# Patient Record
Sex: FEMALE | Race: WHITE | Employment: UNEMPLOYED | URBAN - METROPOLITAN AREA
[De-identification: names, ages, dates, MRNs, and addresses within clinical notes are randomized per-mention and may not be internally consistent; named-entity substitution may affect disease eponyms.]

---

## 2017-01-01 NOTE — LETTER
VACCINE ADMINISTRATION RECORD  PARENT / GUARDIAN APPROVAL  Date: 2022  Vaccine administered to: Hilary Foster     : 3/10/1995    MRN: [id number]    A copy of the appropriate Centers for Disease Control and Prevention Vaccine Information statement has been provided. I have read or have had explained the information about the diseases and the vaccines listed below. There was an opportunity to ask questions and any questions were answered satisfactorily. I believe that I understand the benefits and risks of the vaccine cited and ask that the vaccine(s) listed below be given to me or to the person named above (for whom I am authorized to make this request). VACCINES ADMINISTERED:  Tdap    I have read and hereby agree to be bound by the terms of this agreement as stated above. My signature is valid until revoked by me in writing. This document is signed by Seble Sprague relationship: Self on 2022.                                                                                                                                          Parent / Gerhardt Gill Signature                                                Date DISPLAY PLAN FREE TEXT

## 2017-02-04 ENCOUNTER — TELEPHONE (OUTPATIENT)
Dept: INTERNAL MEDICINE CLINIC | Facility: CLINIC | Age: 22
End: 2017-02-04

## 2017-02-04 RX ORDER — LEVOTHYROXINE SODIUM 0.03 MG/1
TABLET ORAL
Qty: 90 TABLET | Refills: 0 | Status: SHIPPED | OUTPATIENT
Start: 2017-02-04 | End: 2017-02-04

## 2017-02-04 RX ORDER — LEVOTHYROXINE SODIUM 0.03 MG/1
TABLET ORAL
Qty: 30 TABLET | Refills: 0 | Status: SHIPPED | OUTPATIENT
Start: 2017-02-04 | End: 2017-03-07

## 2017-02-04 NOTE — TELEPHONE ENCOUNTER
Patient requesting RX to be sent to new pharmacy [facility] mail order pharmacy [facility]  Patient states she is running out of medication and believes she does not have enough left before it gets to her by mail   Please submit as soon as possible   Thank

## 2017-02-04 NOTE — TELEPHONE ENCOUNTER
30 day sent to the local pharmacy as requested. Levothyroxine original order was on 2/4/17; Patient called and informed with understanding.

## 2017-02-04 NOTE — TELEPHONE ENCOUNTER
Patient requesting a call back from nurse regarding her medication   Patient will not have enough medication left before she gets it thru the mail order pharmacy   Patient wants to know if it will be okay if she does not take it for a couple of days or if

## 2017-02-04 NOTE — TELEPHONE ENCOUNTER
Refilled per protocol    Hypothyroid Medications  Protocol Criteria:  Appointment scheduled in the past 12 months or the next 3 months  TSH resulted in the past 12 months that is normal  Recent Visits       Provider Department Primary Dx    8 months ago Pa

## 2017-03-08 RX ORDER — LEVOTHYROXINE SODIUM 0.03 MG/1
TABLET ORAL
Qty: 30 TABLET | Refills: 0 | Status: SHIPPED | OUTPATIENT
Start: 2017-03-08 | End: 2017-04-03

## 2017-04-04 RX ORDER — LEVOTHYROXINE SODIUM 0.03 MG/1
TABLET ORAL
Qty: 30 TABLET | Refills: 0 | Status: SHIPPED | OUTPATIENT
Start: 2017-04-04 | End: 2017-05-07

## 2017-05-08 RX ORDER — LEVOTHYROXINE SODIUM 0.03 MG/1
TABLET ORAL
Qty: 30 TABLET | Refills: 0 | Status: SHIPPED | OUTPATIENT
Start: 2017-05-08 | End: 2017-05-09

## 2017-05-09 ENCOUNTER — OFFICE VISIT (OUTPATIENT)
Dept: INTERNAL MEDICINE CLINIC | Facility: CLINIC | Age: 22
End: 2017-05-09

## 2017-05-09 VITALS
RESPIRATION RATE: 16 BRPM | DIASTOLIC BLOOD PRESSURE: 73 MMHG | SYSTOLIC BLOOD PRESSURE: 115 MMHG | TEMPERATURE: 98 F | WEIGHT: 133 LBS | HEART RATE: 69 BPM | BODY MASS INDEX: 21 KG/M2

## 2017-05-09 DIAGNOSIS — E03.9 HYPOTHYROIDISM, UNSPECIFIED TYPE: Primary | ICD-10-CM

## 2017-05-09 DIAGNOSIS — Z30.011 ORAL CONTRACEPTION INITIAL PRESCRIPTION: ICD-10-CM

## 2017-05-09 DIAGNOSIS — M67.471 DIGITAL MUCOUS CYST OF TOE OF RIGHT FOOT: ICD-10-CM

## 2017-05-09 PROCEDURE — 99212 OFFICE O/P EST SF 10 MIN: CPT | Performed by: INTERNAL MEDICINE

## 2017-05-09 PROCEDURE — 99214 OFFICE O/P EST MOD 30 MIN: CPT | Performed by: INTERNAL MEDICINE

## 2017-05-09 RX ORDER — NORETHINDRONE ACETATE AND ETHINYL ESTRADIOL 1; .02 MG/1; MG/1
1 TABLET ORAL DAILY
Qty: 1 PACKAGE | Refills: 3 | Status: SHIPPED | OUTPATIENT
Start: 2017-05-09 | End: 2017-08-09

## 2017-05-09 RX ORDER — LEVOTHYROXINE SODIUM 0.03 MG/1
25 TABLET ORAL DAILY
Qty: 90 TABLET | Refills: 0 | Status: SHIPPED | OUTPATIENT
Start: 2017-05-09 | End: 2017-09-04

## 2017-05-09 NOTE — PROGRESS NOTES
Patient ID: Yessenia Joseph is a 25year old female.   Patient presents with:  CPX: presenting for cpx and to begin birth control       HPI  Here for physical and refills   Just finished school on sat 3 days ago  Will get  in June so wants ocps developed, well nourished,in no apparent distress  SKIN: no rashes,no suspicious lesions  HEENT: atraumatic, normocephalic,ears and throat are clear,   NECK: supple,no adenopathy,  LUNGS: clear to auscultation, no wheeze  CARDIO: RRR without murmur  GI: go

## 2017-05-11 ENCOUNTER — LAB ENCOUNTER (OUTPATIENT)
Dept: LAB | Age: 22
End: 2017-05-11
Attending: INTERNAL MEDICINE
Payer: COMMERCIAL

## 2017-05-11 DIAGNOSIS — E03.9 HYPOTHYROIDISM, UNSPECIFIED TYPE: ICD-10-CM

## 2017-05-11 PROCEDURE — 84443 ASSAY THYROID STIM HORMONE: CPT

## 2017-05-11 PROCEDURE — 80053 COMPREHEN METABOLIC PANEL: CPT

## 2017-05-11 PROCEDURE — 83036 HEMOGLOBIN GLYCOSYLATED A1C: CPT

## 2017-05-11 PROCEDURE — 85025 COMPLETE CBC W/AUTO DIFF WBC: CPT

## 2017-05-11 PROCEDURE — 80061 LIPID PANEL: CPT

## 2017-05-11 PROCEDURE — 36415 COLL VENOUS BLD VENIPUNCTURE: CPT

## 2017-05-15 ENCOUNTER — TELEPHONE (OUTPATIENT)
Dept: INTERNAL MEDICINE CLINIC | Facility: CLINIC | Age: 22
End: 2017-05-15

## 2017-05-16 NOTE — TELEPHONE ENCOUNTER
MMP see message below. JUNITO Quintana advised to book appt for pt with soonest provider available to discuss results and possible further work up. Routed to MMP as FYI.

## 2017-05-16 NOTE — TELEPHONE ENCOUNTER
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

## 2017-05-17 ENCOUNTER — OFFICE VISIT (OUTPATIENT)
Dept: INTERNAL MEDICINE CLINIC | Facility: CLINIC | Age: 22
End: 2017-05-17

## 2017-05-17 ENCOUNTER — HOSPITAL ENCOUNTER (OUTPATIENT)
Dept: GENERAL RADIOLOGY | Facility: HOSPITAL | Age: 22
Discharge: HOME | End: 2017-05-17
Attending: INTERNAL MEDICINE
Payer: COMMERCIAL

## 2017-05-17 VITALS
SYSTOLIC BLOOD PRESSURE: 112 MMHG | TEMPERATURE: 98 F | RESPIRATION RATE: 16 BRPM | BODY MASS INDEX: 21 KG/M2 | HEART RATE: 71 BPM | WEIGHT: 131 LBS | DIASTOLIC BLOOD PRESSURE: 74 MMHG

## 2017-05-17 DIAGNOSIS — R76.11 POSITIVE PPD: Primary | ICD-10-CM

## 2017-05-17 DIAGNOSIS — R76.11 POSITIVE PPD: ICD-10-CM

## 2017-05-17 PROCEDURE — 71010 XR CHEST AP/PA (1 VIEW) (CPT=71010): CPT | Performed by: INTERNAL MEDICINE

## 2017-05-17 PROCEDURE — 99213 OFFICE O/P EST LOW 20 MIN: CPT | Performed by: INTERNAL MEDICINE

## 2017-05-17 PROCEDURE — 99212 OFFICE O/P EST SF 10 MIN: CPT | Performed by: INTERNAL MEDICINE

## 2017-05-17 NOTE — PROGRESS NOTES
Patient ID: Seema Mtz is a 25year old female.   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

## 2017-05-18 ENCOUNTER — TELEPHONE (OUTPATIENT)
Dept: FAMILY MEDICINE CLINIC | Facility: CLINIC | Age: 22
End: 2017-05-18

## 2017-05-18 NOTE — TELEPHONE ENCOUNTER
Patient left forms at [facility] office for doctor to complete. Please call patient when complete.  [phone]

## 2017-05-22 DIAGNOSIS — R76.11 POSITIVE PPD: Primary | ICD-10-CM

## 2017-05-23 NOTE — TELEPHONE ENCOUNTER
Noted pt states she had 2 tuberculin skin test done at the [facility] first 1 was normal but she had lost the paper and she had to go back for a second one and that is when the nurse that the Opal that said that it was 15 mm positive had spoken to

## 2017-05-23 NOTE — TELEPHONE ENCOUNTER
Will scan all forms -- will give copy to pt as well including the cxr -- Hima) is helping with all of this

## 2017-05-24 ENCOUNTER — APPOINTMENT (OUTPATIENT)
Dept: LAB | Age: 22
End: 2017-05-24
Attending: INTERNAL MEDICINE
Payer: COMMERCIAL

## 2017-05-24 DIAGNOSIS — R76.11 POSITIVE PPD: ICD-10-CM

## 2017-05-24 PROCEDURE — 86480 TB TEST CELL IMMUN MEASURE: CPT

## 2017-05-24 PROCEDURE — 36415 COLL VENOUS BLD VENIPUNCTURE: CPT

## 2017-05-30 ENCOUNTER — TELEPHONE (OUTPATIENT)
Dept: INTERNAL MEDICINE CLINIC | Facility: CLINIC | Age: 22
End: 2017-05-30

## 2017-05-30 DIAGNOSIS — Z23 NEED FOR VACCINATION: Primary | ICD-10-CM

## 2017-05-30 NOTE — TELEPHONE ENCOUNTER
Pt stts she missed a call from the office on Friday No VM left  Pt stts she is waiting for forms, she is waiting for orders and for a tdap injection   Please advise

## 2017-05-30 NOTE — TELEPHONE ENCOUNTER
Forms completed along with X-rays and Quantiferon results placed at the .  Dr. Mehran Headley patient is requesting TDAP vaccine, I have pended the injection please review request.

## 2017-06-02 ENCOUNTER — NURSE ONLY (OUTPATIENT)
Dept: INTERNAL MEDICINE CLINIC | Facility: CLINIC | Age: 22
End: 2017-06-02

## 2017-06-02 DIAGNOSIS — Z23 NEED FOR TDAP VACCINATION: Primary | ICD-10-CM

## 2017-06-02 PROCEDURE — 90471 IMMUNIZATION ADMIN: CPT | Performed by: INTERNAL MEDICINE

## 2017-06-02 PROCEDURE — 90715 TDAP VACCINE 7 YRS/> IM: CPT | Performed by: INTERNAL MEDICINE

## 2017-08-09 DIAGNOSIS — Z30.011 ORAL CONTRACEPTION INITIAL PRESCRIPTION: ICD-10-CM

## 2017-08-14 RX ORDER — ESTAZOLAM 2 MG/1
1 TABLET ORAL DAILY
Qty: 21 TABLET | Refills: 0 | Status: SHIPPED | OUTPATIENT
Start: 2017-08-14 | End: 2017-09-16

## 2017-09-04 ENCOUNTER — TELEPHONE (OUTPATIENT)
Dept: INTERNAL MEDICINE CLINIC | Facility: CLINIC | Age: 22
End: 2017-09-04

## 2017-09-04 DIAGNOSIS — E03.9 HYPOTHYROIDISM, UNSPECIFIED TYPE: ICD-10-CM

## 2017-09-06 RX ORDER — LEVOTHYROXINE SODIUM 0.03 MG/1
TABLET ORAL
Qty: 90 TABLET | Refills: 1 | Status: SHIPPED | OUTPATIENT
Start: 2017-09-06

## 2017-09-06 NOTE — TELEPHONE ENCOUNTER
Chart reviewed. Refills sent per Triage Dept protocol.      Hypothyroid Medications  Protocol Criteria:  Appointment scheduled in the past 12 months or the next 3 months  TSH resulted in the past 12 months that is normal  Recent Outpatient Visits

## 2017-09-08 NOTE — TELEPHONE ENCOUNTER
I advised pt to check with a yvette in the town that she will be in ([place]where in [place]). Ask either for an emergency supply of a few days or a one month supply.   I advised that it is possible that her rx will not be covered by insurance due to just Amari Das

## 2017-09-08 NOTE — TELEPHONE ENCOUNTER
Pt is calling state that she is in [place] and she had to evaluate pt state that her prescription was sent to pharm in [facility] and her mom   Pt want to know if a new prescription can be sent to  [facility] in [address] [place] [address] Rd [phone]   Pt is requestin

## 2017-09-16 DIAGNOSIS — Z30.011 ORAL CONTRACEPTION INITIAL PRESCRIPTION: ICD-10-CM

## 2017-09-19 RX ORDER — ESTAZOLAM 2 MG/1
1 TABLET ORAL DAILY
Qty: 21 TABLET | Refills: 0 | Status: SHIPPED | OUTPATIENT
Start: 2017-09-19 | End: 2017-10-10

## 2017-10-10 DIAGNOSIS — Z30.011 ORAL CONTRACEPTION INITIAL PRESCRIPTION: ICD-10-CM

## 2017-10-11 RX ORDER — ESTAZOLAM 2 MG/1
1 TABLET ORAL DAILY
Qty: 21 TABLET | Refills: 0 | Status: SHIPPED | OUTPATIENT
Start: 2017-10-11 | End: 2017-11-04

## 2017-10-11 NOTE — TELEPHONE ENCOUNTER
Refill Protocol Appointment Criteria  · Appointment scheduled in the past 6 months or in the next 3 months  Recent Outpatient Visits            4 months ago Need for Tdap vaccination    [address], [address]    Nurse Only    4 months a

## 2017-11-04 ENCOUNTER — TELEPHONE (OUTPATIENT)
Dept: INTERNAL MEDICINE CLINIC | Facility: CLINIC | Age: 22
End: 2017-11-04

## 2017-11-04 DIAGNOSIS — Z30.011 ORAL CONTRACEPTION INITIAL PRESCRIPTION: ICD-10-CM

## 2017-11-04 RX ORDER — ESTAZOLAM 2 MG/1
1 TABLET ORAL DAILY
Qty: 21 TABLET | Refills: 3 | Status: SHIPPED | OUTPATIENT
Start: 2017-11-04 | End: 2021-06-09

## 2017-11-04 NOTE — TELEPHONE ENCOUNTER
Please clarify, you want to see pt in 6 months, or have her see you in November as she last saw you in May, and do you want her to schedule a pap at that time? Thank you.

## 2017-11-06 NOTE — TELEPHONE ENCOUNTER
MIHAELA: Advised patient of 's note below and advised she needs to schedule an appt and PAP in November. Patient verbalized understanding. Patient stating she moved to [place] and won't be able to see a doctor in [place], but she will call to schedule PAP with a doctor in [place] and is planning to find a PCP in [place] as well.

## 2021-04-26 ENCOUNTER — OFFICE VISIT (OUTPATIENT)
Dept: INTERNAL MEDICINE CLINIC | Facility: CLINIC | Age: 26
End: 2021-04-26
Payer: COMMERCIAL

## 2021-04-26 VITALS
WEIGHT: 140.19 LBS | BODY MASS INDEX: 22 KG/M2 | DIASTOLIC BLOOD PRESSURE: 60 MMHG | OXYGEN SATURATION: 96 % | HEART RATE: 82 BPM | HEIGHT: 67 IN | SYSTOLIC BLOOD PRESSURE: 100 MMHG

## 2021-04-26 DIAGNOSIS — F41.1 GAD (GENERALIZED ANXIETY DISORDER): ICD-10-CM

## 2021-04-26 DIAGNOSIS — R07.89 ATYPICAL CHEST PAIN: ICD-10-CM

## 2021-04-26 DIAGNOSIS — E03.9 HYPOTHYROIDISM (ACQUIRED): ICD-10-CM

## 2021-04-26 DIAGNOSIS — Z00.00 ANNUAL PHYSICAL EXAM: Primary | ICD-10-CM

## 2021-04-26 PROCEDURE — 3008F BODY MASS INDEX DOCD: CPT | Performed by: INTERNAL MEDICINE

## 2021-04-26 PROCEDURE — 3074F SYST BP LT 130 MM HG: CPT | Performed by: INTERNAL MEDICINE

## 2021-04-26 PROCEDURE — 99385 PREV VISIT NEW AGE 18-39: CPT | Performed by: INTERNAL MEDICINE

## 2021-04-26 PROCEDURE — 3078F DIAST BP <80 MM HG: CPT | Performed by: INTERNAL MEDICINE

## 2021-04-26 RX ORDER — MONTELUKAST SODIUM 10 MG/1
10 TABLET ORAL DAILY
Qty: 90 TABLET | Refills: 1 | Status: SHIPPED | OUTPATIENT
Start: 2021-04-26 | End: 2021-11-02

## 2021-04-26 NOTE — PROGRESS NOTES
Joseph Gomez is a 32year old female.     Chief complaint: annual physical exam      HPI:     Joseph Gomez is a 32year old pleasant female who presents for annual physical exam     Moved to [facility] in October   Had a baby 10 months ago in June TONSILLECTOMY               Family History   Problem Relation Age of Onset   • Cancer Mother         ovarian    • Cancer Other      Patient Active Problem List:     Hypothyroidism (acquired)     Hypothyroidism     Digital mucous cyst of toe of right foot Take 1 tablet (10 mg total) by mouth daily. Dispense: 90 tablet; Refill: 1  - OBG - INTERNAL  - XR RIBS WITH CHEST (3 VIEWS), LEFT  (CPT=71101); Future  - EKG 12-LEAD; Future  -  NAVIGATOR  - COMP METABOLIC PANEL (14); Future    3.  VANESSA (generalized anxi

## 2021-05-01 ENCOUNTER — LAB ENCOUNTER (OUTPATIENT)
Dept: LAB | Facility: HOSPITAL | Age: 26
End: 2021-05-01
Attending: INTERNAL MEDICINE
Payer: COMMERCIAL

## 2021-05-01 ENCOUNTER — HOSPITAL ENCOUNTER (OUTPATIENT)
Dept: GENERAL RADIOLOGY | Facility: HOSPITAL | Age: 26
Discharge: HOME | End: 2021-05-01
Attending: INTERNAL MEDICINE
Payer: COMMERCIAL

## 2021-05-01 ENCOUNTER — LAB ENCOUNTER (OUTPATIENT)
Dept: LAB | Facility: REFERENCE LAB | Age: 26
End: 2021-05-01
Attending: INTERNAL MEDICINE
Payer: COMMERCIAL

## 2021-05-01 DIAGNOSIS — R07.89 ATYPICAL CHEST PAIN: ICD-10-CM

## 2021-05-01 DIAGNOSIS — F41.1 GAD (GENERALIZED ANXIETY DISORDER): ICD-10-CM

## 2021-05-01 DIAGNOSIS — E03.9 HYPOTHYROIDISM (ACQUIRED): ICD-10-CM

## 2021-05-01 DIAGNOSIS — Z00.00 ANNUAL PHYSICAL EXAM: ICD-10-CM

## 2021-05-01 PROCEDURE — 80053 COMPREHEN METABOLIC PANEL: CPT

## 2021-05-01 PROCEDURE — 93010 ELECTROCARDIOGRAM REPORT: CPT | Performed by: INTERNAL MEDICINE

## 2021-05-01 PROCEDURE — 71101 X-RAY EXAM UNILAT RIBS/CHEST: CPT | Performed by: INTERNAL MEDICINE

## 2021-05-01 PROCEDURE — 84439 ASSAY OF FREE THYROXINE: CPT

## 2021-05-01 PROCEDURE — 84443 ASSAY THYROID STIM HORMONE: CPT

## 2021-05-01 PROCEDURE — 36415 COLL VENOUS BLD VENIPUNCTURE: CPT

## 2021-05-01 PROCEDURE — 82306 VITAMIN D 25 HYDROXY: CPT

## 2021-05-01 PROCEDURE — 80061 LIPID PANEL: CPT

## 2021-05-01 PROCEDURE — 93005 ELECTROCARDIOGRAM TRACING: CPT

## 2021-05-01 PROCEDURE — 85025 COMPLETE CBC W/AUTO DIFF WBC: CPT

## 2021-05-04 DIAGNOSIS — R07.9 CHEST PAIN, UNSPECIFIED TYPE: ICD-10-CM

## 2021-05-04 DIAGNOSIS — R94.31 ABNORMAL EKG: Primary | ICD-10-CM

## 2021-05-05 RX ORDER — LEVOTHYROXINE SODIUM 0.05 MG/1
50 TABLET ORAL
Qty: 90 TABLET | Refills: 1 | Status: SHIPPED | OUTPATIENT
Start: 2021-05-05 | End: 2021-11-02

## 2021-05-05 RX ORDER — ERGOCALCIFEROL 1.25 MG/1
50000 CAPSULE ORAL WEEKLY
Qty: 12 CAPSULE | Refills: 1 | Status: SHIPPED | OUTPATIENT
Start: 2021-05-05 | End: 2021-07-22

## 2021-05-20 ENCOUNTER — HOSPITAL ENCOUNTER (OUTPATIENT)
Dept: CV DIAGNOSTICS | Age: 26
Discharge: HOME | End: 2021-05-20
Attending: INTERNAL MEDICINE
Payer: COMMERCIAL

## 2021-05-20 DIAGNOSIS — R94.31 ABNORMAL EKG: ICD-10-CM

## 2021-05-20 PROCEDURE — 93306 TTE W/DOPPLER COMPLETE: CPT | Performed by: INTERNAL MEDICINE

## 2021-06-01 ENCOUNTER — LAB ENCOUNTER (OUTPATIENT)
Dept: LAB | Age: 26
End: 2021-06-01
Attending: INTERNAL MEDICINE
Payer: COMMERCIAL

## 2021-06-01 DIAGNOSIS — R07.9 CHEST PAIN, UNSPECIFIED TYPE: ICD-10-CM

## 2021-06-01 DIAGNOSIS — R94.31 ABNORMAL EKG: ICD-10-CM

## 2021-06-03 ENCOUNTER — HOSPITAL ENCOUNTER (OUTPATIENT)
Dept: CV DIAGNOSTICS | Facility: HOSPITAL | Age: 26
Discharge: HOME | End: 2021-06-03
Attending: INTERNAL MEDICINE
Payer: COMMERCIAL

## 2021-06-03 DIAGNOSIS — R94.31 ABNORMAL EKG: ICD-10-CM

## 2021-06-03 PROCEDURE — 93017 CV STRESS TEST TRACING ONLY: CPT | Performed by: INTERNAL MEDICINE

## 2021-06-03 PROCEDURE — 93018 CV STRESS TEST I&R ONLY: CPT | Performed by: INTERNAL MEDICINE

## 2021-06-04 ENCOUNTER — OFFICE VISIT (OUTPATIENT)
Dept: OBGYN CLINIC | Facility: CLINIC | Age: 26
End: 2021-06-04
Payer: COMMERCIAL

## 2021-06-04 VITALS
HEIGHT: 67 IN | SYSTOLIC BLOOD PRESSURE: 92 MMHG | WEIGHT: 140.63 LBS | BODY MASS INDEX: 22.07 KG/M2 | DIASTOLIC BLOOD PRESSURE: 56 MMHG | HEART RATE: 71 BPM

## 2021-06-04 DIAGNOSIS — Z12.4 SCREENING FOR MALIGNANT NEOPLASM OF CERVIX: ICD-10-CM

## 2021-06-04 DIAGNOSIS — Z01.419 ENCOUNTER FOR GYNECOLOGICAL EXAMINATION WITHOUT ABNORMAL FINDING: Primary | ICD-10-CM

## 2021-06-04 PROCEDURE — 3074F SYST BP LT 130 MM HG: CPT | Performed by: OBSTETRICS & GYNECOLOGY

## 2021-06-04 PROCEDURE — 3008F BODY MASS INDEX DOCD: CPT | Performed by: OBSTETRICS & GYNECOLOGY

## 2021-06-04 PROCEDURE — 99395 PREV VISIT EST AGE 18-39: CPT | Performed by: OBSTETRICS & GYNECOLOGY

## 2021-06-04 PROCEDURE — 3078F DIAST BP <80 MM HG: CPT | Performed by: OBSTETRICS & GYNECOLOGY

## 2021-06-10 NOTE — PROGRESS NOTES
Yanni Hernandez is a 32year old female [id number] Patient's last menstrual period was 05/23/2021. Patient presents with:  Gyn Exam: NEW PATIENT, ANNUAL EXAM. When ocps in past, ? migraines vs miniTIA. Okay if conceives  .     OBSTETRICS HISTORY:  OB Histor PALPITATIONS,                            SHORTNESS OF BREATH  Lactose                 DIARRHEA      Review of Systems:  Constitutional:    denies fever / chills  Eyes:     denies blurred or double vision  Cardiovascular:  denies chest pain or palpitations today for gyn exam.    Diagnoses and all orders for this visit:    Encounter for gynecological examination without abnormal finding    Screening for malignant neoplasm of cervix  -     THINPREP PAP WITH HPV REFLEX REQUEST B; Future  -     THINPREP PAP WITH

## 2021-08-04 PROBLEM — R53.83 OTHER FATIGUE: Status: ACTIVE | Noted: 2021-08-04

## 2021-08-04 PROBLEM — E55.9 VITAMIN D DEFICIENCY: Status: ACTIVE | Noted: 2021-08-04

## 2021-08-04 PROCEDURE — 83540 ASSAY OF IRON: CPT | Performed by: INTERNAL MEDICINE

## 2021-08-04 PROCEDURE — 85025 COMPLETE CBC W/AUTO DIFF WBC: CPT | Performed by: INTERNAL MEDICINE

## 2021-08-04 PROCEDURE — 82607 VITAMIN B-12: CPT | Performed by: INTERNAL MEDICINE

## 2021-08-04 PROCEDURE — 84466 ASSAY OF TRANSFERRIN: CPT | Performed by: INTERNAL MEDICINE

## 2021-08-04 PROCEDURE — 82728 ASSAY OF FERRITIN: CPT | Performed by: INTERNAL MEDICINE

## 2021-08-04 PROCEDURE — 84443 ASSAY THYROID STIM HORMONE: CPT | Performed by: INTERNAL MEDICINE

## 2021-08-04 NOTE — PATIENT INSTRUCTIONS
Sertraline HCl Oral Tablet 50 mg   Uses  This medicine is used for the following purposes:  · anxiety  · depression  · eating disorders  · obsessive compulsive disorder  · post-traumatic stress disorder  Instructions  This medicine may be taken with or w pharmacist if this medicine can interact with any of your other medicines. Be sure to tell them about all the medicines you take.   Do not start or stop any other medicines without first speaking to your doctor or pharmacist.  Call your doctor right away if speak with your doctor, nurse, or pharmacist if you have any questions about this medicine. [email or web address]  IMPORTANT NOTE: This document tells you briefly how to take your medicine, but it does not tell you all there is to

## 2021-08-06 DIAGNOSIS — D72.9 ABNORMAL WBC COUNT: Primary | ICD-10-CM

## 2021-10-27 DIAGNOSIS — Z00.00 ANNUAL PHYSICAL EXAM: ICD-10-CM

## 2021-10-27 DIAGNOSIS — R07.89 ATYPICAL CHEST PAIN: ICD-10-CM

## 2021-10-27 DIAGNOSIS — F41.1 GAD (GENERALIZED ANXIETY DISORDER): ICD-10-CM

## 2021-10-27 DIAGNOSIS — E03.9 HYPOTHYROIDISM (ACQUIRED): ICD-10-CM

## 2021-11-02 DIAGNOSIS — Z00.00 ANNUAL PHYSICAL EXAM: ICD-10-CM

## 2021-11-02 DIAGNOSIS — R07.89 ATYPICAL CHEST PAIN: ICD-10-CM

## 2021-11-02 DIAGNOSIS — E55.9 VITAMIN D DEFICIENCY: ICD-10-CM

## 2021-11-02 DIAGNOSIS — E03.9 HYPOTHYROIDISM, UNSPECIFIED TYPE: ICD-10-CM

## 2021-11-02 DIAGNOSIS — N92.6 IRREGULAR PERIODS: ICD-10-CM

## 2021-11-02 DIAGNOSIS — F41.1 GAD (GENERALIZED ANXIETY DISORDER): ICD-10-CM

## 2021-11-02 DIAGNOSIS — R53.83 OTHER FATIGUE: ICD-10-CM

## 2021-11-02 DIAGNOSIS — E03.9 HYPOTHYROIDISM (ACQUIRED): ICD-10-CM

## 2021-11-02 RX ORDER — MONTELUKAST SODIUM 10 MG/1
10 TABLET ORAL DAILY
Qty: 90 TABLET | Refills: 1 | Status: SHIPPED | OUTPATIENT
Start: 2021-11-02 | End: 2022-02-02

## 2021-11-02 RX ORDER — MONTELUKAST SODIUM 10 MG/1
10 TABLET ORAL DAILY
Qty: 90 TABLET | Refills: 1 | Status: SHIPPED | OUTPATIENT
Start: 2021-11-02 | End: 2022-10-28

## 2021-11-02 RX ORDER — MONTELUKAST SODIUM 10 MG/1
TABLET ORAL
Qty: 90 TABLET | Refills: 1 | Status: SHIPPED | OUTPATIENT
Start: 2021-11-02 | End: 2022-02-02

## 2021-11-02 RX ORDER — LEVOTHYROXINE SODIUM 0.03 MG/1
TABLET ORAL
Qty: 90 TABLET | Refills: 1 | Status: CANCELLED | OUTPATIENT
Start: 2021-11-02

## 2021-11-02 RX ORDER — LEVOTHYROXINE SODIUM 0.05 MG/1
TABLET ORAL
Qty: 90 TABLET | Refills: 1 | Status: SHIPPED | OUTPATIENT
Start: 2021-11-02

## 2021-12-06 ENCOUNTER — TELEMEDICINE (OUTPATIENT)
Dept: INTERNAL MEDICINE CLINIC | Facility: CLINIC | Age: 26
End: 2021-12-06

## 2021-12-06 DIAGNOSIS — J06.9 URTI (ACUTE UPPER RESPIRATORY INFECTION): ICD-10-CM

## 2021-12-06 DIAGNOSIS — R05.9 COUGH: Primary | ICD-10-CM

## 2021-12-06 DIAGNOSIS — R09.81 SINUS CONGESTION: ICD-10-CM

## 2021-12-06 PROCEDURE — 99213 OFFICE O/P EST LOW 20 MIN: CPT | Performed by: INTERNAL MEDICINE

## 2021-12-06 RX ORDER — AMOXICILLIN AND CLAVULANATE POTASSIUM 875; 125 MG/1; MG/1
1 TABLET, FILM COATED ORAL 2 TIMES DAILY
Qty: 20 TABLET | Refills: 0 | Status: SHIPPED | OUTPATIENT
Start: 2021-12-06 | End: 2021-12-16

## 2021-12-06 RX ORDER — METHYLPREDNISOLONE 4 MG/1
TABLET ORAL
Qty: 1 EACH | Refills: 0 | Status: SHIPPED | OUTPATIENT
Start: 2021-12-06

## 2021-12-06 NOTE — PROGRESS NOTES
This visit was conducted using telemedicine with live interactive video and audio   Patient verbally consents to conduct video visit   Patient understands and accepts financial responsibility for any deductible, co-insurance and/or co-pays associated with LEVOTHYROXINE SODIUM 25 MCG Oral Tab TAKE 1 TABLET BY MOUTH ONCE DAILY.  OFFICE VISIT REQUIRED FOR ADDL REFILLS 90 tablet 1     Patient Active Problem List:     Hypothyroidism (acquired)     Hypothyroidism     Digital mucous cyst of toe of right foot     GA · Augmentin x 10 days   · If no improvement in 48 hours to start medrol dose maury   · If worsening or new symptoms to RTC   ·     Meds & Refills for this Visit:  Requested Prescriptions     Signed Prescriptions Disp Refills   • amoxicillin clavulanate 679

## 2022-01-11 ENCOUNTER — TELEPHONE (OUTPATIENT)
Dept: OBGYN CLINIC | Facility: CLINIC | Age: 27
End: 2022-01-11

## 2022-01-11 NOTE — TELEPHONE ENCOUNTER
Pt confirms lmp 12/15 and +hpt. Pt agrees to PN appt rotation with all providers. OBN PC scheduled on 2/2. Advised pt to start taking PNV with iron, folic acid and dha.

## 2022-01-30 DIAGNOSIS — N92.6 IRREGULAR PERIODS: ICD-10-CM

## 2022-01-30 DIAGNOSIS — E03.9 HYPOTHYROIDISM (ACQUIRED): ICD-10-CM

## 2022-01-30 DIAGNOSIS — F41.1 GAD (GENERALIZED ANXIETY DISORDER): ICD-10-CM

## 2022-01-30 DIAGNOSIS — R53.83 OTHER FATIGUE: ICD-10-CM

## 2022-01-30 DIAGNOSIS — E55.9 VITAMIN D DEFICIENCY: ICD-10-CM

## 2022-01-31 NOTE — TELEPHONE ENCOUNTER
Requested Prescriptions     Pending Prescriptions Disp Refills   • SERTRALINE 50 MG Oral Tab [Pharmacy Med Name: SERTRALINE 50MG TABLETS] 90 tablet 0     Sig: TAKE 1 TABLET(50 MG) BY MOUTH DAILY     Last office visit: 12-6-21 virtual   Medication last refi

## 2022-02-02 ENCOUNTER — NURSE ONLY (OUTPATIENT)
Dept: OBGYN CLINIC | Facility: CLINIC | Age: 27
End: 2022-02-02
Payer: COMMERCIAL

## 2022-02-02 VITALS — BODY MASS INDEX: 23 KG/M2 | WEIGHT: 145 LBS

## 2022-02-02 DIAGNOSIS — Z34.81 ENCOUNTER FOR SUPERVISION OF OTHER NORMAL PREGNANCY IN FIRST TRIMESTER: Primary | ICD-10-CM

## 2022-02-02 RX ORDER — CHOLECALCIFEROL (VITAMIN D3) 25 MCG
1 TABLET,CHEWABLE ORAL DAILY
COMMUNITY

## 2022-02-02 NOTE — PROGRESS NOTES
Pt had OBN appt today with no complaints. Normal PN labs ordered. Pt advised all labs must be completed and resulted prior to MD appt. Pt accepted NPN appt with GIOVANNA on 2/24. Partner's name is Morro Roper # [phone]; race:    Occupation: stay at home mom     MEDICAL HISTORY    Anemia No    Anesthetic complications No    Anxiety/Depression  Yes-anxiety. On zoloft. Autoimmune Disorder No    Asthma  No    Cancer No    Diabetes  No    Gyne/breast Surgery No    Heart Disease No    Hepatitis/Liver Disease  No    History of blood transfusion No    History of abnormal pap No    Hypertension  No    Infertility  No    Kidney Disease/Frequent UTIs  No    Medication Allergies No    Latex Allergies No    Food Allergies  Yes lactose and shellfish    Neurological Disorder/Epilepsy No    Operations/Hospitalizations Yes-tonsillectomy at age 3.     TB exposure  No    Thyroid Dysfunction Hypothyroidism. Diagnosed in high school. Trauma/Violence  No    Uterine Anomaly  No    Uterine Fibroids  No    Variocosities/DVTs No    Smoker No    Drug usage in prior year No    Alcohol Socially prior to pregnancy     Would you accept a blood transfusion? If no, are you a Restorationist?  Pt stated she would accept blood products in event of emergency                 INFECTION HISTORY    Chlamydia No    Pt or partner have hx of Genital Herpes No    Gonorrhea No    Hepatitis B No    HIV No    HPV No    MRSA No    Syphilis No    Tattoos No    Live with someone or Exposed to TB No    Rash or viral illness since LMP  No    Varicella Yes    Recent Travel (or planned travel) to [facility] for self and or partner No    Pets Yes-dog         GENETICS SCREENING    Genetic Screening    Genetic Screening/Teratology Counseling- Includes patient, baby's father, or anyone in either family with:  Patient's age 28 years or older as of estimated date of delivery: No   Thalassemia ([place], [place], [address], or  background): MCV less than 80: No   Neural tube defect (Meningomyelocele, Spina bifida, or Anencephaly): No   Congenital heart defect: No   Down syndrome: No   Morgan-Sachs (Ashkenazi [place]ism, [place], [place]): No   Canavan disease (Ashkenazi Hinduism): No   Familial dysautonomia (Ashkenazi Hinduism): No   Sickle cell disease or trait (): No   Hemophilia or other blood disorders: No   Muscular dystrophy: No    Cystic fibrosis: Yes (Comment: maternal cousin with cystic fibrosis )   Peggy's chorea: No   Intellectual disability and/or autism: Yes (Comment: pts sister with autism )   If yes, was the person tested for Fragile X?: No (Comment: unsure )   Other inherited genetic or chromosomal disorder: Yes (Comment: husbands paternal uncle with mental disability )   Maternal metabolic disorder (eg. Type 1 diabetes, PKU): No   Patient or baby's father had child with birth defects not listed above: No   Recurrent pregnancy loss, or a stillbirth: Yes (Comment: pts mother in law with miscarriages )   Medications (including supplements, vitamins, herbs, or OTC drugs)/illicit/recreational drugs/alcohol since last menstrual period: No               MISC    Infant vaccinations  Yes     Pt. Has answered NO 5P questions and has NO  risk factors. Pt. Given What pregnant women need to know handout.

## 2022-02-12 ENCOUNTER — LAB ENCOUNTER (OUTPATIENT)
Dept: LAB | Age: 27
End: 2022-02-12
Attending: OBSTETRICS & GYNECOLOGY
Payer: COMMERCIAL

## 2022-02-12 DIAGNOSIS — Z34.81 ENCOUNTER FOR SUPERVISION OF OTHER NORMAL PREGNANCY IN FIRST TRIMESTER: ICD-10-CM

## 2022-02-12 DIAGNOSIS — D72.9 ABNORMAL WBC COUNT: ICD-10-CM

## 2022-02-12 LAB
ANTIBODY SCREEN: NEGATIVE
BASOPHILS # BLD AUTO: 0.03 X10(3) UL (ref 0–0.2)
BASOPHILS NFR BLD AUTO: 0.4 %
DEPRECATED RDW RBC AUTO: 42.5 FL (ref 35.1–46.3)
EOSINOPHIL # BLD AUTO: 0.08 X10(3) UL (ref 0–0.7)
EOSINOPHIL NFR BLD AUTO: 0.9 %
ERYTHROCYTE [DISTWIDTH] IN BLOOD BY AUTOMATED COUNT: 13.7 % (ref 11–15)
HBV SURFACE AG SER-ACNC: <0.1 [IU]/L
HBV SURFACE AG SERPL QL IA: NONREACTIVE
HCG SERPL QL: POSITIVE
HCT VFR BLD AUTO: 41.8 %
HGB BLD-MCNC: 13.6 G/DL
IMM GRANULOCYTES # BLD AUTO: 0.02 X10(3) UL (ref 0–1)
IMM GRANULOCYTES NFR BLD: 0.2 %
LYMPHOCYTES # BLD AUTO: 1.74 X10(3) UL (ref 1–4)
LYMPHOCYTES NFR BLD AUTO: 20.3 %
MCH RBC QN AUTO: 27.9 PG (ref 26–34)
MCHC RBC AUTO-ENTMCNC: 32.5 G/DL (ref 31–37)
MCV RBC AUTO: 85.8 FL
MONOCYTES # BLD AUTO: 0.43 X10(3) UL (ref 0.1–1)
MONOCYTES NFR BLD AUTO: 5 %
NEUTROPHILS # BLD AUTO: 6.27 X10 (3) UL (ref 1.5–7.7)
NEUTROPHILS # BLD AUTO: 6.27 X10(3) UL (ref 1.5–7.7)
NEUTROPHILS NFR BLD AUTO: 73.2 %
PLATELET # BLD AUTO: 86 10(3)UL (ref 150–450)
RBC # BLD AUTO: 4.87 X10(6)UL
RH BLOOD TYPE: POSITIVE
RUBV IGG SER-ACNC: 37 IU/ML (ref 10–?)
WBC # BLD AUTO: 8.6 X10(3) UL (ref 4–11)

## 2022-02-12 PROCEDURE — 87086 URINE CULTURE/COLONY COUNT: CPT

## 2022-02-12 PROCEDURE — 84703 CHORIONIC GONADOTROPIN ASSAY: CPT

## 2022-02-12 PROCEDURE — 86850 RBC ANTIBODY SCREEN: CPT

## 2022-02-12 PROCEDURE — 36415 COLL VENOUS BLD VENIPUNCTURE: CPT

## 2022-02-12 PROCEDURE — 87340 HEPATITIS B SURFACE AG IA: CPT

## 2022-02-12 PROCEDURE — 86780 TREPONEMA PALLIDUM: CPT

## 2022-02-12 PROCEDURE — 86900 BLOOD TYPING SEROLOGIC ABO: CPT

## 2022-02-12 PROCEDURE — 86762 RUBELLA ANTIBODY: CPT

## 2022-02-12 PROCEDURE — 85025 COMPLETE CBC W/AUTO DIFF WBC: CPT

## 2022-02-12 PROCEDURE — 87389 HIV-1 AG W/HIV-1&-2 AB AG IA: CPT

## 2022-02-12 PROCEDURE — 86901 BLOOD TYPING SEROLOGIC RH(D): CPT

## 2022-02-14 LAB — T PALLIDUM AB SER QL: NEGATIVE

## 2022-02-24 ENCOUNTER — LAB ENCOUNTER (OUTPATIENT)
Dept: LAB | Facility: HOSPITAL | Age: 27
End: 2022-02-24
Attending: INTERNAL MEDICINE
Payer: COMMERCIAL

## 2022-02-24 ENCOUNTER — INITIAL PRENATAL (OUTPATIENT)
Dept: OBGYN CLINIC | Facility: CLINIC | Age: 27
End: 2022-02-24
Payer: COMMERCIAL

## 2022-02-24 VITALS
BODY MASS INDEX: 23 KG/M2 | HEART RATE: 76 BPM | SYSTOLIC BLOOD PRESSURE: 119 MMHG | DIASTOLIC BLOOD PRESSURE: 77 MMHG | WEIGHT: 146.19 LBS

## 2022-02-24 DIAGNOSIS — R79.89 ABNORMAL CBC: ICD-10-CM

## 2022-02-24 DIAGNOSIS — Z34.81 ENCOUNTER FOR SUPERVISION OF OTHER NORMAL PREGNANCY IN FIRST TRIMESTER: ICD-10-CM

## 2022-02-24 DIAGNOSIS — Z11.3 SCREEN FOR STD (SEXUALLY TRANSMITTED DISEASE): ICD-10-CM

## 2022-02-24 DIAGNOSIS — Z34.81 ENCOUNTER FOR SUPERVISION OF OTHER NORMAL PREGNANCY IN FIRST TRIMESTER: Primary | ICD-10-CM

## 2022-02-24 PROBLEM — O99.119 THROMBOCYTOPENIA AFFECTING PREGNANCY (HCC): Status: ACTIVE | Noted: 2022-02-24

## 2022-02-24 PROBLEM — D69.6 THROMBOCYTOPENIA AFFECTING PREGNANCY (HCC): Status: ACTIVE | Noted: 2022-02-24

## 2022-02-24 PROBLEM — Z00.00 ANNUAL PHYSICAL EXAM: Status: RESOLVED | Noted: 2021-04-26 | Resolved: 2022-02-24

## 2022-02-24 LAB
APPEARANCE: CLEAR
BASOPHILS # BLD AUTO: 0.03 X10(3) UL (ref 0–0.2)
BASOPHILS NFR BLD AUTO: 0.4 %
BILIRUBIN: NEGATIVE
DEPRECATED RDW RBC AUTO: 42.8 FL (ref 35.1–46.3)
EOSINOPHIL # BLD AUTO: 0.06 X10(3) UL (ref 0–0.7)
EOSINOPHIL NFR BLD AUTO: 0.7 %
ERYTHROCYTE [DISTWIDTH] IN BLOOD BY AUTOMATED COUNT: 13.9 % (ref 11–15)
GLUCOSE (URINE DIPSTICK): NEGATIVE MG/DL
HCT VFR BLD AUTO: 37.2 %
HGB BLD-MCNC: 12.1 G/DL
IMM GRANULOCYTES # BLD AUTO: 0.02 X10(3) UL (ref 0–1)
IMM GRANULOCYTES NFR BLD: 0.2 %
LEUKOCYTES: NEGATIVE
LYMPHOCYTES # BLD AUTO: 1.66 X10(3) UL (ref 1–4)
LYMPHOCYTES NFR BLD AUTO: 20.5 %
MCH RBC QN AUTO: 27.4 PG (ref 26–34)
MCHC RBC AUTO-ENTMCNC: 32.5 G/DL (ref 31–37)
MCV RBC AUTO: 84.2 FL
MONOCYTES # BLD AUTO: 0.45 X10(3) UL (ref 0.1–1)
MONOCYTES NFR BLD AUTO: 5.6 %
MULTISTIX LOT#: NORMAL NUMERIC
NEUTROPHILS # BLD AUTO: 5.86 X10 (3) UL (ref 1.5–7.7)
NEUTROPHILS # BLD AUTO: 5.86 X10(3) UL (ref 1.5–7.7)
NEUTROPHILS NFR BLD AUTO: 72.6 %
NITRITE, URINE: NEGATIVE
PH, URINE: 6 (ref 4.5–8)
PLATELET # BLD AUTO: 277 10(3)UL (ref 150–450)
RBC # BLD AUTO: 4.42 X10(6)UL
SPECIFIC GRAVITY: 1.03 (ref 1–1.03)
TSI SER-ACNC: 1.71 MIU/ML (ref 0.36–3.74)
URINE-COLOR: YELLOW
UROBILINOGEN,SEMI-QN: 0.2 MG/DL (ref 0–1.9)
WBC # BLD AUTO: 8.1 X10(3) UL (ref 4–11)

## 2022-02-24 PROCEDURE — 3074F SYST BP LT 130 MM HG: CPT | Performed by: OBSTETRICS & GYNECOLOGY

## 2022-02-24 PROCEDURE — 3078F DIAST BP <80 MM HG: CPT | Performed by: OBSTETRICS & GYNECOLOGY

## 2022-02-24 PROCEDURE — 76815 OB US LIMITED FETUS(S): CPT | Performed by: OBSTETRICS & GYNECOLOGY

## 2022-02-24 PROCEDURE — 81002 URINALYSIS NONAUTO W/O SCOPE: CPT | Performed by: OBSTETRICS & GYNECOLOGY

## 2022-02-24 PROCEDURE — 85025 COMPLETE CBC W/AUTO DIFF WBC: CPT

## 2022-02-24 PROCEDURE — 36415 COLL VENOUS BLD VENIPUNCTURE: CPT

## 2022-02-24 PROCEDURE — 84443 ASSAY THYROID STIM HORMONE: CPT

## 2022-02-24 NOTE — PROGRESS NOTES
Here with . Declined genetics. Last pap 6/2021. Gc/chlamydia/trichomonas. Order for TSH. PCP ordered repeat CBC. Bedside ultrasound--+FHT, c/w dates.   RTC 4 wk

## 2022-02-25 LAB
C TRACH DNA SPEC QL NAA+PROBE: NEGATIVE
N GONORRHOEA DNA SPEC QL NAA+PROBE: NEGATIVE
T VAGINALIS RRNA SPEC QL NAA+PROBE: NEGATIVE

## 2022-02-27 ENCOUNTER — TELEPHONE (OUTPATIENT)
Dept: OBGYN CLINIC | Facility: CLINIC | Age: 27
End: 2022-02-27

## 2022-02-27 RX ORDER — METOCLOPRAMIDE 10 MG/1
10 TABLET ORAL EVERY 6 HOURS PRN
Qty: 20 TABLET | Refills: 0 | Status: SHIPPED | OUTPATIENT
Start: 2022-02-27

## 2022-02-27 NOTE — TELEPHONE ENCOUNTER
On call--    10 weeks c/o n/v/d. Daughter has same stomach bug and pt now with symptoms. No cramping or VB. Encouraged hydration. Reglan sent to pharmacy. Immodium for diarrhea rec'd. BRAT diet discussed. Reviewed ER indications. Pt states they did at home covid test and daughter was negative. Given pregnancy, she should have covid testing given GI symptoms.      Please check in on patient tomorrow

## 2022-02-28 ENCOUNTER — NURSE ONLY (OUTPATIENT)
Dept: LAB | Age: 27
End: 2022-02-28
Attending: OBSTETRICS & GYNECOLOGY
Payer: COMMERCIAL

## 2022-02-28 DIAGNOSIS — Z20.822 ENCOUNTER FOR SCREENING LABORATORY TESTING FOR COVID-19 VIRUS: ICD-10-CM

## 2022-02-28 NOTE — TELEPHONE ENCOUNTER
Pt reports she took Reglan and Imodium yesterday and has not had any vomiting and diarrhea today. Provided pt with CS # to schedule covid test. Await result.

## 2022-03-01 LAB — SARS-COV-2 RNA RESP QL NAA+PROBE: NOT DETECTED

## 2022-03-14 ENCOUNTER — OFFICE VISIT (OUTPATIENT)
Dept: OBGYN CLINIC | Facility: CLINIC | Age: 27
End: 2022-03-14
Payer: COMMERCIAL

## 2022-03-14 DIAGNOSIS — Z71.89 PRENATAL CONSULT: Primary | ICD-10-CM

## 2022-03-14 NOTE — PROGRESS NOTES
Mahamed Gupta is here for meet and greet. Pt. denies any medical conditions, other than hypothyroidism. First pregnancy complicated by polyhydramnios that resolved at 38wks. Had vacuum delivery after pushing for 4 hours. Has seen 2nd floor OBs and also had one appt with [facility]. [facility] now in network for insurance. Desires CN care. Midwifery care & philosophy discussed. Practice guidelines discussed. Pt is appropriate for LUDMILA.

## 2022-04-05 ENCOUNTER — ROUTINE PRENATAL (OUTPATIENT)
Dept: OBGYN CLINIC | Facility: CLINIC | Age: 27
End: 2022-04-05
Payer: COMMERCIAL

## 2022-04-05 ENCOUNTER — TELEPHONE (OUTPATIENT)
Dept: OBGYN CLINIC | Facility: CLINIC | Age: 27
End: 2022-04-05

## 2022-04-05 VITALS
DIASTOLIC BLOOD PRESSURE: 70 MMHG | BODY MASS INDEX: 23 KG/M2 | SYSTOLIC BLOOD PRESSURE: 108 MMHG | HEART RATE: 83 BPM | WEIGHT: 146 LBS

## 2022-04-05 DIAGNOSIS — Z34.82 ENCOUNTER FOR SUPERVISION OF OTHER NORMAL PREGNANCY IN SECOND TRIMESTER: Primary | ICD-10-CM

## 2022-04-05 DIAGNOSIS — O99.119 THROMBOCYTOPENIA AFFECTING PREGNANCY (HCC): ICD-10-CM

## 2022-04-05 DIAGNOSIS — D69.6 THROMBOCYTOPENIA AFFECTING PREGNANCY (HCC): ICD-10-CM

## 2022-04-05 LAB
APPEARANCE: CLEAR
GLUCOSE (URINE DIPSTICK): NEGATIVE MG/DL
LEUKOCYTES: NEGATIVE
MULTISTIX LOT#: ABNORMAL NUMERIC
NITRITE, URINE: NEGATIVE
OCCULT BLOOD: NEGATIVE
PH, URINE: 5 (ref 4.5–8)
PROTEIN (URINE DIPSTICK): NEGATIVE MG/DL
SPECIFIC GRAVITY: 1.02 (ref 1–1.03)
URINE-COLOR: YELLOW
UROBILINOGEN,SEMI-QN: 0.2 MG/DL (ref 0–1.9)

## 2022-04-05 PROCEDURE — 81002 URINALYSIS NONAUTO W/O SCOPE: CPT | Performed by: ADVANCED PRACTICE MIDWIFE

## 2022-04-05 PROCEDURE — 3078F DIAST BP <80 MM HG: CPT | Performed by: ADVANCED PRACTICE MIDWIFE

## 2022-04-05 PROCEDURE — 3074F SYST BP LT 130 MM HG: CPT | Performed by: ADVANCED PRACTICE MIDWIFE

## 2022-04-05 NOTE — PROGRESS NOTES
Unsure if feels FM  Denies any vaginal bleeding or leakage of fluid  Some pelvic pressure - has started yoga  D+FHT and FM on bedside ultrasound  Order for Level 1 ultrasound given  Warning signs reviewed  All questions answered  Pts plt on 2/12 were 86K and repeated on 2/24 and were 212.   Repeat PLT in 1 month (before next visit)

## 2022-04-05 NOTE — TELEPHONE ENCOUNTER
I just had a visit with this patient and forgot to ask her to repeat her CBC before her next visit. Pts plt on 2/12 were 86K and repeated on 2/24 and were 212.   Repeat PLT in 1 month (before next visit)

## 2022-04-11 ENCOUNTER — TELEPHONE (OUTPATIENT)
Dept: OBGYN CLINIC | Facility: CLINIC | Age: 27
End: 2022-04-11

## 2022-04-11 NOTE — TELEPHONE ENCOUNTER
Patient requesting to get ultrasound done else where since she is unable to get in with [facility]. Patient advised since she has a level one ordered, she can get the ultrasound done with radiology. Patient agrees with plan and verbally understands. Order placed and number given to patient to call and schedule.

## 2022-04-11 NOTE — TELEPHONE ENCOUNTER
Patient is having difficulty scheduling her 20 week ultrasound at maternal fetal medicine. They do not have any level 1 appointments available and are suggesting a location in [place]. She is hoping to find an option closer to her home. Please advise.

## 2022-04-25 ENCOUNTER — LAB ENCOUNTER (OUTPATIENT)
Dept: LAB | Age: 27
End: 2022-04-25
Attending: ADVANCED PRACTICE MIDWIFE
Payer: COMMERCIAL

## 2022-04-25 DIAGNOSIS — D69.6 THROMBOCYTOPENIA AFFECTING PREGNANCY (HCC): ICD-10-CM

## 2022-04-25 DIAGNOSIS — O99.119 THROMBOCYTOPENIA AFFECTING PREGNANCY (HCC): ICD-10-CM

## 2022-04-25 LAB
DEPRECATED RDW RBC AUTO: 46.6 FL (ref 35.1–46.3)
ERYTHROCYTE [DISTWIDTH] IN BLOOD BY AUTOMATED COUNT: 14.3 % (ref 11–15)
HCT VFR BLD AUTO: 34.8 %
HGB BLD-MCNC: 11.6 G/DL
MCH RBC QN AUTO: 29 PG (ref 26–34)
MCHC RBC AUTO-ENTMCNC: 33.3 G/DL (ref 31–37)
MCV RBC AUTO: 87 FL
PLATELET # BLD AUTO: 204 10(3)UL (ref 150–450)
RBC # BLD AUTO: 4 X10(6)UL
WBC # BLD AUTO: 5.2 X10(3) UL (ref 4–11)

## 2022-04-25 PROCEDURE — 85027 COMPLETE CBC AUTOMATED: CPT

## 2022-04-25 PROCEDURE — 36415 COLL VENOUS BLD VENIPUNCTURE: CPT

## 2022-04-29 ENCOUNTER — TELEPHONE (OUTPATIENT)
Dept: OBGYN CLINIC | Facility: CLINIC | Age: 27
End: 2022-04-29

## 2022-04-29 NOTE — TELEPHONE ENCOUNTER
Pt was diagnosed w/ probable shingles. Has been prescribed an antiviral. Pt had chicken pox as a child. Discussed keeping area covered until blisters have crusted over. Pt to call if develops rash.  Pt verbalized an understanding & agrees w/ plan

## 2022-05-02 RX ORDER — LEVOTHYROXINE SODIUM 0.05 MG/1
TABLET ORAL
Qty: 90 TABLET | Refills: 1 | Status: SHIPPED | OUTPATIENT
Start: 2022-05-02

## 2022-05-04 ENCOUNTER — HOSPITAL ENCOUNTER (OUTPATIENT)
Dept: ULTRASOUND IMAGING | Facility: HOSPITAL | Age: 27
Discharge: HOME | End: 2022-05-04
Attending: ADVANCED PRACTICE MIDWIFE
Payer: COMMERCIAL

## 2022-05-04 DIAGNOSIS — Z34.82 ENCOUNTER FOR SUPERVISION OF OTHER NORMAL PREGNANCY IN SECOND TRIMESTER: ICD-10-CM

## 2022-05-04 PROCEDURE — 76805 OB US >/= 14 WKS SNGL FETUS: CPT | Performed by: ADVANCED PRACTICE MIDWIFE

## 2022-05-05 ENCOUNTER — TELEPHONE (OUTPATIENT)
Dept: OBGYN CLINIC | Facility: CLINIC | Age: 27
End: 2022-05-05

## 2022-05-05 ENCOUNTER — ROUTINE PRENATAL (OUTPATIENT)
Dept: OBGYN CLINIC | Facility: CLINIC | Age: 27
End: 2022-05-05
Payer: COMMERCIAL

## 2022-05-05 VITALS
BODY MASS INDEX: 24 KG/M2 | SYSTOLIC BLOOD PRESSURE: 114 MMHG | DIASTOLIC BLOOD PRESSURE: 78 MMHG | WEIGHT: 150.19 LBS | HEART RATE: 93 BPM

## 2022-05-05 DIAGNOSIS — Z34.82 ENCOUNTER FOR SUPERVISION OF OTHER NORMAL PREGNANCY IN SECOND TRIMESTER: Primary | ICD-10-CM

## 2022-05-05 LAB
APPEARANCE: CLEAR
BILIRUBIN: NEGATIVE
GLUCOSE (URINE DIPSTICK): NEGATIVE MG/DL
KETONES (URINE DIPSTICK): 80 MG/DL
LEUKOCYTES: NEGATIVE
MULTISTIX LOT#: ABNORMAL NUMERIC
NITRITE, URINE: NEGATIVE
OCCULT BLOOD: NEGATIVE
PH, URINE: 6.5 (ref 4.5–8)
SPECIFIC GRAVITY: 1.02 (ref 1–1.03)
URINE-COLOR: YELLOW
UROBILINOGEN,SEMI-QN: 0.2 MG/DL (ref 0–1.9)

## 2022-05-05 PROCEDURE — 3074F SYST BP LT 130 MM HG: CPT | Performed by: ADVANCED PRACTICE MIDWIFE

## 2022-05-05 PROCEDURE — 81002 URINALYSIS NONAUTO W/O SCOPE: CPT | Performed by: ADVANCED PRACTICE MIDWIFE

## 2022-05-05 PROCEDURE — 3078F DIAST BP <80 MM HG: CPT | Performed by: ADVANCED PRACTICE MIDWIFE

## 2022-05-05 NOTE — TELEPHONE ENCOUNTER
----- Message from Cal Swartz CNM sent at 5/4/2022  7:46 PM CDT -----  Please call normal 20 week u/s

## 2022-05-05 NOTE — PROGRESS NOTES
Feeling well. Endorses regular fetal movement. Denies LOF, vaginal bleeding, contractions. Reports left sided pelvic pain that is more noticeable with movement since having lunch today. Discussed possibility of GI versus round ligament. Encouraged ice and monitoring. If worsens or develops other symptoms instructed to call. Patient also states she has had increased anxiety over the last 2-3 weeks. Her  has been traveling so she believes it is likely due to that. She was seeing a counselor until about Jan or February but then stopped since she was doing well. Encouraged her to re-establish care. Will also consider increasing sertraline if symptoms continue. Reviewed warning signs and when to call. BRICE 2wks to check on anxiety.

## 2022-05-19 ENCOUNTER — ROUTINE PRENATAL (OUTPATIENT)
Dept: OBGYN CLINIC | Facility: CLINIC | Age: 27
End: 2022-05-19
Payer: COMMERCIAL

## 2022-05-19 VITALS
BODY MASS INDEX: 24 KG/M2 | HEART RATE: 75 BPM | DIASTOLIC BLOOD PRESSURE: 66 MMHG | SYSTOLIC BLOOD PRESSURE: 105 MMHG | WEIGHT: 154 LBS

## 2022-05-19 DIAGNOSIS — Z34.82 ENCOUNTER FOR SUPERVISION OF OTHER NORMAL PREGNANCY IN SECOND TRIMESTER: Primary | ICD-10-CM

## 2022-05-19 LAB
APPEARANCE: CLEAR
GLUCOSE (URINE DIPSTICK): NEGATIVE MG/DL
KETONES (URINE DIPSTICK): NEGATIVE MG/DL
MULTISTIX LOT#: ABNORMAL NUMERIC
NITRITE, URINE: NEGATIVE
OCCULT BLOOD: NEGATIVE
PH, URINE: 6.5 (ref 4.5–8)
SPECIFIC GRAVITY: 1.02 (ref 1–1.03)
URINE-COLOR: YELLOW
UROBILINOGEN,SEMI-QN: 0.2 MG/DL (ref 0–1.9)

## 2022-05-19 PROCEDURE — 3074F SYST BP LT 130 MM HG: CPT | Performed by: ADVANCED PRACTICE MIDWIFE

## 2022-05-19 PROCEDURE — 81002 URINALYSIS NONAUTO W/O SCOPE: CPT | Performed by: ADVANCED PRACTICE MIDWIFE

## 2022-05-19 PROCEDURE — 3078F DIAST BP <80 MM HG: CPT | Performed by: ADVANCED PRACTICE MIDWIFE

## 2022-06-06 ENCOUNTER — HOSPITAL ENCOUNTER (EMERGENCY)
Facility: HOSPITAL | Age: 27
Discharge: HOME | End: 2022-06-06
Payer: COMMERCIAL

## 2022-06-06 ENCOUNTER — TELEMEDICINE (OUTPATIENT)
Dept: OBGYN CLINIC | Facility: CLINIC | Age: 27
End: 2022-06-06

## 2022-06-06 ENCOUNTER — APPOINTMENT (OUTPATIENT)
Dept: GENERAL RADIOLOGY | Facility: HOSPITAL | Age: 27
End: 2022-06-06
Attending: NURSE PRACTITIONER
Payer: COMMERCIAL

## 2022-06-06 VITALS
DIASTOLIC BLOOD PRESSURE: 62 MMHG | SYSTOLIC BLOOD PRESSURE: 103 MMHG | RESPIRATION RATE: 18 BRPM | OXYGEN SATURATION: 97 % | TEMPERATURE: 99 F | HEART RATE: 90 BPM

## 2022-06-06 DIAGNOSIS — O98.512 COVID-19 AFFECTING PREGNANCY IN SECOND TRIMESTER: ICD-10-CM

## 2022-06-06 DIAGNOSIS — U07.1 COVID-19 AFFECTING PREGNANCY IN SECOND TRIMESTER: ICD-10-CM

## 2022-06-06 DIAGNOSIS — Z34.82 MULTIGRAVIDA IN SECOND TRIMESTER: Primary | ICD-10-CM

## 2022-06-06 DIAGNOSIS — U07.1 COVID-19: Primary | ICD-10-CM

## 2022-06-06 PROCEDURE — 93005 ELECTROCARDIOGRAM TRACING: CPT

## 2022-06-06 PROCEDURE — 71045 X-RAY EXAM CHEST 1 VIEW: CPT | Performed by: NURSE PRACTITIONER

## 2022-06-06 PROCEDURE — 93010 ELECTROCARDIOGRAM REPORT: CPT | Performed by: INTERNAL MEDICINE

## 2022-06-06 PROCEDURE — 99283 EMERGENCY DEPT VISIT LOW MDM: CPT

## 2022-06-06 NOTE — PROGRESS NOTES
Virtual visit due to covid. [facility] reports having mild covid symptoms on Saturday, with scratchy throat. Daughter had tested positive on Friday. She took a home test Sunday morning which was positive. Yesterday she had mild fever (100.5), fatigue, cough. Fever seems to be resolved today. Continues to have fatigue and cough. Reports if she is singing her daughter a song she has some SOB. No SOB with ambulation. Denies chest pain. Instructed her to go to ED if develops more pronounced SOB, chest pain or fever not relieved with ibuprofen. Encouraged increased hydration and rest. Reviewed supplementation with vit C, vit d, zinc and melatonin. Has been feeling regular fetal movement. Denies LOF, vaginal bleeding, contractions. Reviewed pregnancy warning signs and when to call. Has BRICE scheduled for next week.

## 2022-06-06 NOTE — ED INITIAL ASSESSMENT (HPI)
Patient tested positive for covid yesterday and feels short of breath. Patient is 24 weeks pregnant and per midwife to come to ER if short of breath. Patient reports fever yesterday.  No N/V.

## 2022-06-10 ENCOUNTER — TELEPHONE (OUTPATIENT)
Dept: OBGYN CLINIC | Facility: CLINIC | Age: 27
End: 2022-06-10

## 2022-06-16 ENCOUNTER — ROUTINE PRENATAL (OUTPATIENT)
Dept: OBGYN CLINIC | Facility: CLINIC | Age: 27
End: 2022-06-16
Payer: COMMERCIAL

## 2022-06-16 VITALS
SYSTOLIC BLOOD PRESSURE: 99 MMHG | HEART RATE: 81 BPM | WEIGHT: 158 LBS | DIASTOLIC BLOOD PRESSURE: 55 MMHG | BODY MASS INDEX: 25 KG/M2

## 2022-06-16 DIAGNOSIS — Z34.82 MULTIGRAVIDA IN SECOND TRIMESTER: Primary | ICD-10-CM

## 2022-06-16 LAB
APPEARANCE: CLEAR
BILIRUBIN: NEGATIVE
GLUCOSE (URINE DIPSTICK): NEGATIVE MG/DL
KETONES (URINE DIPSTICK): NEGATIVE MG/DL
LEUKOCYTES: NEGATIVE
MULTISTIX LOT#: NORMAL NUMERIC
NITRITE, URINE: NEGATIVE
OCCULT BLOOD: NEGATIVE
PH, URINE: 7.5 (ref 4.5–8)
PROTEIN (URINE DIPSTICK): NEGATIVE MG/DL
SPECIFIC GRAVITY: 1.01 (ref 1–1.03)
URINE-COLOR: YELLOW
UROBILINOGEN,SEMI-QN: 1 MG/DL (ref 0–1.9)

## 2022-06-16 PROCEDURE — 81002 URINALYSIS NONAUTO W/O SCOPE: CPT | Performed by: ADVANCED PRACTICE MIDWIFE

## 2022-06-16 PROCEDURE — 3078F DIAST BP <80 MM HG: CPT | Performed by: ADVANCED PRACTICE MIDWIFE

## 2022-06-16 PROCEDURE — 3074F SYST BP LT 130 MM HG: CPT | Performed by: ADVANCED PRACTICE MIDWIFE

## 2022-06-16 NOTE — PROGRESS NOTES
Feeling well. Endorses regular fetal movement. Denies LOF, vaginal bleeding, contractions. Covid symptoms have resolved. Patient reports non-itchy rash of red dots on chest and arms. Reports had same thing in last pregnancy. Encouraged to monitor. Discussed 3rd trimester labs. Instructed to complete at 28wks. Reviewed warning signs and when to call.  BRICE 2-3wks

## 2022-07-03 DIAGNOSIS — N92.6 IRREGULAR PERIODS: ICD-10-CM

## 2022-07-03 DIAGNOSIS — R53.83 OTHER FATIGUE: ICD-10-CM

## 2022-07-03 DIAGNOSIS — E03.9 HYPOTHYROIDISM (ACQUIRED): ICD-10-CM

## 2022-07-03 DIAGNOSIS — F41.1 GAD (GENERALIZED ANXIETY DISORDER): ICD-10-CM

## 2022-07-03 DIAGNOSIS — E55.9 VITAMIN D DEFICIENCY: ICD-10-CM

## 2022-07-06 ENCOUNTER — ROUTINE PRENATAL (OUTPATIENT)
Dept: OBGYN CLINIC | Facility: CLINIC | Age: 27
End: 2022-07-06
Payer: COMMERCIAL

## 2022-07-06 ENCOUNTER — LAB ENCOUNTER (OUTPATIENT)
Dept: LAB | Facility: HOSPITAL | Age: 27
End: 2022-07-06
Attending: ADVANCED PRACTICE MIDWIFE
Payer: COMMERCIAL

## 2022-07-06 VITALS
BODY MASS INDEX: 25 KG/M2 | HEART RATE: 80 BPM | WEIGHT: 162 LBS | DIASTOLIC BLOOD PRESSURE: 65 MMHG | SYSTOLIC BLOOD PRESSURE: 102 MMHG

## 2022-07-06 DIAGNOSIS — U07.1 COVID-19 AFFECTING PREGNANCY IN SECOND TRIMESTER: ICD-10-CM

## 2022-07-06 DIAGNOSIS — Z34.82 MULTIGRAVIDA IN SECOND TRIMESTER: ICD-10-CM

## 2022-07-06 DIAGNOSIS — Z34.82 ENCOUNTER FOR SUPERVISION OF OTHER NORMAL PREGNANCY IN SECOND TRIMESTER: Primary | ICD-10-CM

## 2022-07-06 DIAGNOSIS — O98.512 COVID-19 AFFECTING PREGNANCY IN SECOND TRIMESTER: ICD-10-CM

## 2022-07-06 PROBLEM — O09.299 HISTORY OF DELIVERY BY VACUUM EXTRACTION, CURRENTLY PREGNANT: Status: ACTIVE | Noted: 2022-07-06

## 2022-07-06 PROBLEM — O09.299 HISTORY OF DELIVERY BY VACUUM EXTRACTION, CURRENTLY PREGNANT (HCC): Status: ACTIVE | Noted: 2022-07-06

## 2022-07-06 LAB
ANTIBODY SCREEN: NEGATIVE
APPEARANCE: CLEAR
BILIRUBIN: NEGATIVE
GLUCOSE (URINE DIPSTICK): NEGATIVE MG/DL
GLUCOSE 1H P GLC SERPL-MCNC: 118 MG/DL
KETONES (URINE DIPSTICK): 40 MG/DL
LEUKOCYTES: NEGATIVE
MULTISTIX LOT#: ABNORMAL NUMERIC
NITRITE, URINE: NEGATIVE
OCCULT BLOOD: NEGATIVE
PH, URINE: 6.5 (ref 4.5–8)
PROTEIN (URINE DIPSTICK): NEGATIVE MG/DL
SPECIFIC GRAVITY: 1.02 (ref 1–1.03)
URINE-COLOR: YELLOW
UROBILINOGEN,SEMI-QN: 0.2 MG/DL (ref 0–1.9)

## 2022-07-06 PROCEDURE — 87389 HIV-1 AG W/HIV-1&-2 AB AG IA: CPT

## 2022-07-06 PROCEDURE — 82950 GLUCOSE TEST: CPT

## 2022-07-06 PROCEDURE — 81002 URINALYSIS NONAUTO W/O SCOPE: CPT | Performed by: ADVANCED PRACTICE MIDWIFE

## 2022-07-06 PROCEDURE — 86850 RBC ANTIBODY SCREEN: CPT

## 2022-07-06 PROCEDURE — 36415 COLL VENOUS BLD VENIPUNCTURE: CPT

## 2022-07-06 PROCEDURE — 86780 TREPONEMA PALLIDUM: CPT

## 2022-07-06 PROCEDURE — 3074F SYST BP LT 130 MM HG: CPT | Performed by: ADVANCED PRACTICE MIDWIFE

## 2022-07-06 PROCEDURE — 3078F DIAST BP <80 MM HG: CPT | Performed by: ADVANCED PRACTICE MIDWIFE

## 2022-07-06 NOTE — PROGRESS NOTES
Active fetus Denies any complaints. Denies any vaginal bleeding, leaking of fluid or vaginal discharge. No signs signs of PTL. Reviewed S&S of PTL  Warning signs reviewed  All questions answered.   Growth ultrasound ordered

## 2022-07-08 LAB — T PALLIDUM AB SER QL: NEGATIVE

## 2022-07-11 ENCOUNTER — TELEPHONE (OUTPATIENT)
Dept: OBGYN CLINIC | Facility: CLINIC | Age: 27
End: 2022-07-11

## 2022-07-11 NOTE — TELEPHONE ENCOUNTER
Spoke with pt who reports she was constipated on Saturday and today is experiencing diarrhea and gas pain. Pt denies vaginal bleeding or LOF. Pt reports normal FM. OTC med list reviewed with pt. Advised pt to keep herself well hydrated and follow a bland diet. Advised pt to call back if she has no relief in symptoms, if she is having severe pain, vaginal bleeding, LOF, or decreased FM. Pt agreed and voiced understanding.

## 2022-07-20 ENCOUNTER — ROUTINE PRENATAL (OUTPATIENT)
Dept: OBGYN CLINIC | Facility: CLINIC | Age: 27
End: 2022-07-20
Payer: COMMERCIAL

## 2022-07-20 ENCOUNTER — LAB ENCOUNTER (OUTPATIENT)
Dept: LAB | Facility: HOSPITAL | Age: 27
End: 2022-07-20
Attending: ADVANCED PRACTICE MIDWIFE
Payer: COMMERCIAL

## 2022-07-20 VITALS
WEIGHT: 164 LBS | DIASTOLIC BLOOD PRESSURE: 67 MMHG | SYSTOLIC BLOOD PRESSURE: 105 MMHG | HEART RATE: 73 BPM | BODY MASS INDEX: 26 KG/M2

## 2022-07-20 DIAGNOSIS — E03.9 HYPOTHYROIDISM, UNSPECIFIED TYPE: ICD-10-CM

## 2022-07-20 DIAGNOSIS — Z34.83 PRENATAL CARE, SUBSEQUENT PREGNANCY IN THIRD TRIMESTER: Primary | ICD-10-CM

## 2022-07-20 LAB
T4 FREE SERPL-MCNC: 0.9 NG/DL (ref 0.8–1.7)
TSI SER-ACNC: 1.7 MIU/ML (ref 0.36–3.74)

## 2022-07-20 PROCEDURE — 90471 IMMUNIZATION ADMIN: CPT | Performed by: ADVANCED PRACTICE MIDWIFE

## 2022-07-20 PROCEDURE — 36415 COLL VENOUS BLD VENIPUNCTURE: CPT

## 2022-07-20 PROCEDURE — 84439 ASSAY OF FREE THYROXINE: CPT

## 2022-07-20 PROCEDURE — 3074F SYST BP LT 130 MM HG: CPT | Performed by: ADVANCED PRACTICE MIDWIFE

## 2022-07-20 PROCEDURE — 90715 TDAP VACCINE 7 YRS/> IM: CPT | Performed by: ADVANCED PRACTICE MIDWIFE

## 2022-07-20 PROCEDURE — 3078F DIAST BP <80 MM HG: CPT | Performed by: ADVANCED PRACTICE MIDWIFE

## 2022-07-20 PROCEDURE — 84443 ASSAY THYROID STIM HORMONE: CPT

## 2022-07-20 NOTE — PROGRESS NOTES
Blayne Lamas, is at 31w0d, here for her Raisa Lopez 9095 visit. Currently, she is feeling well. Denies 3rd trimester danger signs. Has 3T growth scheduled for 1-2 weeks from now. Has been taking Synthroid, same dose, throughout the pregnancy. Managed by PCP but has not seen her during pregnancy. Accepts Tdap today. Vital signs and weight reviewed  See flowsheets     Today's Assessment/Plan: Tdap today  Hypothyroid: TSH nad fT4 drawn today. Pt to scheduled with PCP for followup  Next visit: 2 weeks    Reviewed:   Prenatal visit schedule  Recommendations and rationale for Tdap in pregnancy   labor precautions  Kick counts  Danger signs    Pt verbalized understanding. All questions answered.  No barriers to learning identified

## 2022-07-21 ENCOUNTER — TELEPHONE (OUTPATIENT)
Dept: OBGYN CLINIC | Facility: CLINIC | Age: 27
End: 2022-07-21

## 2022-08-03 ENCOUNTER — HOSPITAL ENCOUNTER (OUTPATIENT)
Dept: PERINATAL CARE | Facility: HOSPITAL | Age: 27
Discharge: HOME | End: 2022-08-03
Attending: ADVANCED PRACTICE MIDWIFE
Payer: COMMERCIAL

## 2022-08-03 ENCOUNTER — HOSPITAL ENCOUNTER (OUTPATIENT)
Dept: PERINATAL CARE | Facility: HOSPITAL | Age: 27
Discharge: HOME | End: 2022-08-03
Attending: OBSTETRICS & GYNECOLOGY
Payer: COMMERCIAL

## 2022-08-03 ENCOUNTER — APPOINTMENT (OUTPATIENT)
Dept: ULTRASOUND IMAGING | Facility: HOSPITAL | Age: 27
End: 2022-08-03
Attending: ADVANCED PRACTICE MIDWIFE
Payer: COMMERCIAL

## 2022-08-03 VITALS
DIASTOLIC BLOOD PRESSURE: 67 MMHG | BODY MASS INDEX: 26 KG/M2 | SYSTOLIC BLOOD PRESSURE: 106 MMHG | HEART RATE: 91 BPM | WEIGHT: 166 LBS

## 2022-08-03 DIAGNOSIS — O98.512 COVID-19 AFFECTING PREGNANCY IN SECOND TRIMESTER: ICD-10-CM

## 2022-08-03 DIAGNOSIS — U07.1 COVID-19 AFFECTING PREGNANCY IN SECOND TRIMESTER: ICD-10-CM

## 2022-08-03 DIAGNOSIS — E03.9 HYPOTHYROIDISM, UNSPECIFIED TYPE: ICD-10-CM

## 2022-08-03 DIAGNOSIS — E03.9 HYPOTHYROIDISM, UNSPECIFIED TYPE: Primary | ICD-10-CM

## 2022-08-03 PROCEDURE — 76819 FETAL BIOPHYS PROFIL W/O NST: CPT

## 2022-08-03 PROCEDURE — 76816 OB US FOLLOW-UP PER FETUS: CPT | Performed by: OBSTETRICS & GYNECOLOGY

## 2022-08-04 ENCOUNTER — ROUTINE PRENATAL (OUTPATIENT)
Dept: OBGYN CLINIC | Facility: CLINIC | Age: 27
End: 2022-08-04
Payer: COMMERCIAL

## 2022-08-04 VITALS
SYSTOLIC BLOOD PRESSURE: 109 MMHG | BODY MASS INDEX: 26 KG/M2 | DIASTOLIC BLOOD PRESSURE: 70 MMHG | WEIGHT: 165 LBS | HEART RATE: 83 BPM

## 2022-08-04 DIAGNOSIS — Z34.83 ENCOUNTER FOR SUPERVISION OF OTHER NORMAL PREGNANCY IN THIRD TRIMESTER: Primary | ICD-10-CM

## 2022-08-04 PROCEDURE — 3078F DIAST BP <80 MM HG: CPT | Performed by: ADVANCED PRACTICE MIDWIFE

## 2022-08-04 PROCEDURE — 3074F SYST BP LT 130 MM HG: CPT | Performed by: ADVANCED PRACTICE MIDWIFE

## 2022-08-04 NOTE — PROGRESS NOTES
Mo Brennan is a 32year old , at 33w1d, here for her return OB visit. Currently, she is feeling well. Denies regular contractions, bleeding and leakage of fluid. Endorses active fetus. Has some questions about labor and birth process. Vital signs and weight reviewed  See flowsheets     Today's Assessment/Plan:   1. Addressed patients questions about labor and birth. Next visit: 2 weeks    Reviewed:   3rd trimester precautions and expectations   labor precautions  Kick counts  Danger signs  Prenatal visit schedule    Pt verbalized understanding. All questions answered.  No barriers to learning identified

## 2022-08-18 ENCOUNTER — ROUTINE PRENATAL (OUTPATIENT)
Dept: OBGYN CLINIC | Facility: CLINIC | Age: 27
End: 2022-08-18
Payer: COMMERCIAL

## 2022-08-18 VITALS
WEIGHT: 167 LBS | BODY MASS INDEX: 26 KG/M2 | HEART RATE: 79 BPM | DIASTOLIC BLOOD PRESSURE: 75 MMHG | SYSTOLIC BLOOD PRESSURE: 119 MMHG

## 2022-08-18 DIAGNOSIS — Z34.83 ENCOUNTER FOR SUPERVISION OF OTHER NORMAL PREGNANCY IN THIRD TRIMESTER: Primary | ICD-10-CM

## 2022-08-18 PROCEDURE — 3074F SYST BP LT 130 MM HG: CPT | Performed by: ADVANCED PRACTICE MIDWIFE

## 2022-08-18 PROCEDURE — 3078F DIAST BP <80 MM HG: CPT | Performed by: ADVANCED PRACTICE MIDWIFE

## 2022-08-19 NOTE — PROGRESS NOTES
Feeling well. Endorses regular fetal movement. Denies contractions, LOF, vaginal bleeding. Reviewed warning signs and when to call.  BRICE 1 wks

## 2022-08-25 ENCOUNTER — ROUTINE PRENATAL (OUTPATIENT)
Dept: OBGYN CLINIC | Facility: CLINIC | Age: 27
End: 2022-08-25
Payer: COMMERCIAL

## 2022-08-25 VITALS
SYSTOLIC BLOOD PRESSURE: 103 MMHG | DIASTOLIC BLOOD PRESSURE: 63 MMHG | BODY MASS INDEX: 27 KG/M2 | HEART RATE: 92 BPM | WEIGHT: 171 LBS

## 2022-08-25 DIAGNOSIS — Z34.83 ENCOUNTER FOR SUPERVISION OF OTHER NORMAL PREGNANCY IN THIRD TRIMESTER: Primary | ICD-10-CM

## 2022-08-25 PROCEDURE — 3074F SYST BP LT 130 MM HG: CPT | Performed by: ADVANCED PRACTICE MIDWIFE

## 2022-08-25 PROCEDURE — 3078F DIAST BP <80 MM HG: CPT | Performed by: ADVANCED PRACTICE MIDWIFE

## 2022-08-25 NOTE — PROGRESS NOTES
Feeling well. Endorses regular fetal movement. Denies LOF, vaginal bleeding, contractions. Does report tingling in legs when standing for a long time. Goes away when she sits. Denies redness, pain, swelling unilaterally. Likely due to baby position and blood flow. Reviewed warning signs of blood clot. GBS done today. Reviewed labor and warning signs.  BRICE 1wk

## 2022-08-27 LAB — GROUP B STREP BY PCR FOR PCR OVT: NEGATIVE

## 2022-09-02 ENCOUNTER — ROUTINE PRENATAL (OUTPATIENT)
Dept: OBGYN CLINIC | Facility: CLINIC | Age: 27
End: 2022-09-02
Payer: COMMERCIAL

## 2022-09-02 VITALS
DIASTOLIC BLOOD PRESSURE: 69 MMHG | WEIGHT: 168.63 LBS | BODY MASS INDEX: 26 KG/M2 | SYSTOLIC BLOOD PRESSURE: 107 MMHG | HEART RATE: 101 BPM

## 2022-09-02 DIAGNOSIS — Z34.83 ENCOUNTER FOR SUPERVISION OF OTHER NORMAL PREGNANCY IN THIRD TRIMESTER: Primary | ICD-10-CM

## 2022-09-02 PROCEDURE — 3078F DIAST BP <80 MM HG: CPT | Performed by: ADVANCED PRACTICE MIDWIFE

## 2022-09-02 PROCEDURE — 3074F SYST BP LT 130 MM HG: CPT | Performed by: ADVANCED PRACTICE MIDWIFE

## 2022-09-02 NOTE — PROGRESS NOTES
Baby active, no SOL, some clary metz. Denies LOF or bleeding. May need Pelvic floor PT PP for urinary incontinence. Planning unmedicated, hydrotherapy but does not want waterbirth. Ok with routine meds. Its a girl. Aware placenta to path for Covid. Outside Peds. NFP/condoms for birth control. Does not like idea of IUD and had bad side effects with Pill. Importance of active FM, SOL and warning signs reviewed. Last baby vacuum- baby had big head (90% per pt) and pushed x 4 hrs. This baby had smaller head at last ultrasound.  Baby OP- recommend [facility]

## 2022-09-07 ENCOUNTER — ROUTINE PRENATAL (OUTPATIENT)
Dept: OBGYN CLINIC | Facility: CLINIC | Age: 27
End: 2022-09-07
Payer: COMMERCIAL

## 2022-09-07 VITALS
HEART RATE: 82 BPM | WEIGHT: 172 LBS | SYSTOLIC BLOOD PRESSURE: 106 MMHG | BODY MASS INDEX: 27 KG/M2 | DIASTOLIC BLOOD PRESSURE: 69 MMHG

## 2022-09-07 DIAGNOSIS — M54.42 ACUTE BILATERAL LOW BACK PAIN WITH LEFT-SIDED SCIATICA: ICD-10-CM

## 2022-09-07 DIAGNOSIS — Z34.83 PRENATAL CARE, SUBSEQUENT PREGNANCY IN THIRD TRIMESTER: Primary | ICD-10-CM

## 2022-09-07 PROCEDURE — 3074F SYST BP LT 130 MM HG: CPT | Performed by: ADVANCED PRACTICE MIDWIFE

## 2022-09-07 PROCEDURE — 3078F DIAST BP <80 MM HG: CPT | Performed by: ADVANCED PRACTICE MIDWIFE

## 2022-09-07 NOTE — PROGRESS NOTES
Daryl Woody, is at 38w0d, here for her Raisa Lopez 9023 visit. Currently, she is feeling well. Denies 3rd trimester danger signs. Notes some lower back pain with left-sided sciatica. Vital signs and weight reviewed  See flowsheets     Today's Assessment/Plan: Back exercises in AVS  Next visit: 1 week    Reviewed:   Prenatal visit schedule  Kick counts  Danger signs  Labor precautions    Pt verbalized understanding. All questions answered.  No barriers to learning identified

## 2022-09-14 ENCOUNTER — ROUTINE PRENATAL (OUTPATIENT)
Dept: OBGYN CLINIC | Facility: CLINIC | Age: 27
End: 2022-09-14
Payer: COMMERCIAL

## 2022-09-14 VITALS
SYSTOLIC BLOOD PRESSURE: 109 MMHG | DIASTOLIC BLOOD PRESSURE: 66 MMHG | BODY MASS INDEX: 27 KG/M2 | WEIGHT: 171 LBS | HEART RATE: 96 BPM

## 2022-09-14 DIAGNOSIS — Z34.83 PRENATAL CARE, SUBSEQUENT PREGNANCY IN THIRD TRIMESTER: Primary | ICD-10-CM

## 2022-09-14 PROCEDURE — 3078F DIAST BP <80 MM HG: CPT | Performed by: ADVANCED PRACTICE MIDWIFE

## 2022-09-14 PROCEDURE — 3074F SYST BP LT 130 MM HG: CPT | Performed by: ADVANCED PRACTICE MIDWIFE

## 2022-09-14 NOTE — PROGRESS NOTES
Rell Vega, is at 39w0d, here for her Raisa Lopez 9045 visit. Currently, she is feeling well. Denies 3rd trimester danger signs. Declines membrane sweep after review of risks/benefits. Vital signs and weight reviewed  See flowsheets     Today's Assessment/Plan: Care is up to date  Next visit: 1 week    Reviewed:   Prenatal visit schedule  Kick counts  Danger signs  Labor precautions    Pt verbalized understanding. All questions answered.  No barriers to learning identified

## 2022-09-21 ENCOUNTER — ROUTINE PRENATAL (OUTPATIENT)
Dept: OBGYN CLINIC | Facility: CLINIC | Age: 27
End: 2022-09-21

## 2022-09-21 VITALS
WEIGHT: 178 LBS | BODY MASS INDEX: 28 KG/M2 | SYSTOLIC BLOOD PRESSURE: 109 MMHG | HEART RATE: 92 BPM | DIASTOLIC BLOOD PRESSURE: 72 MMHG

## 2022-09-21 DIAGNOSIS — Z34.83 PRENATAL CARE, SUBSEQUENT PREGNANCY IN THIRD TRIMESTER: Primary | ICD-10-CM

## 2022-09-21 PROCEDURE — 3078F DIAST BP <80 MM HG: CPT | Performed by: ADVANCED PRACTICE MIDWIFE

## 2022-09-21 PROCEDURE — 3074F SYST BP LT 130 MM HG: CPT | Performed by: ADVANCED PRACTICE MIDWIFE

## 2022-09-21 NOTE — PROGRESS NOTES
Rell Vega, is at 40w0d, here for her Raisa Lopez 9070 visit. Currently, she is feeling well. Denies 3rd trimester danger signs. Having some irregular contractions. Desires membrane sweep today after review of risks/benefits. Vital signs and weight reviewed  See flowsheets     Today's Assessment/Plan: Membranes swept  Next visit: 1 week if not delivered    Reviewed:   Prenatal visit schedule  Kick counts  Danger signs  Labor precautions    Pt verbalized understanding. All questions answered.  No barriers to learning identified

## 2022-09-28 ENCOUNTER — ROUTINE PRENATAL (OUTPATIENT)
Dept: OBGYN CLINIC | Facility: CLINIC | Age: 27
End: 2022-09-28

## 2022-09-28 VITALS
HEART RATE: 105 BPM | BODY MASS INDEX: 27 KG/M2 | WEIGHT: 175 LBS | SYSTOLIC BLOOD PRESSURE: 121 MMHG | DIASTOLIC BLOOD PRESSURE: 76 MMHG

## 2022-09-28 DIAGNOSIS — Z34.83 PRENATAL CARE, SUBSEQUENT PREGNANCY IN THIRD TRIMESTER: Primary | ICD-10-CM

## 2022-09-28 PROCEDURE — 3078F DIAST BP <80 MM HG: CPT | Performed by: ADVANCED PRACTICE MIDWIFE

## 2022-09-28 PROCEDURE — 3074F SYST BP LT 130 MM HG: CPT | Performed by: ADVANCED PRACTICE MIDWIFE

## 2022-09-28 NOTE — PROGRESS NOTES
Rose Yates, is at 41w0d, here for her Raisa Lopez [facility] visit. Currently, she is feeling well. Denies 3rd trimester danger signs. Desires membrane sweep again this week. Amenable to 41w5d IOL. Vital signs and weight reviewed  See flowsheets     Today's Assessment/Plan: IOL scheduled for 10/3 at [address]  Next visit: 2 weeks postpartum    Reviewed:   Prenatal visit schedule  Kick counts  Danger signs  Labor precautions    Pt verbalized understanding. All questions answered.  No barriers to learning identified

## 2022-10-01 ENCOUNTER — HOSPITAL ENCOUNTER (INPATIENT)
Facility: HOSPITAL | Age: 27
LOS: 2 days | Discharge: HOME | End: 2022-10-03
Attending: ADVANCED PRACTICE MIDWIFE | Admitting: OBSTETRICS & GYNECOLOGY
Payer: COMMERCIAL

## 2022-10-01 PROBLEM — Z34.90 PREGNANCY: Status: ACTIVE | Noted: 2022-10-01

## 2022-10-01 PROBLEM — O48.0 POST TERM PREGNANCY (HCC): Status: ACTIVE | Noted: 2022-10-01

## 2022-10-01 PROBLEM — O48.0 POST TERM PREGNANCY: Status: ACTIVE | Noted: 2022-10-01

## 2022-10-01 PROBLEM — Z34.90 PREGNANCY (HCC): Status: ACTIVE | Noted: 2022-10-01

## 2022-10-01 LAB
ANTIBODY SCREEN: NEGATIVE
BASOPHILS # BLD AUTO: 0.02 X10(3) UL (ref 0–0.2)
BASOPHILS NFR BLD AUTO: 0.2 %
DEPRECATED RDW RBC AUTO: 42.3 FL (ref 35.1–46.3)
EOSINOPHIL # BLD AUTO: 0.1 X10(3) UL (ref 0–0.7)
EOSINOPHIL NFR BLD AUTO: 1 %
ERYTHROCYTE [DISTWIDTH] IN BLOOD BY AUTOMATED COUNT: 12.8 % (ref 11–15)
HCT VFR BLD AUTO: 35.8 %
HGB BLD-MCNC: 12 G/DL
IMM GRANULOCYTES # BLD AUTO: 0.1 X10(3) UL (ref 0–1)
IMM GRANULOCYTES NFR BLD: 1 %
LYMPHOCYTES # BLD AUTO: 1.74 X10(3) UL (ref 1–4)
LYMPHOCYTES NFR BLD AUTO: 16.6 %
MCH RBC QN AUTO: 30.5 PG (ref 26–34)
MCHC RBC AUTO-ENTMCNC: 33.5 G/DL (ref 31–37)
MCV RBC AUTO: 90.9 FL
MONOCYTES # BLD AUTO: 0.75 X10(3) UL (ref 0.1–1)
MONOCYTES NFR BLD AUTO: 7.2 %
NEUTROPHILS # BLD AUTO: 7.77 X10 (3) UL (ref 1.5–7.7)
NEUTROPHILS # BLD AUTO: 7.77 X10(3) UL (ref 1.5–7.7)
NEUTROPHILS NFR BLD AUTO: 74 %
PLATELET # BLD AUTO: 228 10(3)UL (ref 150–450)
RBC # BLD AUTO: 3.94 X10(6)UL
RH BLOOD TYPE: POSITIVE
SARS-COV-2 RNA RESP QL NAA+PROBE: NOT DETECTED
WBC # BLD AUTO: 10.5 X10(3) UL (ref 4–11)

## 2022-10-01 PROCEDURE — 10907ZC DRAINAGE OF AMNIOTIC FLUID, THERAPEUTIC FROM PRODUCTS OF CONCEPTION, VIA NATURAL OR ARTIFICIAL OPENING: ICD-10-PCS | Performed by: ADVANCED PRACTICE MIDWIFE

## 2022-10-01 RX ORDER — LIDOCAINE HYDROCHLORIDE 10 MG/ML
30 INJECTION, SOLUTION EPIDURAL; INFILTRATION; INTRACAUDAL; PERINEURAL ONCE
Status: DISCONTINUED | OUTPATIENT
Start: 2022-10-01 | End: 2022-10-02

## 2022-10-01 RX ORDER — TRISODIUM CITRATE DIHYDRATE AND CITRIC ACID MONOHYDRATE 500; 334 MG/5ML; MG/5ML
30 SOLUTION ORAL AS NEEDED
Status: DISCONTINUED | OUTPATIENT
Start: 2022-10-01 | End: 2022-10-02

## 2022-10-01 RX ORDER — IBUPROFEN 600 MG/1
600 TABLET ORAL EVERY 6 HOURS PRN
Status: DISCONTINUED | OUTPATIENT
Start: 2022-10-01 | End: 2022-10-02

## 2022-10-01 RX ORDER — ACETAMINOPHEN 500 MG
500 TABLET ORAL EVERY 6 HOURS PRN
Status: DISCONTINUED | OUTPATIENT
Start: 2022-10-01 | End: 2022-10-02

## 2022-10-01 RX ORDER — AMMONIA INHALANTS 0.04 G/.3ML
0.3 INHALANT RESPIRATORY (INHALATION) AS NEEDED
Status: DISCONTINUED | OUTPATIENT
Start: 2022-10-01 | End: 2022-10-02

## 2022-10-01 RX ORDER — TERBUTALINE SULFATE 1 MG/ML
0.25 INJECTION, SOLUTION SUBCUTANEOUS AS NEEDED
Status: DISCONTINUED | OUTPATIENT
Start: 2022-10-01 | End: 2022-10-02

## 2022-10-01 RX ORDER — ONDANSETRON 2 MG/ML
4 INJECTION INTRAMUSCULAR; INTRAVENOUS EVERY 6 HOURS PRN
Status: DISCONTINUED | OUTPATIENT
Start: 2022-10-01 | End: 2022-10-02

## 2022-10-01 RX ORDER — LEVOTHYROXINE SODIUM 0.05 MG/1
50 TABLET ORAL
Status: DISCONTINUED | OUTPATIENT
Start: 2022-10-02 | End: 2022-10-02

## 2022-10-02 LAB — HGB BLD-MCNC: 10.6 G/DL

## 2022-10-02 PROCEDURE — 59414 DELIVER PLACENTA: CPT | Performed by: OBSTETRICS & GYNECOLOGY

## 2022-10-02 PROCEDURE — 59400 OBSTETRICAL CARE: CPT | Performed by: ADVANCED PRACTICE MIDWIFE

## 2022-10-02 RX ORDER — MONTELUKAST SODIUM 10 MG/1
10 TABLET ORAL DAILY
Status: DISCONTINUED | OUTPATIENT
Start: 2022-10-02 | End: 2022-10-03

## 2022-10-02 RX ORDER — DIAPER,BRIEF,INFANT-TODD,DISP
1 EACH MISCELLANEOUS EVERY 6 HOURS PRN
Status: DISCONTINUED | OUTPATIENT
Start: 2022-10-02 | End: 2022-10-03

## 2022-10-02 RX ORDER — ONDANSETRON 2 MG/ML
4 INJECTION INTRAMUSCULAR; INTRAVENOUS EVERY 6 HOURS PRN
Status: DISCONTINUED | OUTPATIENT
Start: 2022-10-02 | End: 2022-10-03

## 2022-10-02 RX ORDER — IBUPROFEN 600 MG/1
600 TABLET ORAL EVERY 6 HOURS
Status: DISCONTINUED | OUTPATIENT
Start: 2022-10-02 | End: 2022-10-03

## 2022-10-02 RX ORDER — SIMETHICONE 80 MG
80 TABLET,CHEWABLE ORAL 3 TIMES DAILY PRN
Status: DISCONTINUED | OUTPATIENT
Start: 2022-10-02 | End: 2022-10-03

## 2022-10-02 RX ORDER — METHYLERGONOVINE MALEATE 0.2 MG/ML
INJECTION INTRAVENOUS
Status: COMPLETED
Start: 2022-10-02 | End: 2022-10-02

## 2022-10-02 RX ORDER — DOCUSATE SODIUM 100 MG/1
100 CAPSULE, LIQUID FILLED ORAL
Status: DISCONTINUED | OUTPATIENT
Start: 2022-10-02 | End: 2022-10-03

## 2022-10-02 RX ORDER — AMMONIA INHALANTS 0.04 G/.3ML
0.3 INHALANT RESPIRATORY (INHALATION) AS NEEDED
Status: DISCONTINUED | OUTPATIENT
Start: 2022-10-02 | End: 2022-10-03

## 2022-10-02 RX ORDER — MISOPROSTOL 200 UG/1
TABLET ORAL
Status: COMPLETED
Start: 2022-10-02 | End: 2022-10-02

## 2022-10-02 RX ORDER — ACETAMINOPHEN 500 MG
1000 TABLET ORAL EVERY 6 HOURS PRN
Status: DISCONTINUED | OUTPATIENT
Start: 2022-10-02 | End: 2022-10-03

## 2022-10-02 RX ORDER — BISACODYL 10 MG
10 SUPPOSITORY, RECTAL RECTAL ONCE AS NEEDED
Status: DISCONTINUED | OUTPATIENT
Start: 2022-10-02 | End: 2022-10-03

## 2022-10-02 RX ORDER — ACETAMINOPHEN 500 MG
500 TABLET ORAL EVERY 6 HOURS PRN
Status: DISCONTINUED | OUTPATIENT
Start: 2022-10-02 | End: 2022-10-03

## 2022-10-02 NOTE — PROGRESS NOTES
Pt is a 32year old female transfered to [facility]/355-A. Report received. Oriented to room, staff, and plan of care.

## 2022-10-02 NOTE — L&D DELIVERY NOTE
Balaji De Luna, Girl [[id number]]    Labor Events     labor?: No   steroids?: None  Antibiotics received during labor?: No  Antibiotics (enter # doses in comment): none  Rupture date/time: 10/1/2022 2131     Rupture type: AROM  Fluid color: Clear  Induction: None  Augmentation: AROM  Indications for augmentation: Ineffective Contraction Pattern  Intrapartum & labor complications: None     Labor Event Times    Labor onset date/time: 10/1/2022 2131  Dilation complete date/time: 10/2/2022 0108  Start pushing date/time: 10/1/2022 0051     [place] Presentation    Presentation: Vertex  Position: Occiput Posterior     Operative Delivery    Operative Vaginal Delivery: No            Shoulder Dystocia    Shoulder Dystocia: No     Anesthesia    Method: None           Delivery    Head delivery date/time: 10/2/2022 01:13:49   Delivery date/time:  10/2/22 01:14:48   Delivery type: Normal spontaneous vaginal delivery    Details:     Delivery location: delivery room  Delivery Room Temperature: 72     Delivery Providers    Delivering Clinician: Cory Galvan, [address] [place] [address]   Delivery personnel:  Provider Role   Todd Calixto, RN Baby Nurse   Ashlyn Rahman, RN Delivery Nurse   Kaye Martini, RN Delivery Nurse         Cord    Vessels: 3 Vessels  Complications: None  Timed cord clamping: Yes  Time in sec: 180  Cord blood disposition: to lab  Gases sent?: Yes     Resuscitation    Method: None     [place] Measurements    Weight: 3640 g 8 lb 0.4 oz Length: 50.8 cm   Head circum.: 34.5 cm          Placenta    Date/time: 10/2/2022 0136  Removal: Spontaneous  Appearance: Abnormal  Disposition: Pathology     Apgars    Living status: Living   Apgar Scoring Key:    0 1 2    Skin color Blue or pale Acrocyanotic Completely pink    Heart rate Absent <100 bpm >100 bpm    Reflex irritability No response Grimace Cry or active withdrawal    Muscle tone Limp Some flexion Active motion    Respiratory effort Absent Weak cry; hypoventilation Good, crying              1 Minute:  5 Minute:  10 Minute:  15 Minute:  20 Minute:    Skin color: 0  0       Heart rate: 2  2       Reflex irritablity: 2  2       Muscle tone: 2  2       Respiratory effort: 2  2       Total: 8  8          Apgars assigned by: CARMEN KRISHNAN   disposition: with mother     Skin to Skin    Skin to skin initiated date/time: 10/2/2022 0114  Skin to skin with: Mother     Vaginal Count    No data filed     Delivery (Maternal)    Episiotomy: None  Perineal lacerations: None    Labial laceration: right Repaired?: No   Vaginal laceration?: No    Cervical laceration?: No    Clitoral laceration?: No              [place] progressed to complete dilatation and +1 station prior to pushing in hands and knees successfully to viable baby girl. See Delivery Summary above for time, APGARs, and weight. Fetal head presented in the OA position. Sweep for nuchal cord was performed and no nuchal cord identified. Posterior shoulder delivered spontaneously without traction. Baby vigorous at birth. To maternal abdomen for dry and stimulation then cord cut after pulsing ceased. Cord gas segment obtained. Prophylactic IV pitocin initiated in 3rd stage. Spontaneous placenta by kay mechanism, complete with 3 vessel cord. Placenta came out with blood and clots and appeared abnormal. Trailing membranes noted so ring forceps used to attempt to remove trailing membranes. CNM unable to remove the trailing membranes and pt with a few episodes of gushing with clots so Dr. Navid Adamson called to bedside. Dr. Navid Adamson was able to remove the trailing membranes and hemostasis achieved by fundal massage, prophylactic IV Pitocin at increased rate, methergine and cytotec. Vagina and perineum inspected and pt with hemostatic right labial laceration not requiring repair. Mother and infant appeared in stable condition when midwife left the delivery room.  Starting hemoglobin 12.0 and vital signs WNL when midwife left the delivery room. Sharps and 4x4 counts were correct. Breastfeeding initiated. CBC ordered to be done 4 hours after delivery and pt will be held in L&D until CBC is completed to more closely monitor bleeding. Dr. Narda Hollis in agreement with this plan.     Quantitative Blood Loss (mL)  1325

## 2022-10-02 NOTE — PROGRESS NOTES
Ob consult:    Called to nurse by nurse/cnm due to small membrane extending outside introitus,  Placenta delivered spontaneously with large clots per cnm. Placenta intact appearance in placental basin. Anterior vaginal retractor placed,  Placental membrane fragment grasped with ring forceps, and with a second ring forceps and gentle traction, membrane fragment evacuated. Pelvic exam evacuated clots from cervix, fundal massage and pitocin/methergine given and uterus noted to be firm and with good tonus and good hemostasis now. .  cytotec placed by cnm. Perineum intact and normal uterine tonus/fundal height after observation. Pt counseled and all questions answered.

## 2022-10-02 NOTE — PROGRESS NOTES
Patient up to bathroom with assist x 2. Voided 400. Patient transferred to mother/baby room 355 per wheelchair in stable condition with baby and personal belongings. Accompanied by significant other and staff. Report given to [facility].

## 2022-10-02 NOTE — PLAN OF CARE
Problem: Patient Centered Care  Goal: Patient preferences are identified and integrated in the patient's plan of care  Description: Interventions:  - What would you like us to know as we care for you?  Baby Girl  - Provide timely, complete, and accurate information to patient/family  - Incorporate patient and family knowledge, values, beliefs, and cultural backgrounds into the planning and delivery of care  - Encourage patient/family to participate in care and decision-making at the level they choose  - Honor patient and family perspectives and choices  Outcome: Progressing     Problem: Patient/Family Goals  Goal: Patient/Family Long Term Goal  Description: Patient's Long Term Goal: Uncomplicated Delivery     Interventions:  - Assessment/Monitoring  - Induction/Augmentation per protocol and Provider order  - C/S per protocol and Provider order   - Education  - Intervention per protocol and Provider order with education   - Involve patient in POC  - See additional Care Plan goals for specific interventions      Outcome: Progressing  Goal: Patient/Family Short Term Goal  Description: Patient's Short Term Goal: Comfort and Pain Control     Interventions:   - Non Pharmacological pain intervention   - IV/IM and Epidural pain medication per Provider order and patient request  - Education  - Involve Patient in POC   - See additional Care Plan goals for specific interventions  Outcome: Progressing      Problem: BIRTH - VAGINAL/ SECTION  Goal: Fetal and maternal status remain reassuring during the birth process  Description: INTERVENTIONS:  - Monitor vital signs  - Monitor fetal heart rate  - Monitor uterine activity  - Monitor labor progression (vaginal delivery)  - DVT prophylaxis (C/S delivery)  - Surgical antibiotic prophylaxis (C/S delivery)  Outcome: Progressing     Problem: PAIN - ADULT  Goal: Verbalizes/displays adequate comfort level or patient's stated pain goal  Description: INTERVENTIONS:  - Encourage pt to monitor pain and request assistance  - Assess pain using appropriate pain scale  - Administer analgesics based on type and severity of pain and evaluate response  - Implement non-pharmacological measures as appropriate and evaluate response  - Consider cultural and social influences on pain and pain management  - Manage/alleviate anxiety  - Utilize distraction and/or relaxation techniques  - Monitor for opioid side effects  - Notify MD/LIP if interventions unsuccessful or patient reports new pain  - Anticipate increased pain with activity and pre-medicate as appropriate  Outcome: Progressing     Problem: ANXIETY  Goal: Will report anxiety at manageable levels  Description: INTERVENTIONS:  - Administer medication as ordered  - Teach and rehearse alternative coping skills  - Provide emotional support with 1:1 interaction with staff  Outcome: Progressing

## 2022-10-02 NOTE — PLAN OF CARE
Problem: Patient Centered Care  Goal: Patient preferences are identified and integrated in the patient's plan of care  Description: Interventions:  - What would you like us to know as we care for you?   - Provide timely, complete, and accurate information to patient/family  - Incorporate patient and family knowledge, values, beliefs, and cultural backgrounds into the planning and delivery of care  - Encourage patient/family to participate in care and decision-making at the level they choose  - Honor patient and family perspectives and choices  Outcome: Progressing     Problem: Patient/Family Goals  Goal: Patient/Family Long Term Goal  Description: Patient's Long Term Goal: Uncomplicated Delivery     Interventions:  - Assessment/Monitoring  - Induction/Augmentation per protocol and Provider order  - C/S per protocol and Provider order   - Education  - Intervention per protocol and Provider order with education   - Involve patient in POC  - See additional Care Plan goals for specific interventions      Outcome: Progressing  Goal: Patient/Family Short Term Goal  Description: Patient's Short Term Goal: Comfort and Pain Control     Interventions:   - Non Pharmacological pain intervention   - IV/IM and Epidural pain medication per Provider order and patient request  - Education  - Involve Patient in POC   - See additional Care Plan goals for specific interventions        Outcome: Progressing     Problem: BIRTH - VAGINAL/ SECTION  Goal: Fetal and maternal status remain reassuring during the birth process  Description: INTERVENTIONS:  - Monitor vital signs  - Monitor fetal heart rate  - Monitor uterine activity  - Monitor labor progression (vaginal delivery)  - DVT prophylaxis (C/S delivery)  - Surgical antibiotic prophylaxis (C/S delivery)  Outcome: Progressing     Problem: PAIN - ADULT  Goal: Verbalizes/displays adequate comfort level or patient's stated pain goal  Description: INTERVENTIONS:  - Encourage pt to monitor pain and request assistance  - Assess pain using appropriate pain scale  - Administer analgesics based on type and severity of pain and evaluate response  - Implement non-pharmacological measures as appropriate and evaluate response  - Consider cultural and social influences on pain and pain management  - Manage/alleviate anxiety  - Utilize distraction and/or relaxation techniques  - Monitor for opioid side effects  - Notify MD/LIP if interventions unsuccessful or patient reports new pain  - Anticipate increased pain with activity and pre-medicate as appropriate  Outcome: Progressing     Problem: ANXIETY  Goal: Will report anxiety at manageable levels  Description: INTERVENTIONS:  - Administer medication as ordered  - Teach and rehearse alternative coping skills  - Provide emotional support with 1:1 interaction with staff  Outcome: Progressing

## 2022-10-02 NOTE — PLAN OF CARE
Problem: Patient Centered Care  Goal: Patient preferences are identified and integrated in the patient's plan of care  Description: Interventions:  - What would you like us to know as we care for you?  *having baby girl;  - Provide timely, complete, and accurate information to patient/family  - Incorporate patient and family knowledge, values, beliefs, and cultural backgrounds into the planning and delivery of care  - Encourage patient/family to participate in care and decision-making at the level they choose  - Honor patient and family perspectives and choices  Outcome: Progressing     Problem: Patient/Family Goals  Goal: Patient/Family Long Term Goal  Description: Patient's Long Term Goal: Go home with healthy baby    Interventions:  - labor care  - See additional Care Plan goals for specific interventions  Outcome: Progressing  Goal: Patient/Family Short Term Goal  Description: Patient's Short Term Goal: safe delivery    Interventions:   - labor care  - See additional Care Plan goals for specific interventions  Outcome: Progressing     Problem: BIRTH - VAGINAL/ SECTION  Goal: Fetal and maternal status remain reassuring during the birth process  Description: INTERVENTIONS:  - Monitor vital signs  - Monitor fetal heart rate  - Monitor uterine activity  - Monitor labor progression (vaginal delivery)  - DVT prophylaxis (C/S delivery)  - Surgical antibiotic prophylaxis (C/S delivery)  Outcome: Progressing     Problem: PAIN - ADULT  Goal: Verbalizes/displays adequate comfort level or patient's stated pain goal  Description: INTERVENTIONS:  - Encourage pt to monitor pain and request assistance  - Assess pain using appropriate pain scale  - Administer analgesics based on type and severity of pain and evaluate response  - Implement non-pharmacological measures as appropriate and evaluate response  - Consider cultural and social influences on pain and pain management  - Manage/alleviate anxiety  - Utilize distraction and/or relaxation techniques  - Monitor for opioid side effects  - Notify MD/LIP if interventions unsuccessful or patient reports new pain  - Anticipate increased pain with activity and pre-medicate as appropriate  Outcome: Progressing     Problem: ANXIETY  Goal: Will report anxiety at manageable levels  Description: INTERVENTIONS:  - Administer medication as ordered  - Teach and rehearse alternative coping skills  - Provide emotional support with 1:1 interaction with staff  Outcome: Progressing

## 2022-10-03 VITALS
SYSTOLIC BLOOD PRESSURE: 101 MMHG | HEART RATE: 63 BPM | BODY MASS INDEX: 27.31 KG/M2 | HEIGHT: 67 IN | OXYGEN SATURATION: 100 % | DIASTOLIC BLOOD PRESSURE: 50 MMHG | WEIGHT: 174 LBS | RESPIRATION RATE: 16 BRPM | TEMPERATURE: 98 F

## 2022-10-03 LAB
BASOPHILS # BLD AUTO: 0.04 X10(3) UL (ref 0–0.2)
BASOPHILS NFR BLD AUTO: 0.3 %
DEPRECATED RDW RBC AUTO: 44.2 FL (ref 35.1–46.3)
DEPRECATED RDW RBC AUTO: 44.7 FL (ref 35.1–46.3)
EOSINOPHIL # BLD AUTO: 0.24 X10(3) UL (ref 0–0.7)
EOSINOPHIL NFR BLD AUTO: 2.1 %
ERYTHROCYTE [DISTWIDTH] IN BLOOD BY AUTOMATED COUNT: 13.2 % (ref 11–15)
ERYTHROCYTE [DISTWIDTH] IN BLOOD BY AUTOMATED COUNT: 13.2 % (ref 11–15)
HCT VFR BLD AUTO: 25.4 %
HCT VFR BLD AUTO: 25.7 %
HGB BLD-MCNC: 8.4 G/DL
HGB BLD-MCNC: 8.5 G/DL
IMM GRANULOCYTES # BLD AUTO: 0.11 X10(3) UL (ref 0–1)
IMM GRANULOCYTES NFR BLD: 1 %
LYMPHOCYTES # BLD AUTO: 2.23 X10(3) UL (ref 1–4)
LYMPHOCYTES NFR BLD AUTO: 19.4 %
MCH RBC QN AUTO: 30.5 PG (ref 26–34)
MCH RBC QN AUTO: 31 PG (ref 26–34)
MCHC RBC AUTO-ENTMCNC: 32.7 G/DL (ref 31–37)
MCHC RBC AUTO-ENTMCNC: 33.5 G/DL (ref 31–37)
MCV RBC AUTO: 92.7 FL
MCV RBC AUTO: 93.5 FL
MONOCYTES # BLD AUTO: 0.82 X10(3) UL (ref 0.1–1)
MONOCYTES NFR BLD AUTO: 7.1 %
NEUTROPHILS # BLD AUTO: 8.07 X10 (3) UL (ref 1.5–7.7)
NEUTROPHILS # BLD AUTO: 8.07 X10(3) UL (ref 1.5–7.7)
NEUTROPHILS NFR BLD AUTO: 70.1 %
PLATELET # BLD AUTO: 185 10(3)UL (ref 150–450)
PLATELET # BLD AUTO: 186 10(3)UL (ref 150–450)
RBC # BLD AUTO: 2.74 X10(6)UL
RBC # BLD AUTO: 2.75 X10(6)UL
WBC # BLD AUTO: 11.5 X10(3) UL (ref 4–11)
WBC # BLD AUTO: 12 X10(3) UL (ref 4–11)

## 2022-10-03 RX ORDER — IBUPROFEN 600 MG/1
600 TABLET ORAL EVERY 6 HOURS PRN
Qty: 30 TABLET | Refills: 0 | Status: SHIPPED | OUTPATIENT
Start: 2022-10-03

## 2022-10-03 RX ORDER — PSEUDOEPHEDRINE HCL 30 MG
100 TABLET ORAL 2 TIMES DAILY PRN
Qty: 30 CAPSULE | Refills: 0 | Status: SHIPPED | OUTPATIENT
Start: 2022-10-03

## 2022-10-03 NOTE — DISCHARGE SUMMARY
[facility]    Discharge Summary    Yuliet Camacho Patient Status:  Inpatient    3/10/1995 MRN [id number]   Location [facility] 3SE Attending Alex Saavedra, [address] Day # 2       Delivering OB Clinician: Alex Saavedra    [facility]: Estimated Date of Delivery: 22    Gestational Age: 41w4d    Antepartum complications: Patient Active Problem List:     Hypothyroidism     COVID-19 affecting pregnancy in second trimester     History of delivery by vacuum extraction, currently pregnant     Post term pregnancy      (normal spontaneous vaginal delivery)     Third-stage postpartum hemorrhage      Date of Delivery: 10/2/2022 Time of Delivery: 1:14 AM    Delivery Type: spontaneous vaginal delivery    Baby: Liveborn female Information for the patient's : Killian Chatterjee, Girl [[id number]]   8 lb 0.4 oz (3.64 kg)  Apgars:  1 minute: 8  5 minutes: 810 minutes:       Intrapartum Complications: Postpartum Hemorrhage    Admit Date: 10/1/2022    Discharge Date: 10/3/2022    Hospital Course: No complications.  Routine delivery and postpartum care    Discharged Condition: stable    Disposition: home    Plan:     Follow-up appointment in 2 weeks with Sandy Aviles CNM  10/3/2022  2:13 PM

## 2022-10-03 NOTE — PLAN OF CARE
Problem: Patient Centered Care  Goal: Patient preferences are identified and integrated in the patient's plan of care  Description: Interventions:  - What would you like us to know as we care for you?   - Provide timely, complete, and accurate information to patient/family  - Incorporate patient and family knowledge, values, beliefs, and cultural backgrounds into the planning and delivery of care  - Encourage patient/family to participate in care and decision-making at the level they choose  - Honor patient and family perspectives and choices  Outcome: Progressing     Problem: Patient/Family Goals  Goal: Patient/Family Long Term Goal  Description: Patient's Long Term Goal: Uncomplicated Delivery     Interventions:  - Assessment/Monitoring  - Induction/Augmentation per protocol and Provider order  - C/S per protocol and Provider order   - Education  - Intervention per protocol and Provider order with education   - Involve patient in POC  - See additional Care Plan goals for specific interventions      Outcome: Progressing  Goal: Patient/Family Short Term Goal  Description: Patient's Short Term Goal: Comfort and Pain Control     Interventions:   - Non Pharmacological pain intervention   - IV/IM and Epidural pain medication per Provider order and patient request  - Education  - Involve Patient in POC   - See additional Care Plan goals for specific interventions        Outcome: Progressing     Problem: BIRTH - VAGINAL/ SECTION  Goal: Fetal and maternal status remain reassuring during the birth process  Description: INTERVENTIONS:  - Monitor vital signs  - Monitor fetal heart rate  - Monitor uterine activity  - Monitor labor progression (vaginal delivery)  - DVT prophylaxis (C/S delivery)  - Surgical antibiotic prophylaxis (C/S delivery)  Outcome: Progressing     Problem: PAIN - ADULT  Goal: Verbalizes/displays adequate comfort level or patient's stated pain goal  Description: INTERVENTIONS:  - Encourage pt to monitor pain and request assistance  - Assess pain using appropriate pain scale  - Administer analgesics based on type and severity of pain and evaluate response  - Implement non-pharmacological measures as appropriate and evaluate response  - Consider cultural and social influences on pain and pain management  - Manage/alleviate anxiety  - Utilize distraction and/or relaxation techniques  - Monitor for opioid side effects  - Notify MD/LIP if interventions unsuccessful or patient reports new pain  - Anticipate increased pain with activity and pre-medicate as appropriate  Outcome: Progressing     Problem: ANXIETY  Goal: Will report anxiety at manageable levels  Description: INTERVENTIONS:  - Administer medication as ordered  - Teach and rehearse alternative coping skills  - Provide emotional support with 1:1 interaction with staff  Outcome: Progressing     Problem: POSTPARTUM  Goal: Long Term Goal:Experiences normal postpartum course  Description: INTERVENTIONS:  - Assess and monitor vital signs and lab values. - Assess fundus and lochia. - Provide ice/sitz baths for perineum discomfort. - Monitor healing of incision/episiotomy/laceration, and assess for signs and symptoms of infection and hematoma. - Assess bladder function and monitor for bladder distention.  - Provide/instruct/assist with pericare as needed. - Provide VTE prophylaxis as needed. - Monitor bowel function.  - Encourage ambulation and provide assistance as needed. - Assess and monitor emotional status and provide social service/psych resources as needed. - Utilize standard precautions and use personal protective equipment as indicated. Ensure aseptic care of all intravenous lines and invasive tubes/drains.  - Obtain immunization and exposure to communicable diseases history. Outcome: Progressing  Goal: Optimize infant feeding at the breast  Description: INTERVENTIONS:  - Initiate breast feeding within first hour after birth.    - Monitor effectiveness of current breast feeding efforts. - Assess support systems available to mother/family.  - Identify cultural beliefs/practices regarding lactation, letdown techniques, maternal food preferences. - Assess mother's knowledge and previous experience with breast feeding.  - Provide information as needed about early infant feeding cues (e.g., rooting, lip smacking, sucking fingers/hand) versus late cue of crying.  - Discuss/demonstrate breast feeding aids (e.g., infant sling, nursing footstool/pillows, and breast pumps). - Encourage mother/other family members to express feelings/concerns, and actively listen. - Educate father/SO about benefits of breast feeding and how to manage common lactation challenges. - Recommend avoidance of specific medications or substances incompatible with breast feeding.  - Assess and monitor for signs of nipple pain/trauma. - Instruct and provide assistance with proper latch. - Review techniques for milk expression (breast pumping) and storage of breast milk. Provide pumping equipment/supplies, instructions and assistance, as needed. - Encourage rooming-in and breast feeding on demand.  - Encourage skin-to-skin contact. - Provide LC support as needed. - Assess for and manage engorgement. - Provide breast feeding education handouts and information on community breast feeding support. Outcome: Progressing  Goal: Establishment of adequate milk supply with medication/procedure interruptions  Description: INTERVENTIONS:  - Review techniques for milk expression (breast pumping). - Provide pumping equipment/supplies, instructions, and assistance until it is safe to breastfeed infant. Outcome: Progressing  Goal: Experiences normal breast weaning course  Description: INTERVENTIONS:  - Assess for and manage engorgement. - Instruct on breast care. - Provide comfort measures.   Outcome: Progressing  Goal: Appropriate maternal -  bonding  Description: INTERVENTIONS:  - Assess caregiver- interactions. - Assess caregiver's emotional status and coping mechanisms. - Encourage caregiver to participate in  daily care. - Assess support systems available to mother/family.  - Provide /case management support as needed.   Outcome: Progressing

## 2022-10-09 ENCOUNTER — TELEPHONE (OUTPATIENT)
Dept: OBGYN CLINIC | Facility: CLINIC | Age: 27
End: 2022-10-09

## 2022-10-09 NOTE — TELEPHONE ENCOUNTER
10/8/22: Page from answering service connected to patient. Pt is 7 days PP and had an uncomplicated vaginal delivery  Placenta had trailing membranes tat were \"teased\" out by Dr Anthony Rosenbaum. PP course was uncomplicated and pt was discharged home  Pt reports that today she has noticed a foul odor to her lochia. Denies any increase in bleeding, pain , uterine tenderness or fevers. Breastfeeding successfully   Discussed with patient option of ER for evaluation or waiting until Monday to see a CNM. Pt desires to wait. Pt understands to call if fever, pain, overall malaise or increased bleeding. Pt states understanding and compliance  Will schedule Monday am appt with CNM.

## 2022-10-10 ENCOUNTER — OFFICE VISIT (OUTPATIENT)
Dept: OBGYN CLINIC | Facility: CLINIC | Age: 27
End: 2022-10-10
Payer: COMMERCIAL

## 2022-10-10 VITALS
HEIGHT: 65 IN | BODY MASS INDEX: 25.83 KG/M2 | HEART RATE: 99 BPM | SYSTOLIC BLOOD PRESSURE: 99 MMHG | DIASTOLIC BLOOD PRESSURE: 66 MMHG | WEIGHT: 155 LBS

## 2022-10-10 DIAGNOSIS — N89.8 VAGINAL DISCHARGE: ICD-10-CM

## 2022-10-10 DIAGNOSIS — N71.9: Primary | ICD-10-CM

## 2022-10-10 LAB — TRICHOMONAS SCREEN: NEGATIVE

## 2022-10-10 PROCEDURE — 87808 TRICHOMONAS ASSAY W/OPTIC: CPT | Performed by: ADVANCED PRACTICE MIDWIFE

## 2022-10-10 PROCEDURE — 87205 SMEAR GRAM STAIN: CPT | Performed by: ADVANCED PRACTICE MIDWIFE

## 2022-10-10 PROCEDURE — 87106 FUNGI IDENTIFICATION YEAST: CPT | Performed by: ADVANCED PRACTICE MIDWIFE

## 2022-10-10 RX ORDER — METRONIDAZOLE 500 MG/1
500 TABLET ORAL 2 TIMES DAILY
Qty: 28 TABLET | Refills: 0 | Status: SHIPPED | OUTPATIENT
Start: 2022-10-10 | End: 2022-10-24

## 2022-10-10 RX ORDER — DOXYCYCLINE HYCLATE 100 MG
100 TABLET ORAL 2 TIMES DAILY
Qty: 28 TABLET | Refills: 0 | Status: SHIPPED | OUTPATIENT
Start: 2022-10-10 | End: 2022-10-24

## 2022-10-10 RX ORDER — FLUCONAZOLE 150 MG/1
150 TABLET ORAL WEEKLY
Qty: 3 TABLET | Refills: 0 | Status: SHIPPED | OUTPATIENT
Start: 2022-10-10

## 2022-10-10 RX ORDER — CEFTRIAXONE 500 MG/1
500 INJECTION, POWDER, FOR SOLUTION INTRAMUSCULAR; INTRAVENOUS ONCE
Status: COMPLETED | OUTPATIENT
Start: 2022-10-10 | End: 2022-10-10

## 2022-10-10 RX ADMIN — CEFTRIAXONE 500 MG: 500 INJECTION, POWDER, FOR SOLUTION INTRAMUSCULAR; INTRAVENOUS at 10:13:00

## 2022-10-10 NOTE — PROGRESS NOTES
Manjula Bergman is a 25yo CF  now at 8 days PP s/p  on 10/2/22. She had trailing membranes with PPH. She does not believe she was given antibiotics after the procedures. She describes a brownish malodorous vaginal discharge and cramping for the past couple of days all though the discharge is getting lighter but still malodrous. She has been using Ibuprofen which helps but it keeps returning, mostly with breastfeeding. She has also used GasX which has helped a little but not resolved. Objective:   10/10/22  0934   BP: 99/66   Pulse: 99     Physical Exam  Vitals reviewed. Constitutional:       Appearance: Normal appearance. HENT:      Head: Normocephalic. Genitourinary:     Exam position: Lithotomy position. Labia:         Right: No rash or tenderness. Left: No rash or tenderness. Vagina: Vaginal discharge present. No bleeding. Cervix: Normal.      Uterus: Enlarged and tender. Adnexa: Right adnexa normal and left adnexa normal.      Comments: Yellow creamy malodorous discharge noted in os and vagina. Mild CMT and moderate Uterine tenderness on bimanual.  Tears welled up on exam.  Skin:     General: Skin is warm. Neurological:      Mental Status: She is alert and oriented to person, place, and time. Psychiatric:         Behavior: Behavior normal.       Assessment:  1. Infection of uterus-     Orders Placed This Encounter      Genital vaginosis screen      Orders Placed This Encounter      cefTRIAXone (Rocephin) injection 500 mg      Doxycycline Hyclate 100 MG Oral Tab      metRONIDAZOLE 500 MG Oral Tab      fluconazole 150 MG Oral Tab      Return for 2 wk PP telehealth as already scheduled. Reviewed with pt possible uterine infection r/t trailing membranes and hemorrhage. Encouraged her to take Ibuprofen 600mg q 6 and then antibiotcs BID x 14 days. She should be feeling better within a couple of days.   To keep her 2 wk telehealth appointment and then 6 wk in clinic appointment. May call PRN    Pt verbalizes understanding of all instructions given.     Inga Hemphill CNM

## 2022-10-10 NOTE — TELEPHONE ENCOUNTER
Having bad cramping pain, started 20-30 minutes ago. Uncertain if is gas or what. Took Ibuprofen 600 mg 20 minutes. Is still having foul smelling discharge. Bleeding has slowed down, brownish. Tiny clots. No fevers or chills. Breastfeeding. Advised to jd warm pack, give ibuprofen another 30 mintues to kick in. Could try a dose of simethicone as well. Did breastfeed right before cramping started. If pain remains severe despite Ibuprofen recommend come in to ER for eval. If gets better, to take another dose of Ibuprofen in 6 hours and plan CNM visit in office in AM. Agrees with plan.

## 2022-10-11 ENCOUNTER — TELEPHONE (OUTPATIENT)
Dept: OBGYN CLINIC | Facility: CLINIC | Age: 27
End: 2022-10-11

## 2022-10-11 NOTE — TELEPHONE ENCOUNTER
Pt states she is feeling much better already. Advised of PF instructions. Pt desires to schedule ultrasound on her schedule. Order changed to stat. Call transferred to  for pt to schedule. Advised pt that if she has any difficulty, she is to contact office for further assistance.  Pt verbalized an understanding & agrees w/ plan

## 2022-10-12 ENCOUNTER — HOSPITAL ENCOUNTER (OUTPATIENT)
Dept: ULTRASOUND IMAGING | Facility: HOSPITAL | Age: 27
Discharge: HOME | End: 2022-10-12
Attending: ADVANCED PRACTICE MIDWIFE
Payer: COMMERCIAL

## 2022-10-12 DIAGNOSIS — N71.9: ICD-10-CM

## 2022-10-12 DIAGNOSIS — N89.8 VAGINAL DISCHARGE: ICD-10-CM

## 2022-10-12 LAB
GENITAL VAGINOSIS SCREEN: NEGATIVE
TRICHOMONAS SCREEN: NEGATIVE

## 2022-10-12 PROCEDURE — 76856 US EXAM PELVIC COMPLETE: CPT | Performed by: ADVANCED PRACTICE MIDWIFE

## 2022-10-13 ENCOUNTER — TELEPHONE (OUTPATIENT)
Dept: OBGYN CLINIC | Facility: CLINIC | Age: 27
End: 2022-10-13

## 2022-10-13 NOTE — TELEPHONE ENCOUNTER
Called pt and confirmed name and BD. She states she is feeling much better. Still taking the abx. Reviewed Pelvic US results and that \"endometritis not definitely excluded. \"  Encouraged to finish abx  Pt verbalized understanding of all instructions give.   Patrice Toledo CNM

## 2022-10-18 ENCOUNTER — TELEMEDICINE (OUTPATIENT)
Dept: OBGYN CLINIC | Facility: CLINIC | Age: 27
End: 2022-10-18

## 2022-10-28 ENCOUNTER — TELEPHONE (OUTPATIENT)
Dept: OBGYN UNIT | Facility: HOSPITAL | Age: 27
End: 2022-10-28

## 2022-10-30 DIAGNOSIS — F41.1 GAD (GENERALIZED ANXIETY DISORDER): ICD-10-CM

## 2022-10-30 DIAGNOSIS — E55.9 VITAMIN D DEFICIENCY: ICD-10-CM

## 2022-10-30 DIAGNOSIS — R53.83 OTHER FATIGUE: ICD-10-CM

## 2022-10-30 DIAGNOSIS — N92.6 IRREGULAR PERIODS: ICD-10-CM

## 2022-10-30 DIAGNOSIS — E03.9 HYPOTHYROIDISM (ACQUIRED): ICD-10-CM

## 2022-11-01 DIAGNOSIS — N92.6 IRREGULAR PERIODS: ICD-10-CM

## 2022-11-01 DIAGNOSIS — E03.9 HYPOTHYROIDISM (ACQUIRED): ICD-10-CM

## 2022-11-01 DIAGNOSIS — E55.9 VITAMIN D DEFICIENCY: ICD-10-CM

## 2022-11-01 DIAGNOSIS — R53.83 OTHER FATIGUE: ICD-10-CM

## 2022-11-01 DIAGNOSIS — F41.1 GAD (GENERALIZED ANXIETY DISORDER): ICD-10-CM

## 2022-11-01 RX ORDER — LEVOTHYROXINE SODIUM 0.05 MG/1
TABLET ORAL
Qty: 90 TABLET | Refills: 1 | Status: SHIPPED | OUTPATIENT
Start: 2022-11-01

## 2022-11-01 RX ORDER — LEVOTHYROXINE SODIUM 0.05 MG/1
50 TABLET ORAL
Qty: 90 TABLET | Refills: 1 | Status: SHIPPED | OUTPATIENT
Start: 2022-11-01

## 2022-11-14 ENCOUNTER — POSTPARTUM (OUTPATIENT)
Dept: OBGYN CLINIC | Facility: CLINIC | Age: 27
End: 2022-11-14
Payer: COMMERCIAL

## 2022-11-14 VITALS
SYSTOLIC BLOOD PRESSURE: 91 MMHG | DIASTOLIC BLOOD PRESSURE: 64 MMHG | HEIGHT: 65 IN | BODY MASS INDEX: 25.16 KG/M2 | WEIGHT: 151 LBS | HEART RATE: 83 BPM

## 2022-11-14 PROCEDURE — 3078F DIAST BP <80 MM HG: CPT | Performed by: ADVANCED PRACTICE MIDWIFE

## 2022-11-14 PROCEDURE — 3074F SYST BP LT 130 MM HG: CPT | Performed by: ADVANCED PRACTICE MIDWIFE

## 2022-11-14 PROCEDURE — 3008F BODY MASS INDEX DOCD: CPT | Performed by: ADVANCED PRACTICE MIDWIFE

## 2022-11-14 NOTE — PROGRESS NOTES
Patient here for postpartum check-up. Vaginal delivery @ 6 weeks ago with CNM MB - complicated by 3rd stage PPH with trailing membranes. Breastfeeding exclusively, on demand. Baby with adequate weight gain. Denies fevers, chills, body aches and flu-like symptoms. Denies abdominal pain, no pelvic pain, reports scant rare vaginal bleeding. Denies dysuria, urinary frequency and urinary incontinence. Denies constipation, painful bowel movements and fecal incontinence. Denies symptoms of postpartum depression including mood, affect, appetite / activity level changes. Has adequate support at home. Perineum concerns: no pain  Depression / GADscreen: WNL  Considering NFP. for BC method    O:    11/14/22  0958   BP: 91/64   Pulse: 83       General: well groomed, Alert and oriented x 3    Lungs: normal effort  Breasts: bilaterally lactating, no masses, no skin redness, nipples intact with no trauma    Abdomen: non-tender, no masses, no hernia  : perineum intact, introitus normal, vaginal walls pink, physiologic discharge; cervix intact no lesions,  uterus non-tender, normal size, no adnexal masses or tenderness; neg CMT  Psych: pleasant, normal affect      A: Normal PP involution      Breastfeeding   P: Continue to breastfeed exclusively, continue PNV while breastfeeding and for preconception.        DAMON / Condoms for BC method  Follow-up with routine annual exam in the next year

## 2022-11-21 ENCOUNTER — OFFICE VISIT (OUTPATIENT)
Dept: INTERNAL MEDICINE CLINIC | Facility: CLINIC | Age: 27
End: 2022-11-21
Payer: COMMERCIAL

## 2022-11-21 VITALS
HEIGHT: 65 IN | DIASTOLIC BLOOD PRESSURE: 82 MMHG | WEIGHT: 150 LBS | SYSTOLIC BLOOD PRESSURE: 120 MMHG | OXYGEN SATURATION: 98 % | BODY MASS INDEX: 24.99 KG/M2 | HEART RATE: 80 BPM

## 2022-11-21 DIAGNOSIS — E03.9 HYPOTHYROIDISM, UNSPECIFIED TYPE: ICD-10-CM

## 2022-11-21 DIAGNOSIS — L98.9 SCALP LESION: ICD-10-CM

## 2022-11-21 DIAGNOSIS — Z82.5 FAMILY HISTORY OF ASTHMA: ICD-10-CM

## 2022-11-21 DIAGNOSIS — Z00.00 ANNUAL PHYSICAL EXAM: Primary | ICD-10-CM

## 2022-11-21 DIAGNOSIS — D64.9 ANEMIA, UNSPECIFIED TYPE: ICD-10-CM

## 2022-11-21 DIAGNOSIS — R06.02 SOB (SHORTNESS OF BREATH): ICD-10-CM

## 2022-11-21 LAB
ALBUMIN SERPL-MCNC: 4.7 G/DL (ref 3.4–5)
ALBUMIN/GLOB SERPL: 1.5 {RATIO} (ref 1–2)
ALP LIVER SERPL-CCNC: 57 U/L
ALT SERPL-CCNC: 34 U/L
ANION GAP SERPL CALC-SCNC: 5 MMOL/L (ref 0–18)
AST SERPL-CCNC: 12 U/L (ref 15–37)
BASOPHILS # BLD AUTO: 0.04 X10(3) UL (ref 0–0.2)
BASOPHILS NFR BLD AUTO: 0.7 %
BILIRUB SERPL-MCNC: 0.3 MG/DL (ref 0.1–2)
BUN BLD-MCNC: 14 MG/DL (ref 7–18)
BUN/CREAT SERPL: 16.9 (ref 10–20)
CALCIUM BLD-MCNC: 9.4 MG/DL (ref 8.5–10.1)
CHLORIDE SERPL-SCNC: 105 MMOL/L (ref 98–112)
CHOLEST SERPL-MCNC: 254 MG/DL (ref ?–200)
CO2 SERPL-SCNC: 30 MMOL/L (ref 21–32)
CREAT BLD-MCNC: 0.83 MG/DL
DEPRECATED RDW RBC AUTO: 38.1 FL (ref 35.1–46.3)
EOSINOPHIL # BLD AUTO: 0.18 X10(3) UL (ref 0–0.7)
EOSINOPHIL NFR BLD AUTO: 3.3 %
ERYTHROCYTE [DISTWIDTH] IN BLOOD BY AUTOMATED COUNT: 11.7 % (ref 11–15)
FASTING PATIENT LIPID ANSWER: YES
FASTING STATUS PATIENT QL REPORTED: YES
GFR SERPLBLD BASED ON 1.73 SQ M-ARVRAT: 99 ML/MIN/1.73M2 (ref 60–?)
GLOBULIN PLAS-MCNC: 3.1 G/DL (ref 2.8–4.4)
GLUCOSE BLD-MCNC: 61 MG/DL (ref 70–99)
HCT VFR BLD AUTO: 42.3 %
HDLC SERPL-MCNC: 76 MG/DL (ref 40–59)
HGB BLD-MCNC: 13.7 G/DL
IMM GRANULOCYTES # BLD AUTO: 0.02 X10(3) UL (ref 0–1)
IMM GRANULOCYTES NFR BLD: 0.4 %
LDLC SERPL CALC-MCNC: 162 MG/DL (ref ?–100)
LYMPHOCYTES # BLD AUTO: 1.99 X10(3) UL (ref 1–4)
LYMPHOCYTES NFR BLD AUTO: 36.9 %
MCH RBC QN AUTO: 29 PG (ref 26–34)
MCHC RBC AUTO-ENTMCNC: 32.4 G/DL (ref 31–37)
MCV RBC AUTO: 89.4 FL
MONOCYTES # BLD AUTO: 0.3 X10(3) UL (ref 0.1–1)
MONOCYTES NFR BLD AUTO: 5.6 %
NEUTROPHILS # BLD AUTO: 2.87 X10 (3) UL (ref 1.5–7.7)
NEUTROPHILS # BLD AUTO: 2.87 X10(3) UL (ref 1.5–7.7)
NEUTROPHILS NFR BLD AUTO: 53.1 %
NONHDLC SERPL-MCNC: 178 MG/DL (ref ?–130)
OSMOLALITY SERPL CALC.SUM OF ELEC: 288 MOSM/KG (ref 275–295)
PLATELET # BLD AUTO: 279 10(3)UL (ref 150–450)
POTASSIUM SERPL-SCNC: 4.3 MMOL/L (ref 3.5–5.1)
PROT SERPL-MCNC: 7.8 G/DL (ref 6.4–8.2)
RBC # BLD AUTO: 4.73 X10(6)UL
SODIUM SERPL-SCNC: 140 MMOL/L (ref 136–145)
TRIGL SERPL-MCNC: 94 MG/DL (ref 30–149)
TSI SER-ACNC: 1.16 MIU/ML (ref 0.36–3.74)
VLDLC SERPL CALC-MCNC: 18 MG/DL (ref 0–30)
WBC # BLD AUTO: 5.4 X10(3) UL (ref 4–11)

## 2022-11-21 PROCEDURE — 3074F SYST BP LT 130 MM HG: CPT | Performed by: INTERNAL MEDICINE

## 2022-11-21 PROCEDURE — 84443 ASSAY THYROID STIM HORMONE: CPT | Performed by: INTERNAL MEDICINE

## 2022-11-21 PROCEDURE — 90686 IIV4 VACC NO PRSV 0.5 ML IM: CPT | Performed by: INTERNAL MEDICINE

## 2022-11-21 PROCEDURE — 80061 LIPID PANEL: CPT | Performed by: INTERNAL MEDICINE

## 2022-11-21 PROCEDURE — 85025 COMPLETE CBC W/AUTO DIFF WBC: CPT | Performed by: INTERNAL MEDICINE

## 2022-11-21 PROCEDURE — 99395 PREV VISIT EST AGE 18-39: CPT | Performed by: INTERNAL MEDICINE

## 2022-11-21 PROCEDURE — 3008F BODY MASS INDEX DOCD: CPT | Performed by: INTERNAL MEDICINE

## 2022-11-21 PROCEDURE — 90471 IMMUNIZATION ADMIN: CPT | Performed by: INTERNAL MEDICINE

## 2022-11-21 PROCEDURE — 80053 COMPREHEN METABOLIC PANEL: CPT | Performed by: INTERNAL MEDICINE

## 2022-11-21 PROCEDURE — 3079F DIAST BP 80-89 MM HG: CPT | Performed by: INTERNAL MEDICINE

## 2022-11-21 RX ORDER — ALBUTEROL SULFATE 90 UG/1
2 AEROSOL, METERED RESPIRATORY (INHALATION) EVERY 6 HOURS PRN
Qty: 18 G | Refills: 0 | Status: SHIPPED | OUTPATIENT
Start: 2022-11-21

## 2022-11-21 NOTE — PATIENT INSTRUCTIONS
Take sertraline half tab x 1 week   Then half tablet every other day x 1 week   Then half tablet every 2 days x 1 week then stop

## 2022-11-22 DIAGNOSIS — E78.5 HYPERLIPIDEMIA, UNSPECIFIED HYPERLIPIDEMIA TYPE: Primary | ICD-10-CM

## 2023-02-04 RX ORDER — MONTELUKAST SODIUM 10 MG/1
TABLET ORAL
Qty: 90 TABLET | Refills: 0 | Status: SHIPPED | OUTPATIENT
Start: 2023-02-04

## 2023-04-26 RX ORDER — MONTELUKAST SODIUM 10 MG/1
TABLET ORAL
Qty: 90 TABLET | Refills: 0 | Status: SHIPPED | OUTPATIENT
Start: 2023-04-26

## 2023-07-25 RX ORDER — MONTELUKAST SODIUM 10 MG/1
10 TABLET ORAL DAILY
Qty: 90 TABLET | Refills: 0 | OUTPATIENT
Start: 2023-07-25

## 2023-08-08 RX ORDER — MONTELUKAST SODIUM 10 MG/1
10 TABLET ORAL DAILY
Qty: 90 TABLET | Refills: 0 | Status: SHIPPED | OUTPATIENT
Start: 2023-08-08

## 2023-09-11 ENCOUNTER — TELEMEDICINE (OUTPATIENT)
Dept: INTERNAL MEDICINE CLINIC | Facility: CLINIC | Age: 28
End: 2023-09-11
Payer: COMMERCIAL

## 2023-09-11 DIAGNOSIS — E55.9 VITAMIN D DEFICIENCY: Primary | ICD-10-CM

## 2023-09-11 DIAGNOSIS — N92.0 MENORRHAGIA WITH REGULAR CYCLE: ICD-10-CM

## 2023-09-11 DIAGNOSIS — N94.10 PAIN IN FEMALE GENITALIA ON INTERCOURSE: ICD-10-CM

## 2023-09-11 DIAGNOSIS — L65.9 HAIR LOSS: ICD-10-CM

## 2023-09-11 NOTE — PROGRESS NOTES
This visit was conducted using telemedicine with live interactive video and audio   Patient verbally consents to conduct video visit   Patient understands and accepts financial responsibility for any deductible, co-insurance and/or co-pays associated with this service. Paz Cabral is a 29year old female. CC: heavy period   HPI:Patient with PMH as listed below scheduled a visit for :  Had a baby under 1 year ago   Since she got her period back in July   She is currently breast feeding  No birth control or IUD  Has been heavy   Heavier that she ever had   7 days but heavier flow   Since July has been getting her period monthly  She feels lightheaded during the period   Not feeling good   Last cycle had some pelvic pain anytime she she stands up or move around   Much worse when she had intercourse with her    Had normal postpartum hair loss x 3 months   But since period is back feeling super tired and fatigued       Hypothyroidism   She is taking levothyroxine         She is taking Singulair   She is still taking her prenatal vitamins   Has been taking some ibuprofen         Past Medical History:   Diagnosis Date    Anemia 11/21/2022    Anxiety     Hyperlipidemia     Hypothyroidism 06/17/2014      Past Surgical History:   Procedure Laterality Date    TONSILLECTOMY        Current Outpatient Medications   Medication Sig Dispense Refill    montelukast 10 MG Oral Tab Take 1 tablet (10 mg total) by mouth daily. 90 tablet 0    albuterol 108 (90 Base) MCG/ACT Inhalation Aero Soln Inhale 2 puffs into the lungs every 6 (six) hours as needed for Wheezing or Shortness of Breath.  18 g 0    SERTRALINE 50 MG Oral Tab TAKE 1 TABLET(50 MG) BY MOUTH DAILY 90 tablet 0    LEVOTHYROXINE 50 MCG Oral Tab TAKE 1 TABLET(50 MCG) BY MOUTH BEFORE BREAKFAST 90 tablet 1    levothyroxine 50 MCG Oral Tab Take 1 tablet (50 mcg total) by mouth before breakfast. (Patient not taking: Reported on 11/21/2022) 90 tablet 1 sertraline 50 MG Oral Tab Take 1 tablet (50 mg total) by mouth in the morning. (Patient not taking: Reported on 2022) 90 tablet 0    fluconazole 150 MG Oral Tab Take 1 tablet (150 mg total) by mouth once a week. (Patient not taking: Reported on 2022) 3 tablet 0    ibuprofen 600 MG Oral Tab Take 1 tablet (600 mg total) by mouth every 6 (six) hours as needed for Pain. 30 tablet 0    docusate sodium 100 MG Oral Cap Take 100 mg by mouth 2 (two) times daily as needed for constipation. 30 capsule 0    IRON OR  (Patient not taking: Reported on 2022)      prenatal vitamin w/DHA 27-0.8-228 MG Oral Cap Take 1 capsule by mouth daily. Patient Active Problem List:     Hypothyroidism     COVID-19 affecting pregnancy in second trimester     History of delivery by vacuum extraction, currently pregnant     Post term pregnancy      (normal spontaneous vaginal delivery)     Third-stage postpartum hemorrhage     Anemia     Vitamin D deficiency     Hair loss     Menorrhagia with regular cycle    Family History   Problem Relation Age of Onset    Ovarian Cancer Mother 43        BRCA neg    Other (Other) Mother         asthma     Prostate Cancer Maternal Grandfather     Breast Cancer Paternal Grandmother          REVIEW OF SYSTEMS:    A comprehensive 10 point review of systems was completed. Pertinent positives and negatives noted in the the HPI.      EXAM was conducted through video     General: She is not in acute distress, normal appearance, not ill appearing  Pulmonary/ chest:  Speaking in full sentences, no increased work of breathing  Psychiatric: Behavior is normal, normal affect  Skin: No visible lesions  Extremities: no obvious extremity swelling noted       ASSESSMENT AND PLAN:  Bella Wang is a 29year old female.  (E55.9) Vitamin D deficiency  (primary encounter diagnosis)  Plan: US PELVIS W EV (CPT=76856/68794), CBC WITH         DIFFERENTIAL WITH PLATELET, IRON AND TIBC,         FERRITIN, TSH W REFLEX TO FREE T4, PROLACTIN,         TESTOSTERONE TOTAL & WEAKLY BOUND W/ SHBG,         VITAMIN D, 25-HYDROXY    (L65.9) Hair loss  Plan: US PELVIS W EV (CPT=76856/21146), CBC WITH         DIFFERENTIAL WITH PLATELET, IRON AND TIBC,         FERRITIN, TSH W REFLEX TO FREE T4, PROLACTIN,         TESTOSTERONE TOTAL & WEAKLY BOUND W/ SHBG,         VITAMIN D, 25-HYDROXY    (N92.0) Menorrhagia with regular cycle  Plan: US PELVIS W EV (CPT=76856/64576), CBC WITH         DIFFERENTIAL WITH PLATELET, IRON AND TIBC,         FERRITIN, TSH W REFLEX TO FREE T4, PROLACTIN,         TESTOSTERONE TOTAL & WEAKLY BOUND W/ SHBG,         VITAMIN D, 25-HYDROXY    (N94.10) Pain in female genitalia on intercourse       Orders Placed This Encounter      CBC With Differential With Platelet      Iron And Tibc      Ferritin      TSH W Reflex To Free T4      PROLACTIN [745] [Q]      Testosterone,Total and Weakly Bound w/ SHBG      VITAMIN D, SCREEN [41857][Q]        Advised to have blood work done   [employer] , tsh , testosterone and prolactin  Pregnancy test  US pelvis   Will check tsh , ferritin and iron to evaluate hair loss   Follow up with gyne to have pelvic exam and discussed options for heavy period after reviewing the blood work     Meds & Refills for this Visit:  Requested Prescriptions      No prescriptions requested or ordered in this encounter     Imaging & Consults:  US PELVIS W EV (ABZ=02161/02306)          Please note that the following visit was completed using two-way, real-time interactive audio and video communication. This has been done in good josue to provide continuity of care in the best interest of the provider-patient relationship, due to the ongoing public health crises/ national emergency  due to Steve 19 and because of restrictions of visitation. Every conscious effort was taken to allow for sufficient and adequate time.          Included in this visit, time may have been spent reviewing labs, medications, radiology tests and decision making. Appropriate medical decision-making and tests are ordered as detailed in the plan of care above. Coding/billing information is submitted for this visit based on complexity of care and/or time spent for the visit.

## 2023-09-29 LAB — HCG, QL, URINE: NEGATIVE

## 2023-10-03 LAB
% SATURATION: 32 % (CALC) (ref 16–45)
ABSOLUTE BASOPHILS: 30 CELLS/UL (ref 0–200)
ABSOLUTE EOSINOPHILS: 130 CELLS/UL (ref 15–500)
ABSOLUTE LYMPHOCYTES: 1428 CELLS/UL (ref 850–3900)
ABSOLUTE MONOCYTES: 289 CELLS/UL (ref 200–950)
ABSOLUTE NEUTROPHILS: 1824 CELLS/UL (ref 1500–7800)
BASOPHILS: 0.8 %
EOSINOPHILS: 3.5 %
FERRITIN: 16 NG/ML (ref 16–154)
FREE TESTOSTERONE: 3.5 PG/ML (ref 0.1–6.4)
HEMATOCRIT: 37.9 % (ref 35–45)
HEMOGLOBIN: 12.6 G/DL (ref 11.7–15.5)
IRON BINDING CAPACITY: 335 MCG/DL (CALC) (ref 250–450)
IRON, TOTAL: 108 MCG/DL (ref 40–190)
LYMPHOCYTES: 38.6 %
MCH: 28.8 PG (ref 27–33)
MCHC: 33.2 G/DL (ref 32–36)
MCV: 86.7 FL (ref 80–100)
MONOCYTES: 7.8 %
MPV: 10.2 FL (ref 7.5–12.5)
NEUTROPHILS: 49.3 %
PLATELET COUNT: 254 THOUSAND/UL (ref 140–400)
PROLACTIN: 6.2 NG/ML
RDW: 11.9 % (ref 11–15)
RED BLOOD CELL COUNT: 4.37 MILLION/UL (ref 3.8–5.1)
TESTOSTERONE, TOTAL,$/MS/MS: 55 NG/DL (ref 2–45)
TSH W/REFLEX TO FT4: 1.69 MIU/L
VITAMIN D, 25-OH, TOTAL: 22 NG/ML (ref 30–100)
WHITE BLOOD CELL COUNT: 3.7 THOUSAND/UL (ref 3.8–10.8)

## 2023-10-04 ENCOUNTER — HOSPITAL ENCOUNTER (OUTPATIENT)
Dept: ULTRASOUND IMAGING | Facility: HOSPITAL | Age: 28
Discharge: HOME | End: 2023-10-04
Attending: INTERNAL MEDICINE
Payer: COMMERCIAL

## 2023-10-04 DIAGNOSIS — N94.10 PAIN IN FEMALE GENITALIA ON INTERCOURSE: ICD-10-CM

## 2023-10-04 DIAGNOSIS — N92.0 MENORRHAGIA WITH REGULAR CYCLE: ICD-10-CM

## 2023-10-04 DIAGNOSIS — L65.9 HAIR LOSS: ICD-10-CM

## 2023-10-04 DIAGNOSIS — E55.9 VITAMIN D DEFICIENCY: ICD-10-CM

## 2023-10-04 PROCEDURE — 76830 TRANSVAGINAL US NON-OB: CPT | Performed by: INTERNAL MEDICINE

## 2023-10-04 PROCEDURE — 76856 US EXAM PELVIC COMPLETE: CPT | Performed by: INTERNAL MEDICINE

## 2023-10-06 ENCOUNTER — TELEPHONE (OUTPATIENT)
Dept: INTERNAL MEDICINE CLINIC | Facility: CLINIC | Age: 28
End: 2023-10-06

## 2023-10-06 DIAGNOSIS — R79.89 ABNORMAL CBC: ICD-10-CM

## 2023-10-06 DIAGNOSIS — R79.89 ELEVATED TESTOSTERONE LEVEL IN FEMALE: Primary | ICD-10-CM

## 2023-10-06 RX ORDER — ERGOCALCIFEROL 1.25 MG/1
50000 CAPSULE ORAL WEEKLY
Qty: 12 CAPSULE | Refills: 1 | Status: SHIPPED | OUTPATIENT
Start: 2023-10-06 | End: 2023-12-23

## 2023-10-06 NOTE — TELEPHONE ENCOUNTER
Patient received a message today from Dr. Nisha Leonardo that she should start taking Vitamin O02 & Folic Acid Serum, but patient is ready taking a pre- vitamin that contains I98 & Folic Acid:    prenatal vitamin w/DHA 27-0.8-228 MG Oral Cap     Should the patient take both of these simultaneously;  please call patient to clarify:    [phone]     KG

## 2023-10-09 DIAGNOSIS — Q50.5 PARA-OVARIAN CYST: Primary | ICD-10-CM

## 2023-10-09 NOTE — TELEPHONE ENCOUNTER
[email or web address] message sent to patient informing her of lab/ blood work orders placed by Dr Liza Hernandez for her to have B12/folic acid levels determined before Dr Liza Hernandez advises/ prescribes anything for her.

## 2023-10-09 NOTE — TELEPHONE ENCOUNTER
I just added the blood work for folic acid and vitamin b12   Didn't recommend that she takes them yet

## 2023-10-16 RX ORDER — MONTELUKAST SODIUM 10 MG/1
10 TABLET ORAL DAILY
Qty: 90 TABLET | Refills: 0 | Status: SHIPPED | OUTPATIENT
Start: 2023-10-16

## 2023-10-16 NOTE — TELEPHONE ENCOUNTER
Last OV:9-11-23  Last refill:8-8-23    With Internal Medicine (Luanne Garcia MD)11/28/2023 at  1:20 PM

## 2023-10-19 DIAGNOSIS — R79.89 ELEVATED TESTOSTERONE LEVEL IN FEMALE: Primary | ICD-10-CM

## 2023-10-19 DIAGNOSIS — R79.89 ELEVATED CORTISOL LEVEL: ICD-10-CM

## 2023-10-19 LAB
17 HYDROXYPROGESTERONE,$/MS/MS: 62 NG/DL
CORTISOL, TOTAL: 19.3 MCG/DL
DHEA SULFATE: 154 MCG/DL (ref 14–349)
FOLATE, SERUM: >24 NG/ML
VITAMIN B12: 658 PG/ML (ref 200–1100)

## 2023-11-10 ENCOUNTER — TELEPHONE (OUTPATIENT)
Dept: INTERNAL MEDICINE CLINIC | Facility: CLINIC | Age: 28
End: 2023-11-10

## 2023-11-10 RX ORDER — LEVOTHYROXINE SODIUM 0.05 MG/1
50 TABLET ORAL
Qty: 90 TABLET | Refills: 1 | OUTPATIENT
Start: 2023-11-10

## 2023-11-10 RX ORDER — LEVOTHYROXINE SODIUM 0.05 MG/1
50 TABLET ORAL
Qty: 90 TABLET | Refills: 0 | Status: SHIPPED | OUTPATIENT
Start: 2023-11-10 | End: 2024-02-08

## 2023-11-10 NOTE — TELEPHONE ENCOUNTER
Future Appt. 11/28/2023    Last Visit: 11/21/2022    Medication Requested:  Requested Prescriptions     Pending Prescriptions Disp Refills    levothyroxine 50 MCG Oral Tab 90 tablet 0     Sig: Take 1 tablet (50 mcg total) by mouth before breakfast.        Last refill:        LEVOTHYROXINE 50 MCG Oral Tab 90 tablet 1 11/1/2022

## 2023-11-28 ENCOUNTER — OFFICE VISIT (OUTPATIENT)
Dept: INTERNAL MEDICINE CLINIC | Facility: CLINIC | Age: 28
End: 2023-11-28
Payer: COMMERCIAL

## 2023-11-28 ENCOUNTER — OFFICE VISIT (OUTPATIENT)
Dept: OBGYN CLINIC | Facility: CLINIC | Age: 28
End: 2023-11-28

## 2023-11-28 VITALS
HEART RATE: 63 BPM | WEIGHT: 151.81 LBS | DIASTOLIC BLOOD PRESSURE: 70 MMHG | BODY MASS INDEX: 25.29 KG/M2 | SYSTOLIC BLOOD PRESSURE: 108 MMHG | OXYGEN SATURATION: 100 % | HEIGHT: 65 IN

## 2023-11-28 VITALS
HEART RATE: 82 BPM | BODY MASS INDEX: 23.52 KG/M2 | HEIGHT: 67.5 IN | DIASTOLIC BLOOD PRESSURE: 70 MMHG | SYSTOLIC BLOOD PRESSURE: 111 MMHG | WEIGHT: 151.63 LBS

## 2023-11-28 DIAGNOSIS — E61.1 LOW IRON: ICD-10-CM

## 2023-11-28 DIAGNOSIS — E03.9 HYPOTHYROIDISM, UNSPECIFIED TYPE: ICD-10-CM

## 2023-11-28 DIAGNOSIS — Z00.00 ANNUAL PHYSICAL EXAM: Primary | ICD-10-CM

## 2023-11-28 DIAGNOSIS — N92.6 MISSED MENSES: Primary | ICD-10-CM

## 2023-11-28 DIAGNOSIS — Z34.90 PREGNANCY, UNSPECIFIED GESTATIONAL AGE: ICD-10-CM

## 2023-11-28 DIAGNOSIS — R79.89 ABNORMAL CBC: ICD-10-CM

## 2023-11-28 PROBLEM — N83.209 OVARIAN CYST: Status: ACTIVE | Noted: 2023-11-28

## 2023-11-28 LAB
CONTROL LINE PRESENT WITH A CLEAR BACKGROUND (YES/NO): YES YES/NO
KIT LOT #: NORMAL NUMERIC
PREGNANCY TEST, URINE: POSITIVE

## 2023-11-28 PROCEDURE — 81025 URINE PREGNANCY TEST: CPT | Performed by: ADVANCED PRACTICE MIDWIFE

## 2023-11-28 PROCEDURE — 3074F SYST BP LT 130 MM HG: CPT | Performed by: ADVANCED PRACTICE MIDWIFE

## 2023-11-28 PROCEDURE — 3078F DIAST BP <80 MM HG: CPT | Performed by: INTERNAL MEDICINE

## 2023-11-28 PROCEDURE — 99213 OFFICE O/P EST LOW 20 MIN: CPT | Performed by: ADVANCED PRACTICE MIDWIFE

## 2023-11-28 PROCEDURE — 3074F SYST BP LT 130 MM HG: CPT | Performed by: INTERNAL MEDICINE

## 2023-11-28 PROCEDURE — 3078F DIAST BP <80 MM HG: CPT | Performed by: ADVANCED PRACTICE MIDWIFE

## 2023-11-28 PROCEDURE — 3008F BODY MASS INDEX DOCD: CPT | Performed by: INTERNAL MEDICINE

## 2023-11-28 PROCEDURE — 3008F BODY MASS INDEX DOCD: CPT | Performed by: ADVANCED PRACTICE MIDWIFE

## 2023-11-28 PROCEDURE — 99395 PREV VISIT EST AGE 18-39: CPT | Performed by: INTERNAL MEDICINE

## 2023-11-28 RX ORDER — MELATONIN
50 DAILY
COMMUNITY

## 2023-11-28 NOTE — PROGRESS NOTES
CHIEF COMPLAINT:  Chief Complaint   Patient presents with    Gyn Exam     Missed menses, previous pt. LMP 10/8 giving LIBBY 24      HPI:  Saroj Butler is 29year old, female, here today with her  for a missed menses visit. Currently, she is feeling well. Denies 1st trimester danger signs. Has had some nausea but started vitamin B6 which she thinks has helped. Patient's last menstrual period was 10/08/2023 (exact date). Menarche: 15 y/o  Period Cycle (Days): absent due to pregnancy  Hx Prior Abnormal Pap: No         VAVD after pushing for 4 hours   10/2022  with third stage PPH and trailing membranes. Had PP endometritis. Current unplanned but desired    /FOB: Pedro , supportive & involved  Family H/O of genetic disorders: None  Cats at home: none.    Occupational hazzards: n/a; Stay at home mom  H/O of STIs: denies  H/O of chicken pox or vaccine: had chicken pox  Exercise: not currently  History of MRSA or other difficult to treat infections: none  Recent Travel outside [place] none    HISTORY:  Past Medical History:   Diagnosis Date    Anemia 2022    Anxiety     Hyperlipidemia     Hypothyroidism 2014      Past Surgical History:   Procedure Laterality Date    TONSILLECTOMY        Family History   Problem Relation Age of Onset    Ovarian Cancer Mother 43        BRCA neg    Other (Other) Mother         asthma     Prostate Cancer Maternal Grandfather     Breast Cancer Paternal Grandmother       Social History:   Social History     Socioeconomic History    Marital status:    Tobacco Use    Smoking status: Never    Smokeless tobacco: Never   Substance and Sexual Activity    Alcohol use: Yes     Comment: occ prior to pregnancy     Drug use: No    Sexual activity: Yes     Comment: NONE   Other Topics Concern    Caffeine Concern Yes     Comment: coffee     Safe relationship/safe home environment      Medications (Active prior to today's visit):  Current Outpatient Medications   Medication Sig Dispense Refill    pyridoxine 50 MG Oral Tab Take 1 tablet (50 mg total) by mouth daily. levothyroxine 50 MCG Oral Tab Take 1 tablet (50 mcg total) by mouth before breakfast. 90 tablet 0    montelukast 10 MG Oral Tab Take 1 tablet (10 mg total) by mouth daily. 90 tablet 0    ergocalciferol 1.25 MG (02900 UT) Oral Cap Take 1 capsule (50,000 Units total) by mouth once a week for 12 doses. 12 capsule 1    albuterol 108 (90 Base) MCG/ACT Inhalation Aero Soln Inhale 2 puffs into the lungs every 6 (six) hours as needed for Wheezing or Shortness of Breath. 18 g 0    prenatal vitamin w/DHA 27-0.8-228 MG Oral Cap Take 1 capsule by mouth daily. ibuprofen 600 MG Oral Tab Take 1 tablet (600 mg total) by mouth every 6 (six) hours as needed for Pain. (Patient not taking: Reported on 11/28/2023) 30 tablet 0       Allergies: Allergies   Allergen Reactions    Shellfish DIARRHEA, NAUSEA AND VOMITING, PALPITATIONS, DIZZINESS, SHORTNESS OF BREATH and ANXIETY     Oyster sauce    Lactose DIARRHEA       REVIEW OF SYSTEMS:  Review of Systems   Constitutional:  Positive for fatigue. Negative for chills and fever. HENT:  Negative for sore throat. Cardiovascular:  Negative for chest pain and palpitations. Gastrointestinal:  Positive for nausea. Negative for abdominal pain and vomiting. Genitourinary:  Negative for frequency, pelvic pain, vaginal bleeding, vaginal discharge and vaginal pain. Neurological:  Negative for dizziness, light-headedness and headaches. Psychiatric/Behavioral: Negative. PHYSICAL EXAM:  Vitals:    11/28/23 1408   BP: 111/70   Pulse: 82     Physical Exam  Vitals reviewed. Constitutional:       General: She is not in acute distress. Appearance: Normal appearance. She is normal weight. HENT:      Head: Normocephalic and atraumatic. Cardiovascular:      Rate and Rhythm: Normal rate.    Pulmonary:      Effort: Pulmonary effort is normal. No respiratory distress. Neurological:      Mental Status: She is alert and oriented to person, place, and time. Psychiatric:         Mood and Affect: Mood normal.         Behavior: Behavior normal.         Thought Content: Thought content normal.         Judgment: Judgment normal.          No results found for this or any previous visit (from the past 24 hour(s)). ASSESSMENT/PLAN:   Antonio Canales was seen today for gyn exam.    Diagnoses and all orders for this visit:    Missed menses  -     Urine Pregnancy Test       Today's UCG positive result reviewed with patient  Appropriate for CNM care   Has prenatal vitamins that she is taking      Pre-eclampsia Risk Assessment:   High Risk Factors (any 1 of below): n/a  - H/O preeclampsia in prior pregnancy  - Autoimmune disease (lupus, antiphospholipid syndrome)  - Multifetal pregnancy  - cHTN  - Diabetes  Moderate Risk Factors (any 2 of below) n/a   - Nulliparus  - Obesity  - H/O preeclampsia in 1st degree relative  - Socioeconomic characeristics (AA race, low socioeconomic status)  - AMA  - Personal H/O prior SGA or low birth weight, adverse pregnancy outcome, >10yr pregnancy interval    Next visit: Patient to schedule Nurse Education visit, next available, and NOB for 12wga    Counseling included:  -- CNM care, philosophy, and protocols  -- Discussed importance of folic acid, calcium, vitamin D  -- Reviewed pregnancy recommendations regarding weight gain, diet, safe food preparation and consumption  -- Travel discussed, avoid travel to zika and COVID zones, use of support stockings with flights longer than 3 hours, movement every 2-3 hours if driving  -- Discussed nutrition, exercise, weight gain. -- Discussed avoidance of Jacuzzis, saunas, hot tubs and steam rooms  -- Discussed avoidance of alcohol, smoking, and minimizing caffeine  -- Warning signs reviewed. Advised to call office if occur.  Safe use of [employer] for messaging  -- Reviewed genetic/chromosomal screening guidelines for pregnancies  -- Schedule RN OB ED visit   -- 30 minutes face to face counseling, chart review, orders and coordination of care    Pt verbalized understanding. All questions answered.  No barriers to learning identified

## 2023-12-11 ENCOUNTER — NURSE ONLY (OUTPATIENT)
Dept: OBGYN CLINIC | Facility: CLINIC | Age: 28
End: 2023-12-11
Payer: COMMERCIAL

## 2023-12-11 DIAGNOSIS — Z34.81 ENCOUNTER FOR SUPERVISION OF OTHER NORMAL PREGNANCY IN FIRST TRIMESTER: Primary | ICD-10-CM

## 2023-12-11 DIAGNOSIS — Z13.31 POSITIVE DEPRESSION SCREENING: ICD-10-CM

## 2023-12-11 NOTE — PROGRESS NOTES
Pt called today for RN [facility] Education. Missed menses apt with: MB  LMP:10/8/2023  +UPT in office:      Pre  BMI: 23.13  ASA Therapy:     EPDS score:9- interested in [facility]    Working LIBBY:24  Hx of genetic abnormality in family: None known    SDOH Screening: Pt. Has answered NO 5P questions and has NO  risk factors. Pt. Given What pregnant women need to know handout. Educational material reviewed with patient: Prenatal care, nutrition, weight gain recommendations, travel, exercise, intercourse, pregnancy changes, safe medications, pregnancy and work, fetal movement, labor and  labor, warning signs, food safety, tdap, cord blood, breastfeeding, circumcision, and Group B strep.      Blood transfusion if needed: Yes    PN labs: Orded through [facility]  Iron Supplementation: Yes    Pt declined optional genetic screening    NOB apt: Scheduled with MB 24

## 2023-12-21 ENCOUNTER — TELEPHONE (OUTPATIENT)
Dept: OBGYN CLINIC | Facility: CLINIC | Age: 28
End: 2023-12-21

## 2023-12-21 RX ORDER — ONDANSETRON 4 MG/1
4 TABLET, FILM COATED ORAL EVERY 8 HOURS PRN
Qty: 30 TABLET | Refills: 0 | Status: SHIPPED | OUTPATIENT
Start: 2023-12-21

## 2023-12-22 ENCOUNTER — E-VISIT (OUTPATIENT)
Dept: TELEHEALTH | Age: 28
End: 2023-12-22
Payer: COMMERCIAL

## 2023-12-22 DIAGNOSIS — J01.90 ACUTE SINUSITIS, RECURRENCE NOT SPECIFIED, UNSPECIFIED LOCATION: Primary | ICD-10-CM

## 2023-12-22 PROCEDURE — 99421 OL DIG E/M SVC 5-10 MIN: CPT | Performed by: PHYSICIAN ASSISTANT

## 2023-12-22 RX ORDER — AMOXICILLIN 875 MG/1
875 TABLET, COATED ORAL 2 TIMES DAILY
Qty: 14 TABLET | Refills: 0 | Status: SHIPPED | OUTPATIENT
Start: 2023-12-22 | End: 2023-12-29

## 2023-12-22 NOTE — TELEPHONE ENCOUNTER
Phone call from patient. Reports ongoing nausea and vomiting this pregnancy. Tonight she had a nickel sized clot in her emesis. This was the first time she had vomiting today. She has been able to keep food and drink down. She has been taking unisom and B6 which has helped somewhat but still has all day nausea and vomiting at least once per day. Recommend monitoring. If any additional blood in vomit or unable to keep food or drink down instructed her to call back. Discussed option of starting zofran when needed. Reviewed possible risks. Patient would like zofran to pharmacy. Instructed her to continue unisom and B6 and take zofran as needed.

## 2023-12-22 NOTE — PROGRESS NOTES
Camila Alford is a 29year old female. HPI:   See answers to questions above. Current Outpatient Medications   Medication Sig Dispense Refill    amoxicillin 875 MG Oral Tab Take 1 tablet (875 mg total) by mouth 2 (two) times daily for 7 days. 14 tablet 0    ondansetron (ZOFRAN) 4 mg tablet Take 1 tablet (4 mg total) by mouth every 8 (eight) hours as needed for Nausea. 30 tablet 0    pyridoxine 50 MG Oral Tab Take 1 tablet (50 mg total) by mouth daily. levothyroxine 50 MCG Oral Tab Take 1 tablet (50 mcg total) by mouth before breakfast. 90 tablet 0    montelukast 10 MG Oral Tab Take 1 tablet (10 mg total) by mouth daily. 90 tablet 0    ergocalciferol 1.25 MG (90458 UT) Oral Cap Take 1 capsule (50,000 Units total) by mouth once a week for 12 doses. 12 capsule 1    albuterol 108 (90 Base) MCG/ACT Inhalation Aero Soln Inhale 2 puffs into the lungs every 6 (six) hours as needed for Wheezing or Shortness of Breath. 18 g 0    ibuprofen 600 MG Oral Tab Take 1 tablet (600 mg total) by mouth every 6 (six) hours as needed for Pain. (Patient not taking: Reported on 11/28/2023) 30 tablet 0    prenatal vitamin w/DHA 27-0.8-228 MG Oral Cap Take 1 capsule by mouth daily.         Past Medical History:   Diagnosis Date    Anemia 11/21/2022    Anxiety     Depression     Hypothyroidism 06/17/2014      Past Surgical History:   Procedure Laterality Date    TONSILLECTOMY        Family History   Problem Relation Age of Onset    Lipids Father     Ovarian Cancer Mother 43        BRCA neg    Other (Other) Mother         asthma     Prostate Cancer Maternal Grandfather     Breast Cancer Paternal Grandmother       Social History:  Social History     Socioeconomic History    Marital status:    Tobacco Use    Smoking status: Never    Smokeless tobacco: Never   Substance and Sexual Activity    Alcohol use: Not Currently     Comment: occ prior to pregnancy     Drug use: No    Sexual activity: Yes     Comment: NONE   Other Topics Concern    Caffeine Concern Yes     Comment: coffee     Social Determinants of Health     Financial Resource Strain: Low Risk  (12/10/2023)    Financial Resource Strain     Difficulty of Paying Living Expenses: Not very hard     Med Affordability: No   Food Insecurity: No Food Insecurity (12/10/2023)    Food Insecurity     Food Insecurity: Never true   Transportation Needs: No Transportation Needs (12/11/2023)    Transportation Needs     Lack of Transportation: No   Stress: Stress Concern Present (12/11/2023)    Stress     Feeling of Stress : Yes   Housing Stability: Low Risk  (12/11/2023)    Housing Stability     Housing Instability: No         ASSESSMENT AND PLAN:     Encounter Diagnosis   Name Primary? Acute sinusitis, recurrence not specified, unspecified location Yes           Meds & Refills for this Visit:  Requested Prescriptions     Signed Prescriptions Disp Refills    amoxicillin 875 MG Oral Tab 14 tablet 0     Sig: Take 1 tablet (875 mg total) by mouth 2 (two) times daily for 7 days.        Duration of  the service:  8 minutes    Patient advised to follow up with PCP if no improvement or worsening of symptoms  [facility]er to [facility] message for specific patient instructions

## 2023-12-23 LAB
ABSOLUTE BASOPHILS: 40 CELLS/UL (ref 0–200)
ABSOLUTE EOSINOPHILS: 87 CELLS/UL (ref 15–500)
ABSOLUTE LYMPHOCYTES: 1420 CELLS/UL (ref 850–3900)
ABSOLUTE MONOCYTES: 322 CELLS/UL (ref 200–950)
ABSOLUTE NEUTROPHILS: 4831 CELLS/UL (ref 1500–7800)
ALBUMIN/GLOBULIN RATIO: 1.7 (CALC) (ref 1–2.5)
ALBUMIN: 4 G/DL (ref 3.6–5.1)
ALKALINE PHOSPHATASE: 37 U/L (ref 31–125)
ALT: 12 U/L (ref 6–29)
AST: 14 U/L (ref 10–30)
BASOPHILS: 0.6 %
BILIRUBIN, TOTAL: 0.3 MG/DL (ref 0.2–1.2)
BUN: 8 MG/DL (ref 7–25)
CALCIUM: 9.6 MG/DL (ref 8.6–10.2)
CARBON DIOXIDE: 25 MMOL/L (ref 20–32)
CHLORIDE: 106 MMOL/L (ref 98–110)
CREATININE: 0.58 MG/DL (ref 0.5–0.96)
EGFR: 126 ML/MIN/1.73M2
EOSINOPHILS: 1.3 %
GLOBULIN: 2.3 G/DL (CALC) (ref 1.9–3.7)
GLUCOSE: 89 MG/DL (ref 65–99)
HEMATOCRIT: 37.1 % (ref 35–45)
HEMOGLOBIN: 12.3 G/DL (ref 11.7–15.5)
LYMPHOCYTES: 21.2 %
MCH: 28.9 PG (ref 27–33)
MCHC: 33.2 G/DL (ref 32–36)
MCV: 87.1 FL (ref 80–100)
MONOCYTES: 4.8 %
MPV: 10.1 FL (ref 7.5–12.5)
NEUTROPHILS: 72.1 %
PLATELET COUNT: 258 THOUSAND/UL (ref 140–400)
POTASSIUM: 3.7 MMOL/L (ref 3.5–5.3)
PROTEIN, TOTAL: 6.3 G/DL (ref 6.1–8.1)
RDW: 12.6 % (ref 11–15)
RED BLOOD CELL COUNT: 4.26 MILLION/UL (ref 3.8–5.1)
SODIUM: 139 MMOL/L (ref 135–146)
TSH W/REFLEX TO FT4: 2.05 MIU/L
WHITE BLOOD CELL COUNT: 6.7 THOUSAND/UL (ref 3.8–10.8)

## 2023-12-29 ENCOUNTER — LAB ENCOUNTER (OUTPATIENT)
Dept: LAB | Age: 28
End: 2023-12-29
Attending: ADVANCED PRACTICE MIDWIFE
Payer: COMMERCIAL

## 2023-12-29 DIAGNOSIS — Z34.81 ENCOUNTER FOR SUPERVISION OF OTHER NORMAL PREGNANCY IN FIRST TRIMESTER: ICD-10-CM

## 2023-12-29 LAB
ANTIBODY SCREEN: NEGATIVE
TSI SER-ACNC: 1.5 MIU/ML (ref 0.55–4.78)

## 2023-12-29 PROCEDURE — 36415 COLL VENOUS BLD VENIPUNCTURE: CPT

## 2023-12-29 PROCEDURE — 83020 HEMOGLOBIN ELECTROPHORESIS: CPT

## 2023-12-29 PROCEDURE — 86850 RBC ANTIBODY SCREEN: CPT

## 2023-12-29 PROCEDURE — 83021 HEMOGLOBIN CHROMOTOGRAPHY: CPT

## 2023-12-29 PROCEDURE — 84443 ASSAY THYROID STIM HORMONE: CPT | Performed by: INTERNAL MEDICINE

## 2024-01-03 LAB
HGB A2 MFR BLD: 2.6 % (ref 1.5–3.5)
HGB PNL BLD ELPH: 97.4 % (ref 95.5–100)

## 2024-01-04 ENCOUNTER — TELEPHONE (OUTPATIENT)
Dept: OBGYN CLINIC | Facility: CLINIC | Age: 29
End: 2024-01-04

## 2024-01-04 ENCOUNTER — INITIAL PRENATAL (OUTPATIENT)
Dept: OBGYN CLINIC | Facility: CLINIC | Age: 29
End: 2024-01-04
Payer: COMMERCIAL

## 2024-01-04 VITALS
BODY MASS INDEX: 23 KG/M2 | SYSTOLIC BLOOD PRESSURE: 107 MMHG | HEART RATE: 84 BPM | WEIGHT: 149 LBS | DIASTOLIC BLOOD PRESSURE: 73 MMHG

## 2024-01-04 DIAGNOSIS — Z34.81 ENCOUNTER FOR SUPERVISION OF OTHER NORMAL PREGNANCY IN FIRST TRIMESTER: Primary | ICD-10-CM

## 2024-01-04 PROBLEM — Z34.90 PREGNANCY (HCC): Status: RESOLVED | Noted: 2023-11-28 | Resolved: 2024-01-04

## 2024-01-04 PROBLEM — Z34.90 PREGNANCY: Status: RESOLVED | Noted: 2023-11-28 | Resolved: 2024-01-04

## 2024-01-04 PROBLEM — O98.512 COVID-19 AFFECTING PREGNANCY IN SECOND TRIMESTER: Status: RESOLVED | Noted: 2022-06-06 | Resolved: 2024-01-04

## 2024-01-04 PROBLEM — O48.0 POST TERM PREGNANCY (HCC): Status: RESOLVED | Noted: 2022-10-01 | Resolved: 2024-01-04

## 2024-01-04 PROBLEM — O98.512 COVID-19 AFFECTING PREGNANCY IN SECOND TRIMESTER (HCC): Status: RESOLVED | Noted: 2022-06-06 | Resolved: 2024-01-04

## 2024-01-04 PROBLEM — D64.9 ANEMIA: Status: RESOLVED | Noted: 2022-11-21 | Resolved: 2024-01-04

## 2024-01-04 PROBLEM — Z87.59 HISTORY OF POSTPARTUM HEMORRHAGE: Status: ACTIVE | Noted: 2022-10-02

## 2024-01-04 PROBLEM — O48.0 POST TERM PREGNANCY: Status: RESOLVED | Noted: 2022-10-01 | Resolved: 2024-01-04

## 2024-01-04 PROBLEM — U07.1 COVID-19 AFFECTING PREGNANCY IN SECOND TRIMESTER (HCC): Status: RESOLVED | Noted: 2022-06-06 | Resolved: 2024-01-04

## 2024-01-04 PROBLEM — E03.9 HYPOTHYROIDISM: Status: RESOLVED | Noted: 2017-05-09 | Resolved: 2024-01-04

## 2024-01-04 PROBLEM — U07.1 COVID-19 AFFECTING PREGNANCY IN SECOND TRIMESTER: Status: RESOLVED | Noted: 2022-06-06 | Resolved: 2024-01-04

## 2024-01-04 PROBLEM — Z00.00 ANNUAL PHYSICAL EXAM: Status: RESOLVED | Noted: 2023-11-28 | Resolved: 2024-01-04

## 2024-01-04 PROBLEM — E61.1 LOW IRON: Status: RESOLVED | Noted: 2023-11-28 | Resolved: 2024-01-04

## 2024-01-04 PROCEDURE — 3078F DIAST BP <80 MM HG: CPT | Performed by: ADVANCED PRACTICE MIDWIFE

## 2024-01-04 PROCEDURE — 3074F SYST BP LT 130 MM HG: CPT | Performed by: ADVANCED PRACTICE MIDWIFE

## 2024-01-04 NOTE — PATIENT INSTRUCTIONS
Healthy Eating Habits During Pregnancy    It’s important to develop healthy eating habits while you are pregnant, for you as well as for your baby. Here are some ways to stay healthy.  Aim for a healthy weight  A slow, steady rate of weight gain is often best. After the first trimester, you may gain about a pound a week. If you were overweight before pregnancy, you need to gain fewer pounds. Your healthcare provider can give you a healthy weight goal for your pregnancy.  Don’t diet  Now is not the time to diet. You may not get enough of the nutrients you and your baby need. Instead, learn how to be a healthy eater. Start by doing it for your baby. Soon, you may do it for yourself.  Vitamins and supplements  Talk with your healthcare provider about taking these and other prenatal vitamins and supplements.  Iron makes the extra blood you need now.  Calcium and vitamin D help build and keep strong bones.  Folic acid helps prevent certain birth defects.  Iodine helps the thyroid work right.  Some vitamins may not be safe to take. Your healthcare provider will tell you which ones to avoid.  Fluids  Drink at least 8 to 10 cups of fluid daily. Your baby needs fluids. Fluids also decrease constipation, flush out toxins and waste, limit swelling, and help prevent bladder infections. Water is best. Other good choices are:  Water or seltzer water with a slice of lemon or lime (These can also help ease an upset stomach.)  Clear soups that are low in salt  Low-fat or fat-free milk, soy or rice milk with calcium added  Popsicles or gelatin  Things to avoid  Some things might harm your growing baby. Don’t eat or drink:  Alcohol  Unpasteurized dairy foods and juices  Raw or undercooked meat, poultry, fish, or eggs  Unwashed fruits and vegetables  Prepared meats, like deli meats or hot dogs, unless heated until steaming hot  Fish that are high in mercury, like shark, swordfish, suri mackerel, tilefish, and albacore tuna  Things to  limit  Ask your healthcare provider whether it’s safe to eat or drink:  Caffeine  Artificial sweeteners  Organ meats  Certain types of fish  Fish and shellfish that contain mercury in lower amounts, like shrimp, canned light tuna, salmon, pollock, and catfish  Crystal last reviewed this educational content on 2018 [employer]. [address] [place] [address], [place] [address]. All rights reserved. This information is not intended as a substitute for professional medical care. Always follow your healthcare professional's instructions.        Nutrition During Pregnancy    Having a healthy baby depends mostly on you. What you eat matters to your baby and your health. During pregnancy, you will likely need about 300 more calories per day than before you became pregnant. Each day, try to eat the number of servings listed here for each food group. In addition, cut down on salt and caffeine. Limit the amount of sweets and high-fat foods you eat. Don’t smoke or drink alcohol.  Important: See your healthcare provider as often as requested. If you have any questions, be sure to ask them.  Fruits  2 cups  Examples of 1-cup servings:  1 medium apple  1 medium orange  1 medium banana  1 cup chopped fruit  1 cup 100% fruit juice (pasteurized)  1/2 cup dried fruit Vegetables  2-1/2 to 3 cups   Examples of 1 servin cups raw, leafy greens  1 cup raw or cooked cut-up vegetables  1 cup 100% vegetable juice (pasteurized) Grains & Cereals*  6 to 8 ounces  Examples of 1-ounce servings:  1 slice bread  1/2 cup cooked rice  1/2 cup cooked cereal  1/2 cup pasta  1 ounce cold cereal Fats & Oils  6 to 8 teaspoons   Dairy**  3 cups  Examples of 1-cup servings:  1 cup milk  1 cup yogurt  1-1/2 ounces natural cheese  2 ounces processed cheese Protein---  5 to 6-1/2 ounces  Examples of 1-ounce servings:  1 egg  1 ounce of lean meat, poultry, or fish  1/4 cup cooked beans  1 tablespoon peanut butter  1/2 ounce nuts  Fluids  8 or more 8-ounce glasses  Examples:  Water  Diluted juices: Apple, orange, cranberry  Mineral water  Clear soups, broth     *Note: Choose whole grains whenever possible.  ** Note: Try to choose low-fat options; avoid soft cheeses and unpasteurized milk.  --- Notes: Avoid raw or undercooked meats, eggs, and seafood. fish, and shellfish. Also, some types of fish, like shark, swordfish, and suri mackerel should not be eaten during pregnancy. Avoid hot dogs, luncheon meats, and cold cuts unless heated to steaming just before being served. Ask your healthcare provider about safe choices.     Prenatal supplements  A prenatal supplement is a pill that you take daily during pregnancy. It helps make sure you’re getting the right amount of certain nutrients that are important to your baby. Ask your healthcare provider to help you choose the best one for you. Important nutrients during pregnancy include:  Folic acid. It's best to start taking this supplement 1 month before you start trying to get pregnant. Folic acid helps prevent certain problems in your baby. During pregnancy, you need to take 400 micrograms (mcg) of folic acid every day for the first 2 to 3 months after conception, and then 600 mcg is needed for growing fetus and placenta.  Iron, calcium, and vitamin D. You may also be advised to take these supplements during pregnancy. They help keep you and your baby healthy. Be sure to take them at different times because calcium makes it hard for the body to absorb iron. Taking iron with orange juice helps to increase its absorption.   [employer] last reviewed this educational content on 12/1/2017 © 2000-2020 The [employer]. [address] [place] [address], [place] [address]. All rights reserved. This information is not intended as a substitute for professional medical care. Always follow your healthcare professional's instructions.        Pregnancy: Planning Your Exercise Routine    While you’re pregnant, an  exercise routine helps both your mind and your body feel good. It tones your muscles and makes them stronger. It also gives you and your baby more oxygen.  The right exercise for you  Overall conditioning is best for you and your baby. Try walking, swimming, or riding a stationary bike. Always warm up, cool down, and drink enough fluids. Keep a snack close by in case your blood sugar gets low. Discuss exercise choices with your healthcare provider. Talk about the following:  If you already exercise, find out how to adapt your routine while you’re pregnant. Keep the intensity of the exercise moderate.  Ask if there are any local prenatal exercise classes, like yoga or water aerobics. Find out which prenatal exercise videos are good choices.  If you were not exercising before your pregnancy, find out the best way to start. Now is not the time to begin a new workout on your own. Start slowly. Listen to your body.  Ask which forms of exercise you should avoid. These may include risky activities like hot yoga, horseback riding, scuba diving, skiing, skating, and contact sports.  Pelvic tilts  These help strengthen your stomach muscles and low back. You can do pelvic tilts instead of sit-ups.  Do this exercise on your hands and knees.  Relax the back of your neck. Pull your stomach in until your low back flattens.  Hold for 30 seconds. Release. Repeat 10 times. Do this twice a day.  Kegel exercises  Kegel exercises strengthen the pelvic muscles. Doing Kegels daily helps prepare these muscles for delivery. Kegels also help ease your recovery. You exercise these muscles by tightening, holding, then relaxing them. To do 1 type of Kegel exercise, contract as if you were stopping your urine stream (but do it when you’re not urinating). Hold for 10 seconds, then repeat 10 times, a few times a day.  Tips to add activity  Here are some tips to follow:  Park the car farther from a store and walk.  If you can, do errands on foot  instead of driving.  Walk across the office to talk to someone in person instead of calling.  While waiting for appointments, go up and down stairs or around the block.   Tips to stay active  Here are some tips to follow:  Maintain your routine. But exercise less intensely if you feel tired.  Base your workout on how you feel, not your heart rate. Heart rates aren’t a good way to measure effort during pregnancy.  Avoid exercising on your back after week 16.   What are the warning signs that I should stop exercising?  Stop exercising and call your healthcare provider if you have any of these symptoms:  Vaginal bleeding   Dizziness or feeling faint   Increased shortness of breath   Chest pain   Headache   Muscle weakness   Calf (back of the leg) pain or swelling    Uterine contractions or pre-term labor   Decreased fetal movement   Fluid leaking (or gushing) from your vagina  [employer] last reviewed this educational content on 1/1/2018  © 0891-8902 [employer]. [address] [place] [address]. All rights reserved. This information is not intended as a substitute for professional medical care. Always follow your healthcare professional's instructions.        Exercise During Pregnancy  Regular exercise can help you adapt to the changes your body is going through during pregnancy. Exercising may help you relax, and it gets you ready for labor and delivery. Talk to your healthcare provider about the kinds of activities you can do. Then go ahead and enjoy them.    Get started  Even if you didn’t exercise before pregnancy, it is not too late to start. Choose an activity that you like and that fits your lifestyle. Begin slowly and build up a little at a time. Be sure to check with your healthcare provider before starting any exercise program. The following tips may help you get started:  Choose a time and place to exercise each day.  Wear loose-fitting clothes and comfortable athletic shoes.  Stretch  before and after you exercise. (Be sure to stretch slowly and to hold stretches for 30 to 40 seconds.)  Be active  Unless your healthcare provider says otherwise, try to exercise for 30 minutes or more most days of the week:  Overall conditioning, like swimming, bicycling, or walking, is especially beneficial.  Aerobics and exercises that increase your pulse rate help condition your body and strengthen your heart. Ask about special prenatal aerobics classes.  Exercise safely  These tips will help you have a safe, healthy workout:  Stay cool. Stop exercising if you feel overheated.  Slow down if you’re out of breath. If you can’t talk during exercise, lower the intensity of the workout.  Monitor the intensity of your workout. Only do moderate-intensity (not strenuous) exercise.  Stay off your back. Lying on your back can decrease blood flow to your baby.  Drink water before, during and after your workout.  Eat 300 extra calories a day. A light snack before and after you exercise will help keep your energy up.  Avoid activities requiring balancing skills later in pregnancy.  Do Kegel exercises  Kegel exercises strengthen the pelvic floor muscles used in childbirth. These muscles are the same ones used to stop the flow of urine. Do Kegel exercises daily:  Squeeze your pelvic floor muscles for a count of 3.  Relax, then squeeze again.  Repeat 10 to 15 times in a row at least 3 times a day.  You can do Kegel exercises anytime and anywhere.  Keep walking  No matter what other exercise you do, try to walk whenever you can:  If you’re working all day, take a lunchtime walk in the park with a friend.  When you shop, park away from the store entrance and walk the extra distance.  Take the stairs instead of the elevator.     When to stop exercising and call your healthcare provider  Call your healthcare provider right away if you have:  Shortness of breath before starting exercise  Vaginal bleeding  Dizziness or feeling  faint  Chest pain  Headache  Decreased fetal movement   contractions   Muscle weakness  Calf pain or swelling  Fluid leaking from the vagina   [employer] last reviewed this educational content on 2017 [employer] [address] [place] [address], [place] [address]. All rights reserved. This information is not intended as a substitute for professional medical care. Always follow your healthcare professional's instructions.        Pregnancy: Your Weight     Your weight will be checked regularly by your healthcare provider.   Being a healthy weight is important for both you and your baby. The weight you gain now is not just extra fat. It is also the weight of your baby. And it is the increased blood and fluids to support the baby. A slow, steady rate of gain is best. How much you should gain depends on your weight before getting pregnant. Check with your healthcare provider to find out what is right for you.  Talk to your healthcare provider if you have any questions.   If you gain too much  Gaining too much weight might cause you to feel tired or you could have a harder pregnancy or birth. If you and your healthcare provider decide you’re gaining too much:  Eat fewer fats and sugars. Instead, eat fruit, vegetables, and whole-grain foods.  Drink plenty of water between meals.  Get at least 20 minutes of light exercise, like walking, each day.  Don’t diet. You might not get enough of the nutrients you or your baby needs.  Keep a diet diary to help you gauge what and how much you are eating.  If you’re not gaining enough  If you don’t gain enough, your baby could be too small or have health problems. Women tend to gain most of their weight in the second and third trimesters. For now:  Eat many types of foods. Make sure you get enough calcium, protein, and carbohydrates.  Don’t skip meals.  Eat healthy snacks.  Pick nutrient-dense, high-calorie healthy food like trail mix or protein  maggie.  See a dietitian for help.  Talk to your healthcare provider if you have had an eating disorder or problems with certain foods.  The following are ways to get more calories:  Eat breakfast every day. Peanut butter or a slice of cheese on toast can give you an extra protein boost.  Snack between meals. Yogurt and dried fruits can provide protein, calcium, and minerals.  Try to eat more foods that are high in good fats, like nuts, fatty fish, avocados, and olive oil.  Drink juices made from real fruit that are high in vitamin C or beta carotene, like grapefruit juice, orange juice, papaya nectar, apricot nectar, and carrot juice.  Avoid junk food, like foods high in sugar.  [employer] last reviewed this educational content on 1/1/2018 © 2000-2020 The [employer]. [address] [place] [address]. All rights reserved. This information is not intended as a substitute for professional medical care. Always follow your healthcare professional's instructions.        Dental Care for Pregnant Women  Pregnancy is a time when your oral health needs more attention. Hormone changes during pregnancy can cause certain problems with teeth and gums, and make treatment more complicated.  How pregnancy affects oral health  During pregnancy, hormone changes can cause swollen, bleeding, and irritated gums (gingivitis). Your gums may be very sore, and brushing and flossing may cause discomfort. If left untreated, gingivitis can lead to a more serious gum disease called periodontitis. Severe periodontitis can lead to tooth loss.  Some pregnant women also have small bright-red growths on their gums that bleed easily. These are often called “pregnancy tumors.” They are not cancer. They usually go away right after birth. Talk with your dentist or healthcare provider if you have concerns.  Keeping a healthy mouth  Brush twice daily with fluoride toothpaste. Floss at least once a day.  If you have morning sickness,  rinse your mouth with a teaspoon of baking soda mixed with water after vomiting. Do not brush your teeth right after vomiting. This can remove tooth enamel.  See your dentist for cleanings and checkups more often if needed. This is especially true in your second and third trimesters.  Ask your dentist or healthcare provider if you should use a special mouth rinse to help prevent gingivitis.  Tell your dentist or healthcare provider about any changes in your mouth, such as soreness or bleeding.  Safety concerns  Make sure to tell your dentist that you’re pregnant. He or she can help you stay safe. If you need to have dental X-rays during pregnancy, he or she will make sure you are fully protected. You will wear a lead apron over your belly during the X-ray process. The apron helps block radiation from the X-rays.  If you need to take medicines like antibiotics or pain relievers for dental problems, talk with your healthcare providers first. Your providers will discuss the risks and benefits of taking these during pregnancy.  If you have a high-risk pregnancy, your dentist and your healthcare provider may advise that certain dental treatments wait until after you give birth.  Crystal last reviewed this educational content on 12/1/2017  © 5804-4869 [employer]. [address] [place] [address], [place] [address]. All rights reserved. This information is not intended as a substitute for professional medical care. Always follow your healthcare professional's instructions.

## 2024-01-04 NOTE — TELEPHONE ENCOUNTER
Pt called to schedule dating/confirmation, first trimester ultrasound. Note stated only Friday appointments. Called number on order and was referred back to office. Please call.
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
## 2024-01-05 LAB
CHLAMYDIA TRACHOMATIS$RNA, TMA: NOT DETECTED
NEISSERIA GONORRHOEAE$RNA, TMA: NOT DETECTED

## 2024-01-10 NOTE — TELEPHONE ENCOUNTER
Future Appointment:No future appt       Last Appointment:11/28/23      Last Refill:10/16/23      Medication Requested:  Requested Prescriptions     Pending Prescriptions Disp Refills    MONTELUKAST 10 MG Oral Tab [Pharmacy Med Name: MONTELUKAST 10MG TABLETS] 90 tablet 0     Sig: TAKE 1 TABLET(10 MG) BY MOUTH DAILY     Protocol :       Asthma & COPD Medication Protocol Tiikcu68/10/2024 06:40 AM    Asthma Action Score greater than or equal to 20    AAP/ACT given in last 12 months    Appointment in past 6 or next 3 months

## 2024-01-11 RX ORDER — MONTELUKAST SODIUM 10 MG/1
10 TABLET ORAL DAILY
Qty: 90 TABLET | Refills: 0 | Status: SHIPPED | OUTPATIENT
Start: 2024-01-11

## 2024-01-16 RX ORDER — ONDANSETRON 4 MG/1
4 TABLET, FILM COATED ORAL EVERY 8 HOURS PRN
Qty: 30 TABLET | Refills: 0 | Status: SHIPPED | OUTPATIENT
Start: 2024-01-16

## 2024-01-19 ENCOUNTER — ULTRASOUND ENCOUNTER (OUTPATIENT)
Dept: OBGYN CLINIC | Facility: CLINIC | Age: 29
End: 2024-01-19
Payer: COMMERCIAL

## 2024-01-19 DIAGNOSIS — Z34.81 ENCOUNTER FOR SUPERVISION OF OTHER NORMAL PREGNANCY IN FIRST TRIMESTER: ICD-10-CM

## 2024-01-19 PROCEDURE — 76801 OB US < 14 WKS SINGLE FETUS: CPT | Performed by: OBSTETRICS & GYNECOLOGY

## 2024-01-26 ENCOUNTER — PATIENT MESSAGE (OUTPATIENT)
Dept: INTERNAL MEDICINE CLINIC | Facility: CLINIC | Age: 29
End: 2024-01-26

## 2024-01-26 DIAGNOSIS — E03.9 HYPOTHYROIDISM, UNSPECIFIED TYPE: Primary | ICD-10-CM

## 2024-01-26 RX ORDER — LEVOTHYROXINE SODIUM 0.05 MG/1
TABLET ORAL
Qty: 105 TABLET | Refills: 3 | Status: SHIPPED | OUTPATIENT
Start: 2024-01-26

## 2024-01-26 RX ORDER — LEVOTHYROXINE SODIUM 0.05 MG/1
50 TABLET ORAL
Qty: 90 TABLET | Refills: 0 | Status: SHIPPED | OUTPATIENT
Start: 2024-01-26

## 2024-01-26 NOTE — TELEPHONE ENCOUNTER
Seble Calles MA 1/26/2024 10:41 AM CST      ----- Message -----  From: Christy Cespedes  Sent: 1/26/2024 10:28 AM CST  To: [facility] Clinical Staff  Subject: Levothyroxine prescription     Hi Dr. Patel,    At my annual physical in November, we discussed dosage for taking my Levothyroxine medication while pregnant. I went home with written instructions to take my medication as usual but to double up on the weekends. (Taking 2 tablets on Saturdays and 2 tablets on Sundays). Until now, I’ve had no issues with getting my prescriptions refilled.     My insurance only allows a 30-day prescription fill at a time. The current written instructions on my prescription bottle states “take 1 tablet(50 MCG) by mouth before breakfast”. I have 5 tablets left (after taking my dose today) which means I will run out on Monday (2 tablets on Saturday, 2 tablets on Sunday, last tablet on Monday). When I submitted my refill request to [facility] this morning, and had the refill request granted by prescriber, [facility] sent back a message saying due to insurance, it was too early fill.   I’m wondering if the way the prescription is currently written conveys that 1 month’s supply is 30 tablets when now, after the adjustment in November, it should be approximately 40 tablets a month.     Do you know if there’s a way we can fix this to get my insurance’s approval for more medication on time? Can the prescription be written differently? Would that help? Do I need to get special approval from insurance?    I apologize for the long message. I hope this was understandable!     Thanks in advance,  Christy Cespedes

## 2024-02-02 ENCOUNTER — ROUTINE PRENATAL (OUTPATIENT)
Dept: OBGYN CLINIC | Facility: CLINIC | Age: 29
End: 2024-02-02
Payer: COMMERCIAL

## 2024-02-02 VITALS
SYSTOLIC BLOOD PRESSURE: 123 MMHG | BODY MASS INDEX: 24 KG/M2 | HEART RATE: 71 BPM | DIASTOLIC BLOOD PRESSURE: 78 MMHG | WEIGHT: 155 LBS

## 2024-02-02 DIAGNOSIS — Z34.82 PRENATAL CARE, SUBSEQUENT PREGNANCY IN SECOND TRIMESTER: Primary | ICD-10-CM

## 2024-02-02 DIAGNOSIS — R21 RASH AND NONSPECIFIC SKIN ERUPTION: ICD-10-CM

## 2024-02-02 DIAGNOSIS — N81.89 VAGINAL RELAXATION: ICD-10-CM

## 2024-02-02 RX ORDER — ERGOCALCIFEROL 1.25 MG/1
50000 CAPSULE ORAL WEEKLY
COMMUNITY
Start: 2024-01-17

## 2024-02-02 NOTE — PROGRESS NOTES
Christy, , is at 16w5d, here for her [facility] visit.  Currently, she is feeling well. Denies 2nd trimester danger signs. However, she has two concerns:  1. She notes a rash on her arms and chest. Had this with prior pregnancies too and states mother has similar rash. Doesn't itch.  2. Feels vaginal pressure when bending down or straining on the toilet like her vagina is falling out.    Vital signs and weight reviewed  See flowsheets  Maculo-papular rash on both arms and all over chest and neck    Assessment/Plan: Anatomy scan ordered. Declines genetic/chromosomal screening  Rash: Bile salts and AST/ALT ordered. Stop prenatal vitamins for 2 weeks and monitor for improvement. Can consider Derm/allergy consult or refer to PCP if no resolution.  Vaginal relaxation: Pelvic PT ordered  Next visit: 4 weeks    Reviewed:   Prenatal visit schedule  Danger signs    Pt verbalized understanding. All questions answered. No barriers to learning identified

## 2024-02-02 NOTE — PATIENT INSTRUCTIONS
Comfort Tips During Pregnancy    Talk with your healthcare provider before using pain-relieving medicine at any time during your pregnancy.  First trimester tips  Nausea  Get up slowly. Eat a few unsalted crackers before you get out of bed.  Avoid smells that bother you.  Eat small bland low fat, light high-protein meals at frequent intervals.  Sip on water, weak tea, or clear soft drinks, like ginger ale. Eat ice chips.  Fatigue  Take catnaps when you can.  Get regular exercise.  Accept help from others.  Practice good sleep habits, like going to bed and getting up at the same time each day. Use your bed only for sleep and sex.  Mood swings  Talk about your feelings with others, including other mothers.  Limit sugar, chocolate, and caffeine.  Eat a healthy diet. Don’t skip meals.  Get regular exercise.  Headaches  Get fresh air and exercise.  Relax and get enough rest.  Check with your healthcare provider before taking any pain medicines.  Second trimester tips  Here are some suggestions to help you cope:  To limit ankle swelling, sit with your feet raised or wear support hose.  If you have pain in your groin and stomach (round ligament pain), avoid sudden twisting movements.  For leg cramps, flexing your foot often brings immediate relief. You also may try massaging your calf in long, downward strokes, or stretching your legs before going to bed. Get enough exercise and wear shoes with flexible soles.  Third trimester tips  Reducing heartburn  Eat small, light meals throughout the day rather than 3 large ones.  Sleep with your upper body raised 6 inches. Don’t lie down until 2 hours after you eat.  Don't eat greasy, fried, or spicy foods.  Avoid citrus fruits and juices.  Treating constipation  Eat foods high in fiber (whole-grain foods, fresh fruit and vegetables).  Drink plenty of water.  Get regular exercise.  Discuss other medicines (like docusate and psyllium) with your healthcare provider.  Taking care  of your breasts  Avoid using harsh soaps or alcohol, which can cause excessive dryness.  Wear nursing bras. They provide more support than regular bras and can be used after pregnancy if you breastfeed.  Getting a good night’s sleep  Take a warm shower before bed.  Sleep on a firm mattress.  Lie on your side with 1 leg crossed over the other.  Use pillows to support arms, legs, and belly.  [employer] last reviewed this educational content on 10/1/2017  © 7972-9363 The [employer]. [address] [place] [address]. All rights reserved. This information is not intended as a substitute for professional medical care. Always follow your healthcare professional's instructions.        Adapting to Pregnancy: Second Trimester    Keep up the healthy habits you started in your first trimester. You might be a little more tired than normal. So plan your day wisely. Look at the tips below and choose the ones that suit your lifestyle.  If you have any questions, check with your healthcare provider.  If you work  If you can, adjust your work with your employer to fit your needs. Try these tips:  If you stand for long periods, find ways to do some tasks while sitting. Also, try to stand with 1 foot resting on a low stool or ledge. Shift your weight from foot to foot often. Wear low-heeled shoes.  If you sit, keep your knees level with your hips. Rest your feet on a firm surface. Sit tall with support for your low back.  If you work long hours, ask about adjusting your schedule. Try taking shorter breaks more often.  When you travel  The second trimester may be the best time for any travel. Talk to your healthcare provider about any special plans you may need to make. Always:  Wear a seat belt. Fasten the lap part under your belly. Wear the shoulder part also.  Take breaks often during long trips by car or plane. Move around to stretch your legs.  Drink plenty of fluids on flights. The air in plane cabins is very  dry.  Avoid hot climates or high altitudes if you are not used to them.  Avoid places where the food and water might make you sick.  Make sure you are up-to-date on all immunizations, including the flu vaccine. This is especially important when traveling overseas.  Taking time to relax  Find time to rest and relax at work or at home:  Take short time-outs daily. Do relaxation exercises.  Breathe deeply during stressful times.  Try not to take on too much. Plan tasks for times when you have the most energy.  Take naps when you can. Or just sit and relax.  After week 16, avoid lying on your back for more than a few minutes. Instead, lie on your side. Switch sides often.  Continuing as lovers  Unless your healthcare provider tells you otherwise, there is no reason to stop having sex now. Blood supply increases to the pelvic area in the second trimester. Because of this, sex might be more enjoyable. Try different positions and see what’s best. Also, talk to your partner about any changes in desire. Spotting may happen after sex. Be sure to let your healthcare provider know if there is heavy bleeding.  Keeping your environment safe  You can still clean house and use scented products. Just take some simple precautions:  Wear gloves when using cleaning fluids.  Open windows to let in fresh air. Use a fan if you paint.  Avoid secondhand smoke.  Don’t breathe fumes from nail polish, hair spray, cleansers, or other chemicals.  [employer] last reviewed this educational content on 1/1/2018  © 1725-8702 The [employer]. [address] [place] [address], [place] [id number]. All rights reserved. This information is not intended as a substitute for professional medical care. Always follow your healthcare professional's instructions.        Pregnancy: Your Second Trimester Changes  Each day, you and your baby are changing and growing together. Here’s a quick look at what’s happening to both of you.  How you are changing  Even when you  don’t notice it, your body is adapting to meet the needs of your growing baby. The changes in your body might also affect your moods.  Your body  Your uterus expands as baby grows. As the weeks go by, you will feel more pressure on your bladder, stomach, and other organs. You may notice some skin color changes on your forehead, nose, or cheeks. Freckles may darken, and moles may grow. You may notice a darker line on your abdomen between your belly button and pubic bone in the midline.  Your moods  The second trimester is often easier than the first. Still, be prepared for mood swings. These are due to the increase in hormones (chemicals that affect the way organs work) produced by your body. These mood swings are a normal part of pregnancy.  How your baby is growing          Month 4  Baby’s heartbeat may be heard with a Doppler (hand-held ultrasound device) by 9 to 10 weeks. Eyebrows, eyelashes, and fingernails begin to form.  Month 5  You may feel your baby move. After a growth spurt, your baby nears 10 inches. Month 6  Baby’s fingerprints have formed. Your baby weighs about 1 to 2 pounds and is about 12 inches long.   [employer] last reviewed this educational content on 1/1/2018  © 7929-4041 The [employer], [employer]. [address] [place] [address], [place] [id number]. All rights reserved. This information is not intended as a substitute for professional medical care. Always follow your healthcare professional's instructions.     Pregnancy: Body Changes  From conception (fertilization) until after the birth of your child, you and your baby will change every day. To help you understand what is happening, we’ve outlined how pregnancy begins and some of the changes you may notice.    Your changing body  Pregnancy affects almost every part of your body. You may notice some of the following physical and emotional changes:  Your uterus expands outward and upward as your baby grows. You may feel pressure on your bladder, stomach,  and other organs.  You may notice skin color changes on your forehead, nose, and cheeks. A dark line may form from your bellybutton down to your pubic area. The skin color around your nipples and thighs may also change.  Pink stretch marks may appear on your abdomen, breasts, or hips.  Your hair may seem thicker. You lose less hair during pregnancy.  You may feel fine 1 day and weepy the next. This is caused by changes in your body, like increased hormones. These are chemicals that affect the function of certain organs and also your moods.  You may experience constipation, hemorrhoids, and/or heartburn.  You may experience mild shortness of breath.  Your legs may cramp.  You may have nausea and vomiting.  You may experience dizziness, extreme tiredness, and sleep problems.  You may experience temporary bladder control problems.  Nose bleeds and nasal stuffiness are common.     The fertilized egg travels down the fallopian tube and attaches to the uterus.    How pregnancy begins  Conception is the union of a sperm and an egg. When it happens, your baby’s genetic makeup is complete, even its sex. Fertilization takes place in the fallopian tube. The fertilized egg then travels down this tube to the uterus (womb). The egg attaches to the lining of the uterus about a week after conception. There it grows and is nourished.  [employer] last reviewed this educational content on 1/1/2018  © 1273-8873 [employer], [employer]. [address] [place] [address], [place] [address]. All rights reserved. This information is not intended as a substitute for professional medical care. Always follow your healthcare professional's instructions.        Pregnancy: Common Questions  There are plenty of myths and “old wives’ tales” surrounding pregnancy. You may need help  fact from fiction. On this sheet, you’ll find answers to a few common questions. If you have other questions, talk with your healthcare provider.     Will working harm  my baby?  In most cases, working throughout your pregnancy is not harmful at all. There may be concerns if the job involves dangerous machinery or chemicals, lifting, or standing for very long periods of time. Talk to your healthcare provider and employer about your particular job and pregnancy.   Is it safe to have sexual relations during pregnancy?  Yes, unless you are specifically instructed not to by your healthcare provider.  Why can’t I change the cat litter box?  Cats carry a disease called toxoplasmosis. In adult humans, it shows up as a mild infection of the blood and organs. If you are infected during pregnancy, the baby’s brain and eyes could be damaged. To be safe, have someone else change the litter. If you must handle it, wear a paper mask over your nose and mouth. Also, wear gloves and wash your hands afterward.   Which medicines are safe?  No prescription or over-the-counter medicine is safe for everyone all of the time. But sometimes medicines are needed.  Be sure your healthcare provider knows you are pregnant. Then use only the medicines he or she advises you to take.   Is it true that I can overheat my baby?  Yes. To avoid making your baby too warm:  Don’t sit in a Jacuzzi. A long, warm bath is fine, but not in water over 100°F (37.7°C).  Exercise less intensely if you feel fatigued. Base your workout on how you feel, not your heart rate. Heart rates aren’t a good way to measure effort during pregnancy.  Can I lift and carry safely?  Yes, if your healthcare provider doesn’t tell you otherwise. Learn to lift and carry safely to avoid injury and reduce back pain during pregnancy. To protect your back:   Bend at the knees to bring the load nearer.  Get a good . Test the weight of the load.  Tighten your belly. Exhale as you lift.  Lift with your legs, not with your back.  Carry the load close to your body.  Hold the load so you can see where you are going.  What if I get sick?  Most women get  sick at least once during pregnancy. Talk with your healthcare provider if you do. Most likely it will not affect your pregnancy. Get plenty of rest and fluids, and eat what you can. Talk to your healthcare provider before taking any medicines.   [employer] last reviewed this educational content on 10/1/2017  © 3090-0965 The [employer]. [address] [place] [address], [place] [address]. All rights reserved. This information is not intended as a substitute for professional medical care. Always follow your healthcare professional's instructions.        Pregnancy: More Common Questions  On this sheet, you’ll find answers to some common questions about pregnancy. If you have other questions, talk with your healthcare provider.    Will traveling be too stressful?  Not if you set the pace. When you plan a trip, allow time to stop and rest. You may even want to postpone travel until the second trimester, when your body is more adjusted to pregnancy. Don’t wait as long as the third trimester, because of the possibility of going into early labor. Travel to a location where healthcare facilities are close by. You may want to take a copy of your records with you in case you need medical care when you are traveling.  Can I still be a vegetarian?  Yes. Be sure to consult a registered dietitian. And be sure to get enough of the following:  Protein. Eat eggs and milk if you’re an ovo-lacto vegetarian. Eat vegetable proteins, like tofu and beans, if you’re a vegan.  Calcium. If you don’t eat dairy, try soy milk, soy cheese fortified with calcium, and orange juice fortified with calcium.  Vitamin B12. You may need to take a supplement that includes folic acid.  Vitamin D. If you don’t drink milk, ask about taking a supplement.  Iron. Your healthcare provider may recommend a supplement.  Can I paint my nails?  Yes. Nail polish is not thought to be dangerous during pregnancy. Just be careful about breathing the fumes. Keep windows  open or use a fan. However, long-term exposure to the solvents used to remove nail polish may not be safe. If you work in a nail salon, talk with your healthcare provider about safety concerns.  Should I eat more if I exercise?  You will only need an extra 100 calories for each 30 minutes of mild exercise. But you’ll also need more fluids. Drink at least an extra 8 ounces of water.  Do I have to stop jogging?  You can jog as long as you are comfortable. Many women find the impact or bounce of a jog doesn’t feel good. Some switch to brisk walking as their pregnancy advances.  What should I limit or avoid eating or drinking?  Don't drink unpasteurized milk products or juices.  Don't eat raw or undercooked meat, poultry, fish, or eggs.   Don't eat prepared meats, like hot dogs or deli meat, unless served steaming hot.  Don't drink alcohol.  Limit caffeine unless your healthcare provider tells you otherwise.    Can I lift weights?  If you have been lifting weights, there is no need to stop. Instead, keep the weights light and in control. Never hold your breath. If you haven’t been lifting weights, don’t start now.  [employer] last reviewed this educational content on 10/1/2017  © 5516-3465 The [employer], [employer]. [address] [place] [address], [place] [address]. All rights reserved. This information is not intended as a substitute for professional medical care. Always follow your healthcare professional's instructions.

## 2024-02-08 ENCOUNTER — LAB ENCOUNTER (OUTPATIENT)
Dept: LAB | Age: 29
End: 2024-02-08
Attending: ADVANCED PRACTICE MIDWIFE
Payer: COMMERCIAL

## 2024-02-08 DIAGNOSIS — R21 RASH AND NONSPECIFIC SKIN ERUPTION: ICD-10-CM

## 2024-02-08 LAB
ALT SERPL-CCNC: 11 U/L
AST SERPL-CCNC: 18 U/L (ref ?–34)
TSI SER-ACNC: 0.81 MIU/ML (ref 0.55–4.78)

## 2024-02-08 PROCEDURE — 82239 BILE ACIDS TOTAL: CPT

## 2024-02-08 PROCEDURE — 84443 ASSAY THYROID STIM HORMONE: CPT | Performed by: INTERNAL MEDICINE

## 2024-02-08 PROCEDURE — 84450 TRANSFERASE (AST) (SGOT): CPT | Performed by: INTERNAL MEDICINE

## 2024-02-08 PROCEDURE — 36415 COLL VENOUS BLD VENIPUNCTURE: CPT

## 2024-02-08 PROCEDURE — 84460 ALANINE AMINO (ALT) (SGPT): CPT | Performed by: INTERNAL MEDICINE

## 2024-02-10 LAB — BILE ACIDS: 2.4 UMOL/L

## 2024-02-12 ENCOUNTER — PATIENT MESSAGE (OUTPATIENT)
Dept: OBGYN CLINIC | Facility: CLINIC | Age: 29
End: 2024-02-12

## 2024-02-13 NOTE — TELEPHONE ENCOUNTER
From: Christy Cespedes  To: Adri Tapia  Sent: 2/12/2024 9:12 PM CST  Subject: Medicine for Congestion    Good evening Adri,    I came down with a cold over the weekend and I was wondering which medications are safe for me to take? I’m mostly tired, with a sore throat, and feel a lot of congestion. I was wondering if there’s a decongestant that I could take.     Thanks in advance,  Christy Cespedes

## 2024-02-14 ENCOUNTER — OFFICE VISIT (OUTPATIENT)
Dept: INTERNAL MEDICINE CLINIC | Facility: CLINIC | Age: 29
End: 2024-02-14
Payer: COMMERCIAL

## 2024-02-14 ENCOUNTER — LAB ENCOUNTER (OUTPATIENT)
Dept: LAB | Age: 29
End: 2024-02-14
Attending: INTERNAL MEDICINE
Payer: COMMERCIAL

## 2024-02-14 VITALS
WEIGHT: 157 LBS | SYSTOLIC BLOOD PRESSURE: 110 MMHG | HEART RATE: 81 BPM | BODY MASS INDEX: 24.35 KG/M2 | TEMPERATURE: 98 F | OXYGEN SATURATION: 99 % | DIASTOLIC BLOOD PRESSURE: 70 MMHG | HEIGHT: 67.5 IN

## 2024-02-14 DIAGNOSIS — R05.9 COUGH, UNSPECIFIED TYPE: ICD-10-CM

## 2024-02-14 DIAGNOSIS — Z3A.18 18 WEEKS GESTATION OF PREGNANCY: ICD-10-CM

## 2024-02-14 DIAGNOSIS — R50.9 FEVER, UNSPECIFIED FEVER CAUSE: ICD-10-CM

## 2024-02-14 DIAGNOSIS — J06.9 URTI (ACUTE UPPER RESPIRATORY INFECTION): Primary | ICD-10-CM

## 2024-02-14 DIAGNOSIS — J06.9 URTI (ACUTE UPPER RESPIRATORY INFECTION): ICD-10-CM

## 2024-02-14 LAB — BETA STREP GRP A SCREEN: NEGATIVE

## 2024-02-14 PROCEDURE — 87430 STREP A AG IA: CPT

## 2024-02-14 PROCEDURE — 87637 SARSCOV2&INF A&B&RSV AMP PRB: CPT

## 2024-02-14 PROCEDURE — 99213 OFFICE O/P EST LOW 20 MIN: CPT | Performed by: INTERNAL MEDICINE

## 2024-02-14 RX ORDER — ONDANSETRON 4 MG/1
4 TABLET, FILM COATED ORAL EVERY 8 HOURS PRN
Qty: 30 TABLET | Refills: 0 | Status: SHIPPED | OUTPATIENT
Start: 2024-02-14

## 2024-02-14 NOTE — PROGRESS NOTES
Christy Cespedes is a 28 year old female.    Chief complaint: URTI     HPI:     Christy Cespedes is a 28 year old pleasant female who presents for a   URTI   Started Saturday Sunday got worse   Sinus congestion   Sore throat   Had a fever was 99 on Sunday   Yesterday for a little bit   No chest pain   Some heaviness   Cough : productive of yellow green cough   Some body aches and fatigue       Took some tylenol with the fever   Has been taking zyrtec daily   She is feeling about the same       Current Outpatient Medications   Medication Sig Dispense Refill    ergocalciferol 1.25 MG (49194 UT) Oral Cap Take 1 capsule (50,000 Units total) by mouth once a week.      levothyroxine 50 MCG Oral Tab Take 1 tablet (50 mcg total) by mouth before breakfast. 90 tablet 0    levothyroxine 50 MCG Oral Tab Take one tab daily M-F , 2 tablets Saturdays and Sundays 105 tablet 3    ondansetron (ZOFRAN) 4 mg tablet Take 1 tablet (4 mg total) by mouth every 8 (eight) hours as needed for Nausea. 30 tablet 0    montelukast 10 MG Oral Tab Take 1 tablet (10 mg total) by mouth daily. 90 tablet 0    pyridoxine 50 MG Oral Tab Take 1 tablet (50 mg total) by mouth daily.      albuterol 108 (90 Base) MCG/ACT Inhalation Aero Soln Inhale 2 puffs into the lungs every 6 (six) hours as needed for Wheezing or Shortness of Breath. 18 g 0    ibuprofen 600 MG Oral Tab Take 1 tablet (600 mg total) by mouth every 6 (six) hours as needed for Pain. (Patient not taking: Reported on 11/28/2023) 30 tablet 0    prenatal vitamin w/DHA 27-0.8-228 MG Oral Cap Take 1 capsule by mouth daily.        Past Medical History:   Diagnosis Date    Anemia 11/21/2022    Anxiety     Depression     Hair loss 09/11/2023    Hypothyroidism 06/17/2014     Past Surgical History:   Procedure Laterality Date    TONSILLECTOMY               Family History   Problem Relation Age of Onset    Lipids Father     Ovarian Cancer Mother 42        BRCA neg    Other (Other) Mother          asthma     Prostate Cancer Maternal Grandfather     Breast Cancer Paternal Grandmother      Patient Active Problem List   Diagnosis    Hypothyroidism    History of delivery by vacuum extraction, currently pregnant    History of postpartum hemorrhage    Vitamin D deficiency    Menorrhagia with regular cycle    Ovarian cyst    Vaginal relaxation       REVIEW OF SYSTEMS:   A comprehensive 10 point review of systems was completed.  Pertinent positives and negatives noted in the the HPI            EXAM:   /70   Pulse 81   Temp 98 °F (36.7 °C)   Ht 5' 7.5\" (1.715 m)   Wt 157 lb (71.2 kg)   LMP 10/08/2023 (Exact Date)   SpO2 99%   BMI 24.23 kg/m²   GENERAL: well developed, well nourished,in no apparent distress  Ears : fluids behind the TM   Throat : no exudates   LUNGS: clear to auscultation  CARDIO: RRR without murmur               No orders of the defined types were placed in this encounter.    US OB 1st Trimester Abdominal <14 wks [03924]    Result Date: 1/19/2024  Comment Transvaginal images taken. Single intrauterine Gestation seen measuring 15w3d Fetal cardiac activity seen Early pregnancy status ultrasound Findings: Fetal heart rate measures 155 beats per minute via M-mode. Ultrasound estimated date of delivery 7/9/24. Impression: A single live intrauterine pregnancy is noted at the above gestational age.  Final estimated date of delivery is 7/14/24. This is by last menstrual period, and is consistent with this ultrasound. Recommendations: Follow up prenatal care with ANGELIQUE Pepper MD           ASSESSMENT AND PLAN:       ICD-10-CM    1. URTI (acute upper respiratory infection)  J06.9 SARS-CoV-2/Flu A and B/RSV by PCR ([facility]) [E] *Collect in Office!     Rapid Strep A Screen ([facility]) [E]      2. Fever, unspecified fever cause  R50.9 SARS-CoV-2/Flu A and B/RSV by PCR ([facility]) [E] *Collect in Office!     Rapid Strep A Screen ([facility]) [E]      3. Cough, unspecified type  R05.9 SARS-CoV-2/Flu A and B/RSV  by PCR ([facility]) [E] *Collect in Office!     Rapid Strep A Screen () [E]      4. 18 weeks gestation of pregnancy  Z3A.18 SARS-CoV-2/Flu A and B/RSV by PCR ([facility]) [E] *Collect in Office!     Rapid Strep A Screen ([facility]) [E]         Advised good hydration  Covid, rsv and flu test   Rapid strep   If negative testing and continues to have symptoms consider antibiotics   If worsening to go to the ER       Please return to the clinic if you are having persistent symptoms. If worsening symptoms should go to the ER    David Patel MD,   Diplomate of the American Board of Internal Medicine  Diplomate of the American Board of Obesity Medicine

## 2024-02-15 LAB
FLUAV + FLUBV RNA SPEC NAA+PROBE: NOT DETECTED
FLUAV + FLUBV RNA SPEC NAA+PROBE: NOT DETECTED
RSV RNA SPEC NAA+PROBE: NOT DETECTED
SARS-COV-2 RNA RESP QL NAA+PROBE: NOT DETECTED

## 2024-02-16 RX ORDER — AZITHROMYCIN 250 MG/1
TABLET, FILM COATED ORAL
Qty: 6 TABLET | Refills: 0 | Status: SHIPPED | OUTPATIENT
Start: 2024-02-16 | End: 2024-02-20

## 2024-03-04 ENCOUNTER — ROUTINE PRENATAL (OUTPATIENT)
Dept: OBGYN CLINIC | Facility: CLINIC | Age: 29
End: 2024-03-04

## 2024-03-04 VITALS
DIASTOLIC BLOOD PRESSURE: 69 MMHG | WEIGHT: 163 LBS | SYSTOLIC BLOOD PRESSURE: 112 MMHG | BODY MASS INDEX: 25 KG/M2 | HEART RATE: 88 BPM

## 2024-03-04 DIAGNOSIS — O22.00 VARICOSE VEINS DURING PREGNANCY (HCC): ICD-10-CM

## 2024-03-04 DIAGNOSIS — Z34.82 ENCOUNTER FOR SUPERVISION OF OTHER NORMAL PREGNANCY IN SECOND TRIMESTER (HCC): Primary | ICD-10-CM

## 2024-03-04 NOTE — PROGRESS NOTES
Patient presents for routine OB visit. Overall, feeling well. Patient reports active fetus. Denies loss of fluid, vaginal bleeding, and contractions.    Patient reports varicose veins on left leg behind knee. Reviewed treatment for varicosities including elevation, compression stockings, ice, avoiding sitting for long periods of times and crossing legs. Patient to notify if varicosities become hard, warm, or inflamed as this could warrant further evaluation via doppler studies. Patient verbalizes understanding    Patient planning a trip to [place]. Reviewed traveling in pregnancy and the importance of staying hydrated, moving and walking while on the plane.     Patient continues to have rash on arms, chest and neck. Rash has not gotten worse and plans to have it evaluated at dermatology apt. Patient has appointment with dermatologist in a couple weeks.    Patient to restart taking PNV. Stopped briefly to see if this was causing rash but realized it was causing her to be nauseous. Discussed taking PNV at night or cutting pill and taking it at two separate times during the day to help with nausea.    Ultrasound: has Anatomy scan on Friday. Measuring large for dates today      Reviewed  labor precautions  Reviewed warning signs    RTC 4 weeks    Assessment and Plan completed by YESY Capps under the direct supervision of Ana Williamson CNM

## 2024-03-05 ENCOUNTER — TELEPHONE (OUTPATIENT)
Dept: PHYSICAL THERAPY | Facility: HOSPITAL | Age: 29
End: 2024-03-05

## 2024-03-05 NOTE — PROGRESS NOTES
Patient seen in conjunction with [facility] under my direct supervision. I was present for history, physical, assessment and plan. I agree with documentation per [facility]N.

## 2024-03-08 ENCOUNTER — ULTRASOUND ENCOUNTER (OUTPATIENT)
Dept: OBGYN CLINIC | Facility: CLINIC | Age: 29
End: 2024-03-08
Payer: COMMERCIAL

## 2024-03-08 DIAGNOSIS — Z34.82 PRENATAL CARE, SUBSEQUENT PREGNANCY IN SECOND TRIMESTER (HCC): ICD-10-CM

## 2024-03-08 PROCEDURE — 76817 TRANSVAGINAL US OBSTETRIC: CPT | Performed by: OBSTETRICS & GYNECOLOGY

## 2024-03-08 PROCEDURE — 76805 OB US >/= 14 WKS SNGL FETUS: CPT | Performed by: OBSTETRICS & GYNECOLOGY

## 2024-03-19 ENCOUNTER — OFFICE VISIT (OUTPATIENT)
Dept: DERMATOLOGY CLINIC | Facility: CLINIC | Age: 29
End: 2024-03-19
Payer: COMMERCIAL

## 2024-03-19 DIAGNOSIS — L30.9 ECZEMA, UNSPECIFIED TYPE: ICD-10-CM

## 2024-03-19 DIAGNOSIS — D48.5 NEOPLASM OF UNCERTAIN BEHAVIOR OF SKIN: Primary | ICD-10-CM

## 2024-03-19 PROCEDURE — 11102 TANGNTL BX SKIN SINGLE LES: CPT | Performed by: STUDENT IN AN ORGANIZED HEALTH CARE EDUCATION/TRAINING PROGRAM

## 2024-03-19 PROCEDURE — 88305 TISSUE EXAM BY PATHOLOGIST: CPT | Performed by: STUDENT IN AN ORGANIZED HEALTH CARE EDUCATION/TRAINING PROGRAM

## 2024-03-19 PROCEDURE — 99204 OFFICE O/P NEW MOD 45 MIN: CPT | Performed by: STUDENT IN AN ORGANIZED HEALTH CARE EDUCATION/TRAINING PROGRAM

## 2024-03-19 RX ORDER — TRIAMCINOLONE ACETONIDE 1 MG/G
1 CREAM TOPICAL 2 TIMES DAILY
Qty: 454 G | Refills: 0 | Status: SHIPPED | OUTPATIENT
Start: 2024-03-19

## 2024-03-19 RX ORDER — ONDANSETRON 4 MG/1
4 TABLET, FILM COATED ORAL EVERY 8 HOURS PRN
Qty: 30 TABLET | Refills: 0 | Status: SHIPPED | OUTPATIENT
Start: 2024-03-19

## 2024-03-19 NOTE — PROGRESS NOTES
March 19, 2024    New patient     Referred by:   No referring provider defined for this encounter.      CHIEF COMPLAINT: Rash     HISTORY OF PRESENT ILLNESS: Christy Cespedes is a 29 year old female here for evaluation of rash.      Location: Upper body   Duration: 2 months   Signs and symptoms: Itchy, some breakouts  Current treatment: OTC topicals   Past treatments: Bio enzyme test    Personal Dermatologic History  History of chronic skin disease: Yes, Eczema     Family History  History of chronic skin disease: Yes, Eczema  History autoimmune diseases:  No    Past Medical History  Past Medical History:   Diagnosis Date    Anemia 11/21/2022    Anxiety     Depression     Hair loss 09/11/2023    Hypothyroidism 06/17/2014       REVIEW OF SYSTEMS:  Constitutional: Denies fever, chills, unintentional weight loss.   Skin as per HPI    Medications  Current Outpatient Medications   Medication Sig Dispense Refill    ondansetron (ZOFRAN) 4 mg tablet Take 1 tablet (4 mg total) by mouth every 8 (eight) hours as needed for Nausea. 30 tablet 0    ergocalciferol 1.25 MG (23141 UT) Oral Cap Take 1 capsule (50,000 Units total) by mouth once a week.      levothyroxine 50 MCG Oral Tab Take 1 tablet (50 mcg total) by mouth before breakfast. 90 tablet 0    levothyroxine 50 MCG Oral Tab Take one tab daily M-F , 2 tablets Saturdays and Sundays 105 tablet 3    montelukast 10 MG Oral Tab Take 1 tablet (10 mg total) by mouth daily. 90 tablet 0    pyridoxine 50 MG Oral Tab Take 1 tablet (50 mg total) by mouth daily.      albuterol 108 (90 Base) MCG/ACT Inhalation Aero Soln Inhale 2 puffs into the lungs every 6 (six) hours as needed for Wheezing or Shortness of Breath. 18 g 0    ibuprofen 600 MG Oral Tab Take 1 tablet (600 mg total) by mouth every 6 (six) hours as needed for Pain. (Patient not taking: Reported on 11/28/2023) 30 tablet 0    prenatal vitamin w/DHA 27-0.8-228 MG Oral Cap Take 1 capsule by mouth daily.         PHYSICAL  EXAM:  General: awake, alert, no acute distress  Skin: Skin exam was performed today including the following: extremities and trunk. Pertinent findings include:   - with pink scaly papule  - scalp with skin colored plaque    ASSESSMENT & PLAN:  Pathophysiology of diagnoses discussed with patient.  Therapeutic options reviewed. Risks, benefits, and alternatives discussed with patient. Instructions reviewed at length.    #Eczematous Dermatitis in setting of pregnancy  - Triamcinolone 0.1% twice daily to affected areas Monday-Friday with flares of eczema. Take weekends off. Avoid use on face, breasts, groin, or axiillae.     #Neoplasm(s) of uncertain behavior of skin  - Shave biopsy performed today   - Will notify patient with results and arrange for appropriate definitive treatment, if indicated.      Shave of lesion to establish and confirm diagnosis:  Photo taken: Yes    Risks, benefits, alternatives and personnel required for shave biopsy reviewed with patient. Risks discussed include, but not limited to: pain, bleeding, infection, scar, reaction to anesthetic, and recurrence/need for further treatment.  Patient and physician agree as to site(s) to be biopsied. Patient verbalizes understanding and wishes to proceed.     Site(s) prepped with alcohol and anesthetized with 1% lidocaine with epinephrine.   Shave of lesion(s) performed to the level of the dermis. Specimen(s) from A. scalp  sent for pathology to r/o Nevus 50% ALCL and bandaging applied.   Written and verbal wound care instructions provided to patient, understanding verbalized.      Return to clinic: 4 weeks or sooner if something concerning arises    Jae Henry MD

## 2024-03-25 RX ORDER — MONTELUKAST SODIUM 10 MG/1
10 TABLET ORAL DAILY
Qty: 90 TABLET | Refills: 0 | Status: SHIPPED | OUTPATIENT
Start: 2024-03-25

## 2024-04-01 ENCOUNTER — ROUTINE PRENATAL (OUTPATIENT)
Dept: OBGYN CLINIC | Facility: CLINIC | Age: 29
End: 2024-04-01

## 2024-04-01 VITALS
HEART RATE: 82 BPM | DIASTOLIC BLOOD PRESSURE: 72 MMHG | SYSTOLIC BLOOD PRESSURE: 112 MMHG | WEIGHT: 173 LBS | BODY MASS INDEX: 27 KG/M2

## 2024-04-01 DIAGNOSIS — Z34.90 FUNDAL HEIGHT HIGH FOR DATES (HCC): ICD-10-CM

## 2024-04-01 DIAGNOSIS — Z87.59 HISTORY OF POLYHYDRAMNIOS: ICD-10-CM

## 2024-04-01 DIAGNOSIS — Z34.92 PRENATAL CARE, SECOND TRIMESTER (HCC): Primary | ICD-10-CM

## 2024-04-01 NOTE — PROGRESS NOTES
Feeling well. Endorses regular fetal movement. Denies contractions, LOF, vaginal bleeding.     Fundal height high for dates. Ultrasound ordered    Plan:  1Hr GTT and CBC ordered. Patient instructed to complete  BRICE 3 weeks    Reviewed third trimester warning signs and when to call.

## 2024-04-10 LAB
GLUCOSE, GESTATIONAL SCREEN (50G)-130 CUTOFF: 87 MG/DL
HEMATOCRIT: 33.6 % (ref 35–45)
HEMOGLOBIN: 10.8 G/DL (ref 11.7–15.5)
MCH: 29.6 PG (ref 27–33)
MCHC: 32.1 G/DL (ref 32–36)
MCV: 92.1 FL (ref 80–100)
MPV: 9.3 FL (ref 7.5–12.5)
PLATELET COUNT: 232 THOUSAND/UL (ref 140–400)
RDW: 12.4 % (ref 11–15)
RED BLOOD CELL COUNT: 3.65 MILLION/UL (ref 3.8–5.1)
WHITE BLOOD CELL COUNT: 7.7 THOUSAND/UL (ref 3.8–10.8)

## 2024-04-10 NOTE — TELEPHONE ENCOUNTER
A refill request was received for:  Requested Prescriptions     Pending Prescriptions Disp Refills    ERGOCALCIFEROL 1.25 MG (31521 UT) Oral Cap [Pharmacy Med Name: VITAMIN D2 50,000IU (ERGO) CAP RX] 12 capsule 0     Sig: TAKE 1 CAPSULE BY MOUTH 1 TIME A WEEK FOR 12 DOSES     Last refill date:  1/17/24    Last office visit: 2/14/24      Future Appointments   Date Time Provider Department Center   4/18/2024  2:00 PM Jae Henry MD [facility]   4/23/2024  1:00 PM Gabriela Aviles CNM [id number]   4/26/2024 11:30 AM [facility] 10 CF ULTRASOUND [facility] 10 CF  [facility] 10 CF   5/7/2024 11:45 AM Devika Macedo CNM [id number]   5/21/2024 10:30 AM Charlene Canada CNM [facility]

## 2024-04-11 RX ORDER — ERGOCALCIFEROL 1.25 MG/1
50000 CAPSULE ORAL WEEKLY
Qty: 12 CAPSULE | Refills: 0 | Status: SHIPPED | OUTPATIENT
Start: 2024-04-11

## 2024-04-13 RX ORDER — FERROUS SULFATE 325(65) MG
325 TABLET ORAL EVERY OTHER DAY
Qty: 30 TABLET | Refills: 3 | Status: SHIPPED | OUTPATIENT
Start: 2024-04-13

## 2024-04-15 ENCOUNTER — TELEPHONE (OUTPATIENT)
Dept: OBGYN CLINIC | Facility: CLINIC | Age: 29
End: 2024-04-15

## 2024-04-15 NOTE — TELEPHONE ENCOUNTER
Name and  verified    Patient states she is already taking iron (325 mg ferrous sulfate) through pregnancy. Encouraged to continue.   Forwarding to provider. Will call patient back if any changes to plan of care.

## 2024-04-15 NOTE — TELEPHONE ENCOUNTER
----- Message from Indira Hawk CNM sent at 4/13/2024  3:25 PM CDT -----  Please call patient. She is anemic. I have sent iron to her pharmacy. Glucose screen was normal. Thanks!

## 2024-04-18 ENCOUNTER — OFFICE VISIT (OUTPATIENT)
Dept: DERMATOLOGY CLINIC | Facility: CLINIC | Age: 29
End: 2024-04-18
Payer: COMMERCIAL

## 2024-04-18 DIAGNOSIS — L30.9 ECZEMA, UNSPECIFIED TYPE: Primary | ICD-10-CM

## 2024-04-18 DIAGNOSIS — L73.9 FOLLICULITIS: ICD-10-CM

## 2024-04-18 PROCEDURE — 99214 OFFICE O/P EST MOD 30 MIN: CPT | Performed by: STUDENT IN AN ORGANIZED HEALTH CARE EDUCATION/TRAINING PROGRAM

## 2024-04-18 RX ORDER — CLINDAMYCIN PHOSPHATE 10 UG/ML
LOTION TOPICAL
Qty: 60 ML | Refills: 11 | Status: SHIPPED | OUTPATIENT
Start: 2024-04-18

## 2024-04-18 NOTE — PROGRESS NOTES
April 18, 2024    Established patient     CHIEF COMPLAINT: Rash F/U     HISTORY OF PRESENT ILLNESS: .    1. Rash F/U   Location: Upper Body   Duration: 2 months   Signs and symptoms: Some Improvement  Current treatment: Triamcinolone 0.1%  Past treatments: OTC Topicals, Bio Enzyme Test    2. Acne   Location: Chest, Shoulders, B/L Arms, Back, neck, and face   Duration: Past couple weeks   Current Treatment: None   Past treatment: None       Personal Dermatologic History  History of chronic skin disease: Yes, Eczema  Family History  History of chronic skin disease: Yes, Eczema  History autoimmune diseases:  No    History/Other:    REVIEW OF SYSTEMS:  Constitutional: Denies fever, chills, unintentional weight loss.   Skin as per HPI    PAST MEDICAL HISTORY:  Past Medical History:    Anemia    Anxiety    Depression    Hair loss    Hypothyroidism       Medications  Current Outpatient Medications   Medication Sig Dispense Refill    Ferrous Sulfate 325 (65 Fe) MG Oral Tab Take 1 tablet (325 mg total) by mouth every other day. 30 tablet 3    ergocalciferol 1.25 MG (12077 UT) Oral Cap Take 1 capsule (50,000 Units total) by mouth once a week. 12 capsule 0    montelukast 10 MG Oral Tab Take 1 tablet (10 mg total) by mouth daily. 90 tablet 0    ondansetron (ZOFRAN) 4 mg tablet Take 1 tablet (4 mg total) by mouth every 8 (eight) hours as needed for Nausea. 30 tablet 0    triamcinolone 0.1 % External Cream Apply 1 Application topically 2 (two) times daily. Use for 2 weeks and then decrease to using up to bid Monday-Friday only with flares 454 g 0    levothyroxine 50 MCG Oral Tab Take 1 tablet (50 mcg total) by mouth before breakfast. 90 tablet 0    levothyroxine 50 MCG Oral Tab Take one tab daily M-F , 2 tablets Saturdays and Sundays 105 tablet 3    pyridoxine 50 MG Oral Tab Take 1 tablet (50 mg total) by mouth daily.      albuterol 108 (90 Base) MCG/ACT Inhalation Aero Soln Inhale 2 puffs into the lungs every 6 (six) hours as  needed for Wheezing or Shortness of Breath. 18 g 0    ibuprofen 600 MG Oral Tab Take 1 tablet (600 mg total) by mouth every 6 (six) hours as needed for Pain. (Patient not taking: Reported on 11/28/2023) 30 tablet 0    prenatal vitamin w/DHA 27-0.8-228 MG Oral Cap Take 1 capsule by mouth daily.         Objective:    PHYSICAL EXAM:  General: awake, alert, no acute distress  Skin: Skin exam was performed today including the following: trunk. Pertinent findings include:   - with numerous pustules    ASSESSMENT & PLAN:  Pathophysiology of diagnoses discussed with patient.  Therapeutic options reviewed. Risks, benefits, and alternatives discussed with patient. Instructions reviewed at length.    #Eczematous Dermatitis  - Triamcinolone 0.1% twice daily to affected areas Monday-Friday with flares of eczema. Take weekends off. Avoid use on face, breasts, groin, or axiillae.     #Folliculitis  - Apply clindamycin 1% lotion twice daily to affected areas with flares.    Return to clinic: as needed or sooner if something concerning arises     Jae Henry MD

## 2024-04-23 ENCOUNTER — ROUTINE PRENATAL (OUTPATIENT)
Dept: OBGYN CLINIC | Facility: CLINIC | Age: 29
End: 2024-04-23

## 2024-04-23 VITALS
HEART RATE: 85 BPM | BODY MASS INDEX: 27 KG/M2 | DIASTOLIC BLOOD PRESSURE: 60 MMHG | SYSTOLIC BLOOD PRESSURE: 117 MMHG | WEIGHT: 174 LBS

## 2024-04-23 DIAGNOSIS — Z34.83 ENCOUNTER FOR SUPERVISION OF OTHER NORMAL PREGNANCY IN THIRD TRIMESTER (HCC): Primary | ICD-10-CM

## 2024-04-23 PROCEDURE — 90471 IMMUNIZATION ADMIN: CPT | Performed by: ADVANCED PRACTICE MIDWIFE

## 2024-04-23 PROCEDURE — 90715 TDAP VACCINE 7 YRS/> IM: CPT | Performed by: ADVANCED PRACTICE MIDWIFE

## 2024-04-23 NOTE — PROGRESS NOTES
Christy, , is at 28w2d, here for her return OB visit.  Currently, she is feeling well. Denies contractions, VB or LOF. Endorses active fetus.  Pelvic PT has been really helpful. Derm gave her a cream for her rash which has been helping.      Vital signs and weight reviewed  See flowsheets     Assessment/Plan:   S>D: has growth US scheduled for . Plan another growth US at 32w to monitor amniotic fluid.  Tdap recommendation reviewed and pt accepts  3T HIV & RPR ordered today and pt encouraged to complete prior to 32w    Next visit: 2 weeks    Reviewed:   Prenatal visit schedule  Recommendations and rationale for Tdap vaccine(s) in pregnancy  3rd trimester precautions and expectations   labor precautions  Kick counts  Danger signs      I have spent 20 minutes total time on the day of the encounter, including: preparing to see the patient, ordering/reviewing labs, performing a medically appropriate exam, and providing care coordination. face to face counseling, chart review, orders and coordination of care    Pt verbalized understanding. All questions answered. No barriers to learning identified

## 2024-04-23 NOTE — PATIENT INSTRUCTIONS
Understanding  Labor  Going into labor before your 37th week of pregnancy is called  labor.  labor can cause your baby to be born too soon. This can lead to a number of health problems that may affect your baby.      Before labor, the cervix is thick and closed.      In  labor, the cervix begins to efface (thin) and dilate (open).   Symptoms of  labor   If you think you’re having  labor, get medical help right away. Contractions alone don’t mean you’re in  labor. What matters more are changes in your cervix (the lower end of the uterus). Symptoms of  labor include:   Four or more contractions per hour  Strong contractions  Constant menstrual-like cramping  Low-back pain  Mucous or bloody vaginal discharge  Bleeding or spotting in the second or third trimester  Evaluating  labor   Your healthcare provider will try to find out whether you’re in  labor or whether you’re just having contractions. He or she may watch you for a few hours. The following tests may be done:   Pelvic exam to see if your cervix has effaced (thinned) and dilated (opened)  Uterine activity monitoring to detect contractions  Fetal monitoring to check the health of your baby  Ultrasound to check your baby’s size and position  Amniocentesis to check how mature your baby’s lungs are  Caring for yourself at home   If you have  contractions, but your cervix is still thick and closed, your healthcare provider may ask you to do the following at home:   Drink plenty of water.  Do fewer activities.  Rest in bed on your side.  Don't have intercourse or nipple stimulation.  When to call your healthcare provider   Call your healthcare provider if you notice any of these:   Four or more contractions per hour  Bag of water breaks  Bleeding or spotting  If you need hospital care    labor often requires that you have hospital care and complete bed rest. You may have an IV  (intravenous) line to get fluids. You may be given pills or injections to help prevent contractions. Finally, you may get medicine (corticosteroids) that helps your baby’s lungs mature more quickly.   Are you at risk?   Any pregnant woman can have  labor. It may start for no reason. But these risk factors can increase your chances:   Past  labor or past early birth  Smoking, drug, or alcohol use during pregnancy  Multiple fetuses (twins or more)  Problems with the shape of the uterus  Bleeding during the pregnancy  The dangers of  birth   A baby born too soon may have health problems. This is because the baby didn’t have enough time to mature. Some of the risks for your baby include:   Not breastfeeding or feeding well  Having immature lungs  Bleeding in the brain  Dying  Reaching term   Your goal is to get as close to term as you can before giving birth. The closer you get to term, the higher your chance of having a healthy baby. Work with your healthcare provider. Together, you can take steps that may keep you from giving birth too early.   [employer] last reviewed this educational content on 3/1/2019  © 3215-4714 [employer]. [address] [place] [address]. All rights reserved. This information is not intended as a substitute for professional medical care. Always follow your healthcare professional's instructions.        Premature Labor    Premature labor ( labor) is when symptoms of labor occur before 37 weeks of pregnancy. (This is 3 weeks before your due date.) Premature labor can lead to premature delivery. This means giving birth to your baby early. Babies need at least 37 weeks of pregnancy for all the organs to develop normally. The earlier the delivery, the greater the risks to the baby.  In most cases, the cause of premature labor is unknown. But certain factors may make the problem more likely. These include:  History of premature labor with other  pregnancies  Smoking  Alcohol or substance abuse  Low pre-pregnancy weight or weight gain during pregnancy  Short time period between pregnancies  Being pregnant with twins, triplets, or more  History of certain types of surgery on the cervix or uterus  Having a short cervix  Certain infections  There are a number of other risk factors. Ask your healthcare provider to help you understand the risk factors specific to your case. Then find out what you can do to control or reduce them.  Contractions are one of the main signs of premature labor. A contraction is different from cramping. It may feel painful and the belly (abdomen) may get hard. It can last from a few seconds to a few minutes. Some women may feel only a sense of pressure in the belly, thighs, rectum, or vagina. Some may feel only the hardening of the uterus without pain or pressure. Or there may be a constant pain in the lower back, which spreads forward toward the belly.Premature labor is often treated with medicines. A hospital stay may be needed. If labor doesn't progress and you and your baby are both healthy, you may be discharged to continue care at home.  Home care  Ask your provider any questions you have. Be certain you understand how to care for yourself at home. Also follow all recommendations given by your healthcare providers.  Learn the signs of premature labor. Watch for these signs when you get home.  Limit or restrict activities as advised. This may include stopping certain physical activities and cutting back hours at work.  Avoid doing strenuous work as directed by your provider. Ask family and friends for help with tasks and support at home, if needed.  Don’t smoke, drink alcohol, or use other harmful substances.  Take steps to reduce stress.  Report any unusual symptoms to your provider.    Follow-up care  Follow up with your healthcare provider, or as directed. Weekly visits with your provider may be needed.  When to seek medical  advice  Call your healthcare provider right away if any of these occur:  Regular or frequent contractions, whether they are painful or not  Pressure in the pelvis  Pressure in the lower belly or mild cramping in your belly with or without diarrhea  Constant low, dull backache  Gush or slow leaking of water from your vagina  Change in vaginal discharge (watery, mucus, or bloody)  Any vaginal bleeding  Decreased movement of your baby  [employer] last reviewed this educational content on 6/1/2018 © 2000-2020 [employer]. [address] [place] [address]. All rights reserved. This information is not intended as a substitute for professional medical care. Always follow your healthcare professional's instructions.        Understanding Preeclampsia  Preeclampsia is high blood pressure (hypertension) that happens during pregnancy. It often shows up around the 20th week of pregnancy. It often goes back to normal by the 12th week after you give birth. It can lead to serious health risks for you and your baby. During your pregnancy, your healthcare provider will watch your blood pressure.    Symptoms  A common symptom of preeclampsia is high blood pressure. Other symptoms may include:  Rapid weight gain  Protein in your urine  Headache  Belly (abdominal) pain on your right side  Vision problems. These include flashes or spots.  Swelling (edema) in your face or hands. This also often happens near the end of normal pregnancies, even without preeclampsia.  Tests you may have  Your healthcare provider will want to check your blood pressure throughout your pregnancy. If your blood pressure is high, you may have the following tests:  Urine tests to look for protein  Blood tests to confirm preeclampsia  Fetal monitoring to make sure that your baby is healthy  Treating preeclampsia  You may need to take a daily low dose of aspirin if you are at risk for preeclampsia. Preeclampsia almost always ends soon after you  give birth. Until then, your healthcare provider can help manage your condition. If your symptoms are mild, you may need activity limits at home, including bed rest and no heavy lifting. If your symptoms are severe, you will stay in the hospital. Hospital treatment includes:  Activity limits to help control blood pressure. This means no heavy lifting and 8 hours per day lying down with the feet up.  Magnesium IV (intravenous) drip during labor to prevent seizures  Induced labor or surgical delivery by  section. Delivery is considered the cure for preeclampsia.  When to call your healthcare provider  Call your healthcare provider if swelling, weight gain, or other symptoms come on quickly or are severe. Some cases of preeclampsia are more severe than others. Your symptoms also may change or get worse as you get closer to your due date.  Who’s at risk?  No one knows what causes preeclampsia. Preeclampsia can happen in any pregnant woman. But it is more common in first-time pregnancies. Things that increase the risk include:  Previous pregnancies. You are at risk if you had preeclampsia, intrauterine growth retardation (IUGR),  birth, placental abruption, or fetal death in a past pregnancy.  Health history of mother. You are at risk if you have diabetes, high blood pressure, obesity, kidney disease, autoimmune disease such as lupus, or a family history of preeclampsia.  Current pregnancy. You are at risk if this is your first pregnancy, or if you have multiple fetuses, are younger than age 18 or older than 40, or used in vitro fertilization.  Race. You are at risk if you are black.  Dangers of preeclampsia  If not treated, preeclampsia can cause problems for you and your baby. The placenta is the organ that nourishes your baby. It may tear away from the uterine wall. This can put the baby at risk for health problems (fetal distress) and premature birth. Preeclampsia can also cause these health  problems:  Kidney failure or other organ damage  Seizures  Stroke  Once you give birth  In most cases, preeclampsia goes away on its own soon after you give birth. This is often by the 12th week after you give deliver. Within days of delivery, your blood pressure, swelling, and other symptoms should get better. For some women, problems from preeclampsia can continue after delivery.  Postpartum preeclampsia that develops within the first 48 hours after delivery is rare. Another type of postpartum preeclampsia that develops more than 48 hours after delivery is called late-onset preeclampsia. It is also rare. Contact your healthcare provider right away if you have symptoms of preeclampsia after you deliver.  [employer] last reviewed this educational content on 12/1/2019 © 2000-2020 [employer]. [address] [place] [address], [place] [address]. All rights reserved. This information is not intended as a substitute for professional medical care. Always follow your healthcare professional's instructions.        Kick Counts    It’s normal to worry about your baby’s health. One way you can know your baby’s doing well is to record the baby’s movements once a day. This is called a kick count. Remember to take your kick count records to all your appointments with your healthcare provider.  How to count kicks  Time how long it takes you to feel 10 kicks, flutters, swishes, or rolls. Ideally, you want to feel at least 10 movements within 2 hours. You will likely feel 10 movements in less time than that.  Starting at 28 weeks, count your baby's movements daily. Follow your healthcare provider's instructions for kick counting. Here are tips for counting kicks:  Choose a time when the baby is active, such as after a meal.   Sit comfortably or lie on your side.   The first time the baby moves, write down the time.   Count each movement until the baby has moved 10 times. This can take from 20 minutes to 2 hours.   If you have  not felt 10 kicks by the end of the second hour, wait a few hours. Then try again.  Try to do it at the same time each day.  When to call your healthcare provider  Call your healthcare provider right away if:  You do a couple sets of kick counts during the day and your baby moves fewer than 10 times in 2 hours  Your baby moves much less often than on the days before.  You have not felt your baby move all day.  [employer] last reviewed this educational content on 12/1/2017 © 2000-2020 [employer]. [address] [place] [address]. All rights reserved. This information is not intended as a substitute for professional medical care. Always follow your healthcare professional's instructions.

## 2024-04-26 ENCOUNTER — ULTRASOUND ENCOUNTER (OUTPATIENT)
Dept: OBGYN CLINIC | Facility: CLINIC | Age: 29
End: 2024-04-26
Payer: COMMERCIAL

## 2024-04-26 DIAGNOSIS — Z34.90 FUNDAL HEIGHT HIGH FOR DATES (HCC): ICD-10-CM

## 2024-04-26 DIAGNOSIS — Z87.59 HISTORY OF POLYHYDRAMNIOS: ICD-10-CM

## 2024-05-03 PROBLEM — O40.3XX0 POLYHYDRAMNIOS AFFECTING PREGNANCY IN THIRD TRIMESTER (HCC): Status: ACTIVE | Noted: 2024-05-03

## 2024-05-07 ENCOUNTER — ROUTINE PRENATAL (OUTPATIENT)
Dept: OBGYN CLINIC | Facility: CLINIC | Age: 29
End: 2024-05-07
Payer: COMMERCIAL

## 2024-05-07 VITALS
WEIGHT: 176 LBS | SYSTOLIC BLOOD PRESSURE: 112 MMHG | HEART RATE: 96 BPM | BODY MASS INDEX: 27 KG/M2 | DIASTOLIC BLOOD PRESSURE: 69 MMHG

## 2024-05-07 DIAGNOSIS — O40.9XX0 POLYHYDRAMNIOS, ANTEPARTUM, SINGLE OR UNSPECIFIED FETUS (HCC): ICD-10-CM

## 2024-05-07 DIAGNOSIS — Z34.93 PRENATAL CARE IN THIRD TRIMESTER (HCC): Primary | ICD-10-CM

## 2024-05-07 RX ORDER — CETIRIZINE HYDROCHLORIDE 10 MG/1
10 TABLET ORAL DAILY
COMMUNITY

## 2024-05-07 NOTE — PATIENT INSTRUCTIONS
Kick Counts  It’s normal to worry about your baby’s health. One way you can know your baby’s doing well is to record the baby’s movements once a day. This is called a kick count.   Remember to take your kick count records to all your appointments with your healthcare provider.  How to count kicks    Time how long it takes you to feel 10 kicks, flutters, swishes, or rolls. Ideally, you want to feel at least 10 movements in 2 hours. You will likely feel 10 movements in less time than that.  Starting at 28 weeks, count your baby's movements daily. Follow your healthcare provider's instructions for kick counting. Here are tips for counting kicks:  Choose a time when the baby is active, such as after a meal.   Sit comfortably or lie on your side.   The first time the baby moves, write down the time.   Count each movement until the baby has moved  10 times. This can take from 20 minutes to 2 hours.   If you haven't felt 10 kicks by the end of the second hour, wait a few hours. Then try again.  Try to do it at the same time each day.  When to call your healthcare provider  Call your healthcare provider  right away if:  You do a couple sets of kick counts during the day and your baby moves fewer than 10 times in 2 hours.  Your baby moves much less often than on the days before.  You haven't felt your baby move all day.  [employer] last reviewed this educational content on 2020    © 8148-0275 [employer]. All rights reserved. This information is not intended as a substitute for professional medical care. Always follow your healthcare professional's instructions. Premature Labor    Premature labor ( labor) is when symptoms of labor occur before 37 weeks of pregnancy. (This is 3 weeks before your due date.) Premature labor can lead to premature delivery. This means giving birth to your baby early. Babies need at least 37 weeks of pregnancy for all the organs to develop normally. The earlier the  Hematology follow-up note    38-year-old woman with an ischemic stroke of unclear etiology.  She has had 2 borderline low protein C levels.  However her protein C functional assay returned normal.  Thus I do not think that she has a protein C deficiency.    In terms of other thrombophilias, she had negative lupus anticoagulant testing and negative beta-2 glycoprotein and anticardiolipin antibody levels, all drawn off of anticoagulation which makes them valid test results.  She also tested negative for the prothrombin and factor V Leiden gene mutations, and she had normal protein S and Antithrombin levels.      At this point the only remaining testing that I can think of to explain her stroke would be to evaluate for an underlying myeloproliferative neoplasm or paroxysmal nocturnal hemoglobinuria.  She does not have symptoms or cytopenias to suggest either of these conditions.  However this was an extreme event to occur in such a young woman, and the only other potential causes I can see would be the use of tranexamic acid, which is not typically thought to cause thrombosis.  As such we will send for these tests to rule out everything that we can that might be actionable.    Jacob Cogan, MD  Hematology   delivery, the greater the risks to the baby.  In most cases, the cause of premature labor is unknown. But certain factors may make the problem more likely. These include:  History of premature labor with other pregnancies  Smoking  Alcohol or substance abuse  Low pre-pregnancy weight or weight gain during pregnancy  Short time period between pregnancies  Being pregnant with twins, triplets, or more  History of certain types of surgery on the cervix or uterus  Having a short cervix  Certain infections  There are a number of other risk factors. Ask your healthcare provider to help you understand the risk factors specific to your case. Then find out what you can do to control or reduce them.  Contractions are one of the main signs of premature labor. A contraction is different from cramping. It may feel painful and the belly (abdomen) may get hard. It can last from a few seconds to a few minutes. Some women may feel only a sense of pressure in the belly, thighs, rectum, or vagina. Some may feel only the hardening of the uterus without pain or pressure. Or there may be a constant pain in the lower back, which spreads forward toward the belly.Premature labor is often treated with medicines. A hospital stay may be needed. If labor doesn't progress and you and your baby are both healthy, you may be discharged to continue care at home.  Home care  Ask your provider any questions you have. Be certain you understand how to care for yourself at home. Also follow all recommendations given by your healthcare providers.  Learn the signs of premature labor. Watch for these signs when you get home.  Limit or restrict activities as advised. This may include stopping certain physical activities and cutting back hours at work.  Avoid doing strenuous work as directed by your provider. Ask family and friends for help with tasks and support at home, if needed.  Don’t smoke, drink alcohol, or use other harmful substances.  Take steps to  reduce stress.  Report any unusual symptoms to your provider.    Follow-up care  Follow up with your healthcare provider, or as directed. Weekly visits with your provider may be needed.  When to seek medical advice  Call your healthcare provider right away if any of these occur:  Regular or frequent contractions, whether they are painful or not  Pressure in the pelvis  Pressure in the lower belly or mild cramping in your belly with or without diarrhea  Constant low, dull backache  Gush or slow leaking of water from your vagina  Change in vaginal discharge (watery, mucus, or bloody)  Any vaginal bleeding  Decreased movement of your baby  [employer] last reviewed this educational content on 2018 [employer]. All rights reserved. This information is not intended as a substitute for professional medical care. Always follow your healthcare professional's instructions.     Understanding Preeclampsia  Preeclampsia is a condition that can happen in pregnancy. It includes high blood pressure (hypertension), swelling, and signs of organ problems. It can show up around week 20 of pregnancy. It often goes away by 12 weeks after you give birth. It can lead to serious health risks for you and your baby. During your pregnancy, your healthcare provider will watch your blood pressure.      Your blood pressure will be monitored regularly throughout your pregnancy to help check for preeclampsia.     Dangers of preeclampsia   If not treated, preeclampsia can cause problems for you and your baby. The placenta is the organ that nourishes your baby. It may tear away from the wall of the uterus. This can put the baby at risk for health problems (fetal distress). It can put the baby at risk for  birth. Preeclampsia can also cause these health problems in you:   Kidney failure or other organ damage  Seizures  Stroke  Who’s at risk for preeclampsia?   No one knows what causes preeclampsia. It can happen  in any pregnant person. But there are things that increase your risk. You may need to take a daily low dose of aspirin if you are at risk for preeclampsia.   You’re at higher risk for preeclampsia if you have any of these:  Diabetes  High blood pressure  Obesity  Kidney disease  Autoimmune disease such as lupus  A family history of preeclampsia  You’re at higher risk if any of these apply to you:  This is your first pregnancy  You are having twins or more  You’re under age 18 or over age 40  You used in vitro fertilization  You are Black  And you’re at higher risk if you had any of these in a past pregnancy:   Preeclampsia  Intrauterine growth retardation (IUGR)   birth  Placental abruption  Fetal death  Symptoms  A common symptom of preeclampsia is high blood pressure. Other symptoms may include:   Fast weight gain  Protein in your urine  Headache  Belly (abdominal) pain on your right side  Vision problems such as flashes or spots  Swelling (edema) in your face or hands (this often happens near the end of a normal pregnancy)  Tests you may have   Your healthcare provider will want to check your blood pressure. This will need to be done often in your pregnancy. If your blood pressure is high, you may have these tests:   Urine tests to look for protein  Blood tests to confirm preeclampsia  Fetal monitoring to make sure that your baby is healthy  Treating preeclampsia   Preeclampsia almost always ends soon after you give birth. Until then, your healthcare provider can help you manage it.   If your symptoms are mild, you may to:     Limit your activity  Rest in bed  Not do heavy lifting    If your symptoms are severe, you will stay in the hospital. Treatment here may include:   Limits to your activity. This is to help control your blood pressure. You should not lift anything heavy. You will need to spend 8 hours a day lying down with your feet up.  Magnesium IV (intravenous) drip. This is done during labor. It's  to prevent seizures  Induced labor or  section. Birth of the baby is considered the cure for preeclampsia.  When to call your healthcare provider   Call your healthcare provider if your symptoms start quickly or are severe. This includes swelling, weight gain, or other symptoms. Some cases of preeclampsia are more severe than others. Your symptoms also may change or get worse as you get closer to your due date.   Once you give birth   In most cases, preeclampsia goes away on its own soon after you give birth. This is often by the 12th week after you deliver. Within days after you give birth, your blood pressure, swelling, and other symptoms should get better. But for some people, problems from preeclampsia can continue after birth.   Postpartum preeclampsia   Preeclampsia that starts after birth is rare. There are 2 types:   Postpartum preeclampsia. This may start in the first 48 hours after birth.  Late-onset preeclampsia. This starts more than 48 hours after birth.  Both of these types are rare. But call your healthcare provider right away if you have symptoms of preeclampsia after you give birth.   [employer] last reviewed this educational content on 10/1/2021    © 2715-8942 [employer]. All rights reserved. This information is not intended as a substitute for professional medical care. Always follow your healthcare professional's instructions.

## 2024-05-07 NOTE — PROGRESS NOTES
Here for BRICE visit.      Patient's last menstrual period was 10/08/2023 (exact date). 2024, by Last Menstrual Period 30w2d     Baby active. Denies contractions, LOF or bleeding    Labs: has appt scheduled at [facility] for HIV & Trep  Ultrasound: - , EFW 3# 1 oz 62.6%, BELINDA 27.02  Also had Poly with 1st. Normal 1hr GTT. Order placed for growth ultrasound and consult with MFM and contact info given to pt to scheduled    If stands for long time gets numbness in left leg, resolves with sitting. If uses support belt a little better. Advised can try compression hose to help with blood return as well. Likely r/t gravid uterus. Plan rest breaks.     Tdap: completed      ICD-10-CM    1. Prenatal care in third trimester ([facility])  Z34.93       2. Polyhydramnios, antepartum, single or unspecified fetus ([facility])  O40.9XX0 MATERNAL FETAL MEDICINE - INTERNAL            Fetal kick counts, warning signs and signs PTL reviewed.

## 2024-05-08 ENCOUNTER — TELEPHONE (OUTPATIENT)
Dept: OBGYN CLINIC | Facility: CLINIC | Age: 29
End: 2024-05-08

## 2024-05-08 DIAGNOSIS — Z34.81 ENCOUNTER FOR SUPERVISION OF OTHER NORMAL PREGNANCY IN FIRST TRIMESTER (HCC): Primary | ICD-10-CM

## 2024-05-08 NOTE — TELEPHONE ENCOUNTER
Patient is calling needs a lab order  for a test she needs between  30 and 32 week , patient is unsure of the test name ,  [facility] and [facility] ,

## 2024-05-09 LAB — T. PALLIDUM AB, EIA: NEGATIVE

## 2024-05-21 ENCOUNTER — ROUTINE PRENATAL (OUTPATIENT)
Dept: OBGYN CLINIC | Facility: CLINIC | Age: 29
End: 2024-05-21

## 2024-05-21 VITALS
DIASTOLIC BLOOD PRESSURE: 67 MMHG | BODY MASS INDEX: 27 KG/M2 | SYSTOLIC BLOOD PRESSURE: 101 MMHG | WEIGHT: 177 LBS | HEART RATE: 92 BPM

## 2024-05-21 DIAGNOSIS — Z34.93 PRENATAL CARE IN THIRD TRIMESTER (HCC): Primary | ICD-10-CM

## 2024-05-21 DIAGNOSIS — O40.9XX0 POLYHYDRAMNIOS, ANTEPARTUM, SINGLE OR UNSPECIFIED FETUS (HCC): ICD-10-CM

## 2024-05-21 NOTE — PROGRESS NOTES
Active fetus Denies any complaints.  Denies any vaginal bleeding, leaking of fluid or vaginal discharge.   No signs signs of PTL.  Reviewed S&S of PTL  Warning signs reviewed  All questions answered.     Has ultrasound scheduled for Thursday    Reviewed polyhydramnios warning signs and risks.

## 2024-05-23 ENCOUNTER — HOSPITAL ENCOUNTER (OUTPATIENT)
Dept: PERINATAL CARE | Facility: HOSPITAL | Age: 29
Discharge: HOME | End: 2024-05-23
Attending: ADVANCED PRACTICE MIDWIFE

## 2024-05-23 ENCOUNTER — HOSPITAL ENCOUNTER (OUTPATIENT)
Dept: PERINATAL CARE | Facility: HOSPITAL | Age: 29
Discharge: HOME | End: 2024-05-23
Attending: OBSTETRICS & GYNECOLOGY

## 2024-05-23 VITALS
DIASTOLIC BLOOD PRESSURE: 61 MMHG | BODY MASS INDEX: 27 KG/M2 | WEIGHT: 177 LBS | HEART RATE: 98 BPM | SYSTOLIC BLOOD PRESSURE: 111 MMHG

## 2024-05-23 DIAGNOSIS — E03.9 HYPOTHYROIDISM, UNSPECIFIED TYPE: ICD-10-CM

## 2024-05-23 DIAGNOSIS — O40.3XX0 POLYHYDRAMNIOS AFFECTING PREGNANCY IN THIRD TRIMESTER (HCC): Primary | ICD-10-CM

## 2024-05-23 DIAGNOSIS — O40.3XX0 POLYHYDRAMNIOS AFFECTING PREGNANCY IN THIRD TRIMESTER (HCC): ICD-10-CM

## 2024-05-23 DIAGNOSIS — O40.9XX0 POLYHYDRAMNIOS, ANTEPARTUM, SINGLE OR UNSPECIFIED FETUS (HCC): ICD-10-CM

## 2024-05-23 PROCEDURE — 76816 OB US FOLLOW-UP PER FETUS: CPT | Performed by: OBSTETRICS & GYNECOLOGY

## 2024-05-23 PROCEDURE — 76819 FETAL BIOPHYS PROFIL W/O NST: CPT

## 2024-05-23 NOTE — PROGRESS NOTES
Outpatient Maternal-Fetal Medicine Consultation    Dear Ms. Macedo,    Thank you for requesting ultrasound evaluation and maternal fetal medicine consultation on your patient Christy Cespedes.  As you are aware she is a 29 year old female with a Gonzales pregnancy at 32w4d.  A maternal-fetal medicine consultation was requested secondary to polyhydramnios.  Her prenatal records and labs were reviewed.        HISTORY  OB History    Para Term  AB Living   3 2 2 0 0 2   SAB IAB Ectopic Multiple Live Births   0 0 0 0 2     # 1 - Date: 20, Sex: Female, Weight: 7 lb 11 oz (3.487 kg), GA: 40w0d, Type: Vaginal, Vacuum (Extractor), Apgar1: None, Apgar5: None, Living: Living, Birth Comments: polyhydramnios -- spont resolved at 38 weeks     # 2 - Date: 10/02/22, Sex: Female, Weight: 8 lb 0.4 oz (3.64 kg), GA: 41w4d, Type: Normal spontaneous vaginal delivery, Apgar1: 8, Apgar5: 8, Living: Living, Birth Comments: None    # 3 - Date: None, Sex: None, Weight: None, GA: None, Type: None, Apgar1: None, Apgar5: None, Living: None, Birth Comments: None    Past Medical History  The patient  has a past medical history of Anemia (2022), Anxiety, Depression, Hair loss (2023), and Hypothyroidism (2014).    She has no past medical history of Abnormal uterine bleeding, Amenorrhea, Blood disorder, Cancer ([facility]), Chlamydia, Decorative tattoo, Diabetes mellitus ([facility]), Dysmenorrhea, Endometriosis, Exposure to genital herpes, Fibroids, Genital herpes simplex, Gonorrhea, Heart murmur, Hepatitis B virus infection, HIV infection ([facility]), Hormone disorder, Human papilloma virus infection, Hypertension, Infertility, female, Osteoporosis, PID (pelvic inflammatory disease), Sexually transmitted disease, Substance abuse ([facility]), Syphilis, Transfusion history, Tuberculosis contact, Urinary incontinence, Viral hepatitis C, or Viral infection characterized by skin and mucous membrane lesions.    Past Surgical  History  The patient  has a past surgical history that includes tonsillectomy.    Family History  The patient She indicated that her mother is alive. She indicated that her father is alive. She indicated that the status of her maternal grandfather is unknown. She indicated that the status of her paternal grandmother is unknown.      Medications:   Current Outpatient Medications:     cetirizine 10 MG Oral Tab, Take 1 tablet (10 mg total) by mouth daily., Disp: , Rfl:     clindamycin 1 % External Lotion, Apply twice daily with flares of pus numps (Patient not taking: Reported on 5/21/2024), Disp: 60 mL, Rfl: 11    Ferrous Sulfate 325 (65 Fe) MG Oral Tab, Take 1 tablet (325 mg total) by mouth every other day., Disp: 30 tablet, Rfl: 3    ergocalciferol 1.25 MG (18565 UT) Oral Cap, Take 1 capsule (50,000 Units total) by mouth once a week., Disp: 12 capsule, Rfl: 0    montelukast 10 MG Oral Tab, Take 1 tablet (10 mg total) by mouth daily. (Patient not taking: Reported on 5/21/2024), Disp: 90 tablet, Rfl: 0    ondansetron (ZOFRAN) 4 mg tablet, Take 1 tablet (4 mg total) by mouth every 8 (eight) hours as needed for Nausea. (Patient not taking: Reported on 5/21/2024), Disp: 30 tablet, Rfl: 0    triamcinolone 0.1 % External Cream, Apply 1 Application topically 2 (two) times daily. Use for 2 weeks and then decrease to using up to bid Monday-Friday only with flares (Patient not taking: Reported on 5/21/2024), Disp: 454 g, Rfl: 0    levothyroxine 50 MCG Oral Tab, Take 1 tablet (50 mcg total) by mouth before breakfast., Disp: 90 tablet, Rfl: 0    levothyroxine 50 MCG Oral Tab, Take one tab daily M-F , 2 tablets Saturdays and Sundays, Disp: 105 tablet, Rfl: 3    pyridoxine 50 MG Oral Tab, Take 1 tablet (50 mg total) by mouth daily., Disp: , Rfl:     albuterol 108 (90 Base) MCG/ACT Inhalation Aero Soln, Inhale 2 puffs into the lungs every 6 (six) hours as needed for Wheezing or Shortness of Breath. (Patient not taking: Reported on  5/21/2024), Disp: 18 g, Rfl: 0    prenatal vitamin w/DHA 27-0.8-228 MG Oral Cap, Take 1 capsule by mouth daily., Disp: , Rfl:   Allergies:   Allergies   Allergen Reactions    Shellfish DIARRHEA, NAUSEA AND VOMITING, PALPITATIONS, DIZZINESS, SHORTNESS OF BREATH and ANXIETY     Oyster sauce    Lactose DIARRHEA    Seasonal ITCHING         PHYSICAL EXAMINATION:  /61   Pulse 98   Wt 177 lb (80.3 kg)   LMP 10/08/2023 (Exact Date)   BMI 27.31 kg/m²   General: alert and oriented,no acute distress  Abdomen: gravid, soft, non-tender  Extremities: non-tender, no edema        OBSTETRIC ULTRASOUND  The patient had a BPP ultrasound today which I interpreted the results and reviewed them with the patient.    Ultrasound Findings:  Single IUP in cephalic presentation.    Placenta is posterior.   A 3 vessel cord is noted.  Cardiac activity is present at 131 bpm  EFW 2496 g ( 5 lb 8 oz); 77%.    BELINDA is  26.2 cm.  MVP is 11.8 cm  Mild polyhydramnios  BPP is 8/8.     The fetal measurements are consistent with established EDC. No gross ultrasound evidence of structural abnormalities are seen today. The patient understands that ultrasound cannot rule out all structural and chromosomal abnormalities.     See imaging tab for the complete US report.    DISCUSSION  During her visit we discussed and reviewed the following issues:  POLYHYDRAMNIOS  Discussion  Polyhydramnios was demonstrated on today's ultrasound.  I discussed the possible etiologies of polyhydramnios including obstruction, neurologic, karyotypic, nonkaryotypic and diabetes. Hydramnios may increase the risk of PTL/PTD and abruptio placenta if rupture membrane rupture occurs. In most  Cases (~70%) it is idiopathic and approximately 2/3 of hydramnios will resolve spontaneously.  Amniotic fluid was seen within the fetal stomach but this does not exclude the possibility of esophageal / tracheal atresia or webs.     Timing of delivery  In mild to moderate polyhydramnios  with normal NST and BPP,induction of labor advised at 39 to 40 weeks of gestation as the risk of fetal death appears to increase significantly at term. In severe cases, consider induction of labor at 37 weeks to minimize the risk of umbilical cord prolapse and/or abruption upon rupture of membranes. In severe polyhydramnios unresponsive to therapy with intolerant maternal symptoms between 34 and 37 weeks,consider delivery of well-dated pregnancies without requiring amniocentesis for lung maturity.      The Society of Maternal-Fetal Medicine has opined that labor should be allowed to occur spontaneously at term for women with mild idiopathic polyhydramnios. The American College of Obstetricians and Gynecologists recommends delivery at 39+0 to 39+6 weeks, unless additional pregnancy complications warrant earlier delivery. Delivery at a tertiary center is recommended for women with severe polyhydramnios since fetal anomalies may be present.    We reviewed the signs and symptoms of preeclampsia,  labor and monitoring fetal movement.  We discussed reasons for her to call her physician.    We discussed the recommended plan of care based on her  risk factors.  Christy and her significant other, Mono, had their questions answered to their satisfaction.      IMPRESSION:  IUP at 32w4d; cephalic  Fetal growth is at the 77th percentile  Mild polyhydramnios  Maternal hypothyroidism, controlled in part of today's evaluation    RECOMMENDATIONS:  Continue care with Ms. Macedo  Weekly NST at 36 weeks  Follow-up growth and BPP in 4 weeks  Delivery is advised at 39 to 39 weeks 6 days for polyhydramnios    Total time spent 40 minutes this calendar day which includes preparing to see the patient including chart review, obtaining and/or reviewing additional medical history, performing a physical exam and evaluation, documenting clinical information in the electronic medical record, independently interpreting results,  counseling the patient, communicating results to the patient/family/caregiver and coordinating care.     Case discussed with patient who demonstrated understanding and agreement with plan.     Thank you for allowing me to participate in the care of this patient.  Please feel free to contact me with any questions.    Faith Gonzalez MD  Maternal-Fetal Medicine       Note to patient and family:  The 21st Century Cures Act makes medical notes available to patients in the interest of transparency.  However, please be advised that this is a medical document.  It is intended as a peer to peer communication.  It is written in medical language and may contain abbreviations or verbiage that are technical and unfamiliar.  It may appear blunt or direct.  Medical documents are intended to carry relevant information, facts as evident, and the clinical opinion of the practitioner.

## 2024-05-28 ENCOUNTER — PATIENT MESSAGE (OUTPATIENT)
Dept: OBGYN CLINIC | Facility: CLINIC | Age: 29
End: 2024-05-28

## 2024-05-28 NOTE — TELEPHONE ENCOUNTER
From: Christy Cespedes  To: Charlene Canada  Sent: 5/28/2024 1:36 PM CDT  Subject: Approval to fly    Good afternoon Charlene,    I have travel plans this weekend to fly to and from [place] via [facility] [employer]. We spoke at my last appointment about this and ways to best manage flying.   My mom and mother-in-law are concerned that someone may not let me on the plane. (I think I’ll be fine, but said I’d contact the midwives to reassure them)    In the event that someone gives me trouble at the airport for flying while pregnant (especially given how large I’m measuring), I was wondering if I could get written approval from the office? Something stating that I am clear and able to fly on Friday (May 31st) and Sunday (June 2nd).     Thanks in advance,  Christy Cespedes

## 2024-05-28 NOTE — TELEPHONE ENCOUNTER
Pt voices she discussed traveling/flying with you at her last appointment. OK for pt to have letter just in case she needs it saying it is ok for her to fly?

## 2024-05-30 NOTE — PROGRESS NOTES
Here for BRICE visit.      Patient's last menstrual period was 10/08/2023 (exact date). 2024, by Last Menstrual Period 33w3d     Baby active. Denies contractions, LOF or bleeding    Labs: [facility]  Ultrasound: - 34 , Cephalic, 3VC, posterior placenta, BELINDA 26.2- remains poly, EFW 5# 8 oz 77%, AC  99%    Tdap: Completed      ICD-10-CM    1. Prenatal care in third trimester ([facility])  Z34.93       2. Polyhydramnios affecting pregnancy in third trimester ([facility])  O40.3XX0    - Poly of 26 at f/u. Has growth f/u scheduled  -To start NST's at 36 wks  -Recommend delivery 39-39 6/7 wks. She is agreeable to this       Fetal kick counts, warning signs and signs PTL reviewed.    G[facility] nv

## 2024-06-04 ENCOUNTER — ROUTINE PRENATAL (OUTPATIENT)
Dept: OBGYN CLINIC | Facility: CLINIC | Age: 29
End: 2024-06-04

## 2024-06-04 VITALS
DIASTOLIC BLOOD PRESSURE: 69 MMHG | HEART RATE: 89 BPM | SYSTOLIC BLOOD PRESSURE: 104 MMHG | BODY MASS INDEX: 28 KG/M2 | WEIGHT: 182 LBS

## 2024-06-04 DIAGNOSIS — Z34.93 PRENATAL CARE IN THIRD TRIMESTER (HCC): Primary | ICD-10-CM

## 2024-06-04 DIAGNOSIS — O40.3XX0 POLYHYDRAMNIOS AFFECTING PREGNANCY IN THIRD TRIMESTER (HCC): ICD-10-CM

## 2024-06-18 ENCOUNTER — ROUTINE PRENATAL (OUTPATIENT)
Dept: OBGYN CLINIC | Facility: CLINIC | Age: 29
End: 2024-06-18

## 2024-06-18 VITALS — DIASTOLIC BLOOD PRESSURE: 66 MMHG | SYSTOLIC BLOOD PRESSURE: 121 MMHG | BODY MASS INDEX: 28 KG/M2 | WEIGHT: 183.63 LBS

## 2024-06-18 DIAGNOSIS — Z34.83 ENCOUNTER FOR SUPERVISION OF OTHER NORMAL PREGNANCY IN THIRD TRIMESTER (HCC): Primary | ICD-10-CM

## 2024-06-18 NOTE — PROGRESS NOTES
Christy, , is at 36w2d, here for her return OB visit.  Currently, she is feeling well but tired. Denies regular contractions, VB or LOF. Endorses very active fetus. Has some anxiety surrounding pitocin and cascade of interventions. Ok with being induced but hoping to go unmedicated so hoping the pitocin can eventually be turned off.  Also having some anxiety about developing postpartum anxiety again this time. Had it pretty bad with her first. Then with her second she was on zoloft the whole pregnancy and for 3 months postpartum so she did really well that time. Has not been on mediation any longer so nervous for how it will go this time. Does have a therapist that she sees regularly who has been very helpful for her.   Has follow up ultrasound on Thursday with MFM for polyhydramnios. Plans to schedule weekly NSTs then.    Pt offered for MA to be present during exam and patient declined    Vital signs and weight reviewed  See flowsheets     Assessment/Plan:   Discussed natural methods for cervical ripening such as dates, tea, sex, etc. Discussed acupuncture and miles circuit starting next week.   Discussed option to restart zoloft now. Discussed that it takes about 4-6 weeks to see any benefit and even longer for full effects. Pt would like to wait and see how she does this time around.  GBS swab collected today    Next visit: 1 week- birth plan next visit    Reviewed:   Prenatal visit schedule  3rd trimester precautions and expectations   labor precautions  Kick counts  Danger signs      I have spent 20 minutes total time on the day of the encounter, including: preparing to see the patient, ordering/reviewing labs, performing a medically appropriate exam, and providing care coordination. face to face counseling, chart review, orders and coordination of care    Pt verbalized understanding. All questions answered. No barriers to learning identified

## 2024-06-18 NOTE — PATIENT INSTRUCTIONS
Labor and Childbirth: Thinking About a Birth Plan    A birth plan outlines your wishes for labor and birth. It helps your healthcare providers know what you want and expect. But be aware that labor is a series of changing conditions and your birth plan may need to change at the last minute. Work with your healthcare provider to create a plan that leaves room for the unexpected.  Your support team  The team that helps you plan your childbirth may include:  Healthcare provider/certified nurse-midwife. He or she gives prenatal care (care during your pregnancy) and delivers your baby.  Labor nurse. This is a nurse who assists during labor and birth.  Anesthesiologist. This healthcare provider can provide medicine for pain control if you need it.  Support person. This is a person who helps with your emotional and physical comfort during labor. It might be your partner, a family member, or a friend.  Labor  or . This person provides nonmedical advice and support.  Questions to think about  Birth preparation classes can help you think about what to include in your birth plan. When making your plan, ask yourself:  What type of room will I give birth in?  Do I want to be able to walk around during labor and choose labor positions?  What types of comfort measures do I want? Massage, acupressure, birth balls, or music?  Who do I want for my support people? What will their roles be? Who will be with me in the delivery room?  What are my choices for managing pain during labor and birth? How will medicines for pain affect my baby and my labor?  Do I want continuous fetal monitoring?     What types of medicines and IV fluids will I allow to assist me with labor?  What types of procedures or medicines (if any) will I allow to speed up the labor process?     What type of care and length of hospital stay will my health plan cover?  What choices would I consider should unexpected circumstances develop?  If I had a   in the past, is  (vaginal birth after ) a choice?  Do I want immediate contact with my baby after birth with no separation?  How do I want to feed my baby? Breastfeeding only, or will I allow some formula?  Do I want to delay any medicines or immunizations right after my baby is born?  Crystal last reviewed this educational content on 2018  © 7441-5952 [employer]. [address] [place] [address]. All rights reserved. This information is not intended as a substitute for professional medical care. Always follow your healthcare professional's instructions.        Labor and Childbirth: Your Body Prepares  Labor is the series of uterine contractions that dilate (open) and efface (thin) your cervix for birth. Your due date is a guide to when labor will begin, but babies often come days or weeks before or after due dates. Even so, labor need not take you by surprise. In the last weeks of pregnancy, you or your healthcare provider may notice changes that mean labor is near.  Changes in your body  Physical changes often signal that your baby will soon be born:  Discharge from your vagina may increase and become thicker. You may notice a pink or brownish discharge called the bloody show.  The mucous plug may break down over a few weeks or all at once. Losing the plug doesn’t mean that labor will start right away.  You may feel Yuri Rossi contractions (false labor). These irregular contractions start to soften and thin the cervix. Many women mistake these contractions for true labor. They may be more noticeable towards the end of the day.  Feeling like the baby has dropped lower. In preparation for birth, the baby's head has settled deep into your pelvis.  Crystal last reviewed this educational content on 2018  © 3597-9657 [employer]. [address] [place] [address]. All rights reserved. This information is not intended as a substitute for professional  medical care. Always follow your healthcare professional's instructions.        Managing Labor Pain Without Medicine  There are many ways to manage pain during labor. It can often be done with no anesthesia or strong pain medicines. Talk to your healthcare provider about any choices you would like to explore.  Help from relaxation  Some of these are learned in special classes. Your healthcare provider can help you find classes. The hospital or birth center may have a tub or shower you can use during early labor. These methods may be of help to you:  Breathing techniques  A warm tub or shower between contractions  Massage and therapeutic touch by your support person or labor   Reading materials that are comforting or inspiring  Music that is soothing  Hypnosis  Acupuncture and acupressure  Heat and cold application  Aromatherapy  Birth ball  Non-pharmacologic treatment  Injections of sterile water by your healthcare provider can give you temporary pain relief when injected in the back.  If you need medicine  You may plan to use little or no medicine. But you may change your mind during labor. You can ask for medicine at any time if you need it.  [employer] last reviewed this educational content on 2/1/2018 © 2000-2020 The [email or web address]. [address] [place] [address]. All rights reserved. This information is not intended as a substitute for professional medical care. Always follow your healthcare professional's instructions.        Anesthesia Options for Labor    Anesthesia is a type of medicine to prevent pain. It is often used in labor. It may numb only one region of your body. This is called regional anesthesia. Or it may let you sleep during surgery. This is called general anesthesia. Only a trained specialist gives this type of medicine. When possible, your healthcare provider will use regional anesthesia. This is so you can be awake during your baby’s birth. The type of anesthetic you  have may depend on the hospital guidelines.  Regional anesthesia  Your healthcare provider may use regional anesthesia to numb your lower body for a vaginal or  birth. It does not go into your bloodstream. This means that little or none of it will reach your baby. There are two kinds:  Epidural. This is most often given while you sit up or lie on your side. A needle with a flexible tube (catheter) is put into your lower back. The needle is then removed. The anesthetic is sent through the catheter. A pump may be attached. This gives you a constant level of anesthetic. An epidural often affects only part of your muscle control. This means you should still be able to push for a vaginal birth.  Spinal. This is most often given in one dose right before delivery. Your healthcare provider may use it for a  birth. It acts fast. You may sit up or lie down when it is injected. It may affect muscle control in your lower body. This includes your ability to push.  General anesthesia  General anesthesia lets you sleep and keeps you free from pain during surgery. It is most often used for an emergency  birth. Your healthcare provider may use it for a  birth. He or she may give it to you as an injection, as an inhaled gas, or as both. Delivery often happens before the medicine has reached the baby.        [employer] last reviewed this educational content on 10/1/2017  © 5320-5452 The [employer]. [address] [place] [address], [place] [address]. All rights reserved. This information is not intended as a substitute for professional medical care. Always follow your healthcare professional's instructions.        Pregnancy and Childbirth: What to Bring to the Hospital    You’re likely feeling anxious as your child’s birth approaches. This is normal. To give yourself some peace of mind, pack a bag ahead of time. Do this about one month ahead of your estimated delivery date. Here is a list of things to  remember:  Personal care items, like a toothbrush, hair brush, lip balm, lotion, and shampoo  Eyeglasses (if you wear them)  Nightgown (if you plan to breastfeed, pack one that allows for nursing)  Nursing bra if you plan to breastfeed  Bathrobe and slippers  Many hospitals provide maternity underwear, but you may want to bring underwear that can be soiled because you will have bleeding after delivery  Comfortable clothes for you to wear home, like sweat pants, yoga pants, or other stretchable clothes, because your prepregnancy clothes may not fit after delivery of your baby  Clothes for your baby to wear home  Personal music player and headphones  Camera with new batteries or   Coins for vending machines  Telephone numbers of people to call after the birth  Cell phone and   Insurance information and any other paperwork needed for your hospital stay  A list of baby names you are considering  An infant, rear-facing car seat for bringing home your baby (this is required by law)  Add anything else that you don’t want to forget:  _____________________________________  _____________________________________  _____________________________________  _____________________________________  _____________________________________  _____________________________________  _____________________________________  StayWell last reviewed this educational content on 12/1/2017  © 7468-7654 The [employer]. [address] [place] [address], [place] [address]. All rights reserved. This information is not intended as a substitute for professional medical care. Always follow your healthcare professional's instructions.        Labor: Preparing for the Hospital    During the final weeks of your pregnancy, you may have irregular contractions. You may feel a drop in your baby’s position. And the profile of your stomach may look a little different. Because due dates are not exact, have your bag packed and ready for the  hospital.  Packing for the hospital  Here are some items you may want to have ready for the hospital:  A watch or clock with a second hand  Insurance card and identification  Nursing bra, nursing pads, and maternity underwear  Toiletries  Something to entertain yourself, like books or a handheld computer  Heavy socks or slippers and a robe  Clips for your hair, a brush, and lip balm  An MP3 or other music player  A camera or video camera and   Important phone numbers or email addresses  Going-home outfits for you and your baby  A car seat to take your baby home in  Leaving for the hospital  Follow the instructions you’ve been given on when to leave for the hospital. You may be told to call your healthcare provider when it becomes hard to walk or talk during contractions or if your amniotic sac breaks (this causes a gush of water). If your partner makes the phone call for you, be nearby. That way, your healthcare provider can speak to you directly. Many women are told to go to the hospital after an hour of contractions that come 3 to 5 minutes apart. Leave sooner if the hospital is not nearby or is hard to get to.  StayWell last reviewed this educational content on 1/1/2018  © 5149-2544 The [employer]. [address] [place] [address]. All rights reserved. This information is not intended as a substitute for professional medical care. Always follow your healthcare professional's instructions.        False Labor  If your pregnancy is at 37 weeks or longer and you are having contractions that are not true labor, you are having false labor. This means that it is not time yet to give birth to your baby.  True labor contractions can start unevenly but soon take on a regular pattern. As time goes on, the contractions will get stronger. Also, the intervals between the contractions will get shorter. Even at the onset, these contractions last at least 30 seconds and may increase to a minute. True  labor contractions often start in the back and then move to the front.  False labor contractions can be strong, frequent, and painful, but there is no regular pattern. The intensity can vary from strong to mild to strong again. False labor contractions are most often felt in the front. While true labor contractions don’t stop no matter what you are doing, false labor contractions may stop on their own or when you rest or move around.  False labor contractions might make you feel anxious or lose sleep. However, they don’t mean that you are sick or that anything is wrong with your baby. You don’t need to take any medicine for false labor.  Sometimes, it may be too hard to tell false labor from true labor. In such cases, you may need to have a vaginal exam. This allows your healthcare provider to check for changes in the cervix that only occur with true labor.  Home care  It may help to drink plenty of water and take warm baths. Do what you can ahead of time to prepare for giving birth so you’ll have less to worry about later.  Keep a record of your contractions. Write down what time each one starts and how long it lasts. A stopwatch is helpful. Look for the pattern of regularly spaced out contractions with a gradual increase in the time each one lasts.  Don’t be embarrassed about going to the hospital with a “false alarm.” Think of it as good practice for the real thing.    Follow-up care  Follow up with your healthcare provider, or as advised. If you are very worried, confused, can’t eat or sleep, or have questions about your health or pregnancy, schedule an appointment with your provider.  When to seek medical advice  Call your healthcare provider right away if any of these occur:  You are fewer than 37 weeks along in your pregnancy and you’re having contractions.  You have contractions that are regular, getting longer, stronger, and closer together.  Your water breaks.  You have vaginal bleeding.  You feel a  decrease in your baby’s movement or any other unusual changes.   You’re not sure if you are having false or true labor.  Crystal last reviewed this educational content on 6/1/2018  © 7158-2545 The [email or web address]. [address] [place] [address], [place] [address]. All rights reserved. This information is not intended as a substitute for professional medical care. Always follow your healthcare professional's instructions.

## 2024-06-19 LAB — AMB EXT STREP B CULTURE: POSITIVE

## 2024-06-19 NOTE — PROGRESS NOTES
Outpatient Maternal-Fetal Medicine Follow-Up     Dear Ms. Macedo,     Thank you for requesting ultrasound evaluation and maternal fetal medicine consultation on your patient Christy Cespedes.  As you are aware she is a 29 year old female  with a Gonzales pregnancy and an Estimated Date of Delivery: 24.  She returned to maternal-fetal medicine today for a follow-up visit.  Her history was reviewed from her prior visit and there were no interval changes.     Antepartum Risk Factors  Polyhydramnios      S: Good FM.  She is having a lot of contractions but they are not regular or very strong    PHYSICAL EXAMINATION:  /68   Pulse 79   Wt 183 lb (83 kg)   LMP 10/08/2023 (Exact Date)   BMI 28.24 kg/m²   General: alert and oriented, no acute distress  Abdomen: gravid, soft, non-tender  Extremities: non-tender, no edema     OBSTETRIC ULTRASOUND  The patient had a fetal growth & BPP ultrasound today which I interpreted the results and reviewed them with the patient.    Ultrasound Findings:  Single IUP in cephalic presentation.    Placenta is posterior.   A 3 vessel cord is noted.  Cardiac activity is present at 128 bpm  EFW 3509 g ( 7 lb 12 oz); 85%.  AC > 99%  Polyhydramnios BELINDA is  30.9 cm.  MVP is 12.1 cm  BPP is 8/8.     The fetal measurements are consistent with established EDC. No gross ultrasound evidence of structural abnormalities are seen today. The patient understands that ultrasound cannot rule out all structural and chromosomal abnormalities.     See imaging tab for the complete US report.     DISCUSSION  During her visit we discussed and reviewed the following issues:  POLYHYDRAMNIOS  Discussed previously and reviewed. See prior [facility] note for review.     The Society of Maternal-Fetal Medicine has opined that labor should be allowed to occur spontaneously at term for women with mild idiopathic polyhydramnios. The American College of Obstetricians and Gynecologists recommends delivery  at 39+0 to 39+6 weeks, unless additional pregnancy complications warrant earlier delivery. Delivery at a tertiary center is recommended for women with severe polyhydramnios since fetal anomalies may be present.     We reviewed the signs and symptoms of preeclampsia,  labor and monitoring fetal movement.  We discussed reasons for her to call her physician.     We discussed the recommended plan of care based on her  risk factors.  Christy and her significant other, Mono, had their questions answered to their satisfaction.        IMPRESSION:  IUP at 36w4d; cephalic  Fetal growth is at the 85th percentile; BPP is 8/8  Polyhydramnios  Maternal hypothyroidism, controlled in part of today's evaluation     RECOMMENDATIONS:  Continue care with Ms. Macedo  Weekly NST   Delivery is advised at 38-39 weeks due to the increasing polyhydramnios        Total time spent 30 minutes this calendar day which includes preparing to see the patient including chart review, obtaining and/or reviewing additional medical history, performing a physical exam and evaluation, documenting clinical information in the electronic medical record, independently interpreting results, counseling the patient, communicating results to the patient/family/caregiver and coordinating care.     Case discussed with patient who demonstrated understanding and agreement with plan.     Thank you for allowing me to participate in the care of this patient.  Please feel free to contact me with any questions.    Faith Gonzalez MD  Maternal-Fetal Medicine       Note to patient and family:  The 21st Century Cures Act makes medical notes available to patients in the interest of transparency.  However, please be advised that this is a medical document.  It is intended as a peer to peer communication.  It is written in medical language and may contain abbreviations or verbiage that are technical and unfamiliar.  It may appear blunt or direct.  Medical  documents are intended to carry relevant information, facts as evident, and the clinical opinion of the practitioner.

## 2024-06-20 ENCOUNTER — HOSPITAL ENCOUNTER (OUTPATIENT)
Dept: PERINATAL CARE | Facility: HOSPITAL | Age: 29
Discharge: HOME | End: 2024-06-20
Attending: OBSTETRICS & GYNECOLOGY

## 2024-06-20 VITALS
HEART RATE: 79 BPM | SYSTOLIC BLOOD PRESSURE: 106 MMHG | BODY MASS INDEX: 28 KG/M2 | WEIGHT: 183 LBS | DIASTOLIC BLOOD PRESSURE: 68 MMHG

## 2024-06-20 DIAGNOSIS — O40.3XX0 POLYHYDRAMNIOS AFFECTING PREGNANCY IN THIRD TRIMESTER (HCC): ICD-10-CM

## 2024-06-20 DIAGNOSIS — O40.3XX0 POLYHYDRAMNIOS AFFECTING PREGNANCY IN THIRD TRIMESTER (HCC): Primary | ICD-10-CM

## 2024-06-20 PROCEDURE — 76819 FETAL BIOPHYS PROFIL W/O NST: CPT

## 2024-06-20 PROCEDURE — 76816 OB US FOLLOW-UP PER FETUS: CPT | Performed by: OBSTETRICS & GYNECOLOGY

## 2024-06-25 ENCOUNTER — ROUTINE PRENATAL (OUTPATIENT)
Dept: OBGYN CLINIC | Facility: CLINIC | Age: 29
End: 2024-06-25

## 2024-06-25 VITALS — SYSTOLIC BLOOD PRESSURE: 117 MMHG | WEIGHT: 189 LBS | BODY MASS INDEX: 29 KG/M2 | DIASTOLIC BLOOD PRESSURE: 73 MMHG

## 2024-06-25 DIAGNOSIS — O99.013 ANEMIA IN PREGNANCY, THIRD TRIMESTER (HCC): Primary | ICD-10-CM

## 2024-06-25 RX ORDER — MONTELUKAST SODIUM 10 MG/1
10 TABLET ORAL DAILY
Qty: 90 TABLET | Refills: 0 | Status: SHIPPED | OUTPATIENT
Start: 2024-06-25

## 2024-06-25 NOTE — PROGRESS NOTES
Patient's last menstrual period was 10/08/2023 (exact date). 2024, by Last Menstrual Period 37w2d here today for [facility]ICE visit. Baby active. Denies contractions, LOF or bleeding.     GBS- Positive    1st baby- Had poly that resolved spontaneously. Had vacuum delivery    2nd baby- borderline high fluid. Started labor spontaneously but needed AOL. Had PPH 1325 ml. Had retained trailing membranes. Did not need transfusion. Was told might need D & C but ended up not needing.     Moderate poly on last ultrasound. MFM recs IOL 38-39 wks. We discussed risks with SROM including cord prolapse & abruption both of which are emergencies and that IOL recommended so ROM can occur in a controlled environment with quick access to delivery if needed by . She is open to this. Scheduled for  @ 9 am. Discussed + GBS and antibiotics and IOL with Pitocin, likely AROM at some point once head more engaged.     Last hgb 10.8 in April. Repeat CBC ordered    Cervix1.5/50/ballotable.       SOL and warning signs reviewed.

## 2024-06-25 NOTE — PATIENT INSTRUCTIONS
Kick Counts  It’s normal to worry about your baby’s health. One way you can know your baby’s doing well is to record the baby’s movements once a day. This is called a kick count.   Remember to take your kick count records to all your appointments with your healthcare provider.  How to count kicks    Time how long it takes you to feel 10 kicks, flutters, swishes, or rolls. Ideally, you want to feel at least 10 movements in 2 hours. You will likely feel 10 movements in less time than that.  Starting at 28 weeks, count your baby's movements daily. Follow your healthcare provider's instructions for kick counting. Here are tips for counting kicks:  Choose a time when the baby is active, such as after a meal.   Sit comfortably or lie on your side.   The first time the baby moves, write down the time.   Count each movement until the baby has moved  10 times. This can take from 20 minutes to 2 hours.   If you haven't felt 10 kicks by the end of the second hour, wait a few hours. Then try again.  Try to do it at the same time each day.  When to call your healthcare provider  Call your healthcare provider  right away if:  You do a couple sets of kick counts during the day and your baby moves fewer than 10 times in 2 hours.  Your baby moves much less often than on the days before.  You haven't felt your baby move all day.  [employer] last reviewed this educational content on 8/1/2020    © 3370-1989 [employer]. All rights reserved. This information is not intended as a substitute for professional medical care. Always follow your healthcare professional's instructions. Understanding Preeclampsia  Preeclampsia is a condition that can happen in pregnancy. It includes high blood pressure (hypertension), swelling, and signs of organ problems. It can show up around week 20 of pregnancy. It often goes away by 12 weeks after you give birth. It can lead to serious health risks for you and your baby. During your  pregnancy, your healthcare provider will watch your blood pressure.      Your blood pressure will be monitored regularly throughout your pregnancy to help check for preeclampsia.     Dangers of preeclampsia   If not treated, preeclampsia can cause problems for you and your baby. The placenta is the organ that nourishes your baby. It may tear away from the wall of the uterus. This can put the baby at risk for health problems (fetal distress). It can put the baby at risk for  birth. Preeclampsia can also cause these health problems in you:   Kidney failure or other organ damage  Seizures  Stroke  Who’s at risk for preeclampsia?   No one knows what causes preeclampsia. It can happen in any pregnant person. But there are things that increase your risk. You may need to take a daily low dose of aspirin if you are at risk for preeclampsia.   You’re at higher risk for preeclampsia if you have any of these:  Diabetes  High blood pressure  Obesity  Kidney disease  Autoimmune disease such as lupus  A family history of preeclampsia  You’re at higher risk if any of these apply to you:  This is your first pregnancy  You are having twins or more  You’re under age 18 or over age 40  You used in vitro fertilization  You are Black  And you’re at higher risk if you had any of these in a past pregnancy:   Preeclampsia  Intrauterine growth retardation (IUGR)   birth  Placental abruption  Fetal death  Symptoms  A common symptom of preeclampsia is high blood pressure. Other symptoms may include:   Fast weight gain  Protein in your urine  Headache  Belly (abdominal) pain on your right side  Vision problems such as flashes or spots  Swelling (edema) in your face or hands (this often happens near the end of a normal pregnancy)  Tests you may have   Your healthcare provider will want to check your blood pressure. This will need to be done often in your pregnancy. If your blood pressure is high, you may have these tests:   Urine  tests to look for protein  Blood tests to confirm preeclampsia  Fetal monitoring to make sure that your baby is healthy  Treating preeclampsia   Preeclampsia almost always ends soon after you give birth. Until then, your healthcare provider can help you manage it.   If your symptoms are mild, you may to:     Limit your activity  Rest in bed  Not do heavy lifting    If your symptoms are severe, you will stay in the hospital. Treatment here may include:   Limits to your activity. This is to help control your blood pressure. You should not lift anything heavy. You will need to spend 8 hours a day lying down with your feet up.  Magnesium IV (intravenous) drip. This is done during labor. It's to prevent seizures  Induced labor or  section. Birth of the baby is considered the cure for preeclampsia.  When to call your healthcare provider   Call your healthcare provider if your symptoms start quickly or are severe. This includes swelling, weight gain, or other symptoms. Some cases of preeclampsia are more severe than others. Your symptoms also may change or get worse as you get closer to your due date.   Once you give birth   In most cases, preeclampsia goes away on its own soon after you give birth. This is often by the 12th week after you deliver. Within days after you give birth, your blood pressure, swelling, and other symptoms should get better. But for some people, problems from preeclampsia can continue after birth.   Postpartum preeclampsia   Preeclampsia that starts after birth is rare. There are 2 types:   Postpartum preeclampsia. This may start in the first 48 hours after birth.  Late-onset preeclampsia. This starts more than 48 hours after birth.  Both of these types are rare. But call your healthcare provider right away if you have symptoms of preeclampsia after you give birth.   [employer] last reviewed this educational content on 10/1/2021    © 0760-4995 [employer]. All rights reserved.  This information is not intended as a substitute for professional medical care. Always follow your healthcare professional's instructions.

## 2024-06-26 ENCOUNTER — HOSPITAL ENCOUNTER (OUTPATIENT)
Dept: PERINATAL CARE | Facility: HOSPITAL | Age: 29
Discharge: HOME | End: 2024-06-26
Attending: ADVANCED PRACTICE MIDWIFE

## 2024-06-26 ENCOUNTER — PATIENT MESSAGE (OUTPATIENT)
Dept: OBGYN CLINIC | Facility: CLINIC | Age: 29
End: 2024-06-26

## 2024-06-26 ENCOUNTER — NST DOCUMENTATION (OUTPATIENT)
Dept: OBGYN CLINIC | Facility: CLINIC | Age: 29
End: 2024-06-26

## 2024-06-26 VITALS — HEART RATE: 80 BPM | SYSTOLIC BLOOD PRESSURE: 103 MMHG | DIASTOLIC BLOOD PRESSURE: 56 MMHG

## 2024-06-26 DIAGNOSIS — E03.9 HYPOTHYROIDISM, UNSPECIFIED TYPE: ICD-10-CM

## 2024-06-26 DIAGNOSIS — O40.3XX0 POLYHYDRAMNIOS AFFECTING PREGNANCY IN THIRD TRIMESTER (HCC): Primary | ICD-10-CM

## 2024-06-26 DIAGNOSIS — O40.3XX0 POLYHYDRAMNIOS IN THIRD TRIMESTER COMPLICATION, SINGLE OR UNSPECIFIED FETUS (HCC): Primary | ICD-10-CM

## 2024-06-26 PROBLEM — O98.513 COVID-19 AFFECTING PREGNANCY IN THIRD TRIMESTER (HCC): Status: ACTIVE | Noted: 2024-06-26

## 2024-06-26 PROBLEM — U07.1 COVID-19 AFFECTING PREGNANCY IN THIRD TRIMESTER (HCC): Status: ACTIVE | Noted: 2024-06-26

## 2024-06-26 PROCEDURE — 59025 FETAL NON-STRESS TEST: CPT

## 2024-06-26 PROCEDURE — 59025 FETAL NON-STRESS TEST: CPT | Performed by: ADVANCED PRACTICE MIDWIFE

## 2024-06-26 NOTE — NST
Nonstress Test   Patient: Christy Cespedes    Gestation: 37w3d    Diagnosis from order:  Polyhydramnios    Problem List:   Patient Active Problem List   Diagnosis    Hypothyroidism    History of delivery by vacuum extraction, currently pregnant ([facility])    History of postpartum hemorrhage    Vitamin D deficiency    Menorrhagia with regular cycle    Ovarian cyst    Vaginal relaxation    Polyhydramnios affecting pregnancy in third trimester ([facility])       NST: Reactive        2024   NST DOCUMENTATION   Variability 6-25 BPM   Accelerations Yes   Decelerations None   Baseline 125 BPM   Uterine Irritability No   Contractions Not present   Acoustic Stimulator No   Nonstress Test Interpretation Reactive   NST Completed by CARMEN Burleson   Disposition Home    Testing Plan Weekly NST   Provider Notified ANGELIQUE Macedo            I agree with the above evaluation. NST completed.  Devika Macedo CNM  2024  1:56 PM

## 2024-06-26 NOTE — TELEPHONE ENCOUNTER
Devika Gray, RN 6/26/2024 2:14 PM CDT      ----- Message -----  From: Christy Cespedes  Sent: 6/26/2024 1:30 PM CDT  To: [facility] Clinical Pool  Subject: Positive COVID     Good afternoon,    I wanted to let you know that after my allergy complaints yesterday at my routine visit, my symptoms started to feel worse (very fatigued, low fever, runny nose, sneezing, sore throat, coughing up green mucus). I took Unisom last night but was still up every hour.   Then today I went to complete my NST and wore two masks as precaution. NST looked great.     I just completed another at-home COVID test and it’s positive.     I apologize for not wearing masks in the office yesterday.     I’m wondering what I should do now or if this will affect my induction on Sunday.    Thanks in advance,  Christy Cespedes

## 2024-07-02 ENCOUNTER — ROUTINE PRENATAL (OUTPATIENT)
Dept: OBGYN CLINIC | Facility: CLINIC | Age: 29
End: 2024-07-02
Payer: COMMERCIAL

## 2024-07-02 VITALS
DIASTOLIC BLOOD PRESSURE: 66 MMHG | BODY MASS INDEX: 29 KG/M2 | SYSTOLIC BLOOD PRESSURE: 113 MMHG | HEART RATE: 91 BPM | WEIGHT: 186 LBS

## 2024-07-02 DIAGNOSIS — Z34.93 PRENATAL CARE IN THIRD TRIMESTER (HCC): Primary | ICD-10-CM

## 2024-07-02 DIAGNOSIS — U07.1 COVID-19 AFFECTING PREGNANCY IN THIRD TRIMESTER (HCC): ICD-10-CM

## 2024-07-02 DIAGNOSIS — O40.3XX0 POLYHYDRAMNIOS AFFECTING PREGNANCY IN THIRD TRIMESTER (HCC): ICD-10-CM

## 2024-07-02 DIAGNOSIS — O98.513 COVID-19 AFFECTING PREGNANCY IN THIRD TRIMESTER (HCC): ICD-10-CM

## 2024-07-02 PROCEDURE — 59025 FETAL NON-STRESS TEST: CPT | Performed by: ADVANCED PRACTICE MIDWIFE

## 2024-07-02 NOTE — PROGRESS NOTES
Patient's last menstrual period was 10/08/2023 (exact date). 2024, by Last Menstrual Period 38w2d here today for [facility] visit. Baby active. Denies contractions, LOF or bleeding. Had covid, feeling much better, just some congestion    GBS- Positive        Prior birth experiences/outcome: Hx of vacuum with 1st, hx PPH  Hoping to go unmedicated but is open.   IOL scheduled for . Discussed Pitocin IOL. Cervix 2-3 cm, head high/ballotable     SOL and warning signs reviewed.

## 2024-07-02 NOTE — PROCEDURES
Nonstress Test       Patient: Christy Cespedes    Gestation: 38w2d    Diagnosis from order:  Polyhydramnios, Covid in 3rd trimester    Problem List:   Patient Active Problem List   Diagnosis    Advanced maternal age, 1st pregnancy (HCC)     NST:  NST completed.    Fetal Surveillance:   Fetal Heart Tones (FHTs): 125 with moderate variability, accelerations + , decelerations absent  Uterine contractions (Ucs): none    [  X_  ]  Reactive: Discharged home, follow-up plan IOL on Sunday      Devika Macedo CNM, 07/02/24, 2:58 PM

## 2024-07-03 ENCOUNTER — TELEPHONE (OUTPATIENT)
Dept: OBGYN UNIT | Facility: HOSPITAL | Age: 29
End: 2024-07-03

## 2024-07-04 LAB
HEMATOCRIT: 33.1 % (ref 35–45)
HEMOGLOBIN: 11.4 G/DL (ref 11.7–15.5)
MCH: 30.7 PG (ref 27–33)
MCHC: 34.4 G/DL (ref 32–36)
MCV: 89.2 FL (ref 80–100)
MPV: 9.6 FL (ref 7.5–12.5)
PLATELET COUNT: 200 THOUSAND/UL (ref 140–400)
RDW: 12.9 % (ref 11–15)
RED BLOOD CELL COUNT: 3.71 MILLION/UL (ref 3.8–5.1)
WHITE BLOOD CELL COUNT: 7 THOUSAND/UL (ref 3.8–10.8)

## 2024-07-05 PROBLEM — O99.820 GROUP B STREPTOCOCCUS CARRIER, ANTEPARTUM (HCC): Status: ACTIVE | Noted: 2024-07-05

## 2024-07-05 RX ORDER — ERGOCALCIFEROL 1.25 MG/1
50000 CAPSULE ORAL WEEKLY
Qty: 12 CAPSULE | Refills: 0 | Status: ON HOLD | OUTPATIENT
Start: 2024-07-05

## 2024-07-07 ENCOUNTER — HOSPITAL ENCOUNTER (INPATIENT)
Facility: HOSPITAL | Age: 29
LOS: 2 days | Discharge: HOME | End: 2024-07-09
Attending: ADVANCED PRACTICE MIDWIFE | Admitting: OBSTETRICS & GYNECOLOGY
Payer: COMMERCIAL

## 2024-07-07 ENCOUNTER — APPOINTMENT (OUTPATIENT)
Dept: OBGYN CLINIC | Facility: HOSPITAL | Age: 29
End: 2024-07-07
Attending: ADVANCED PRACTICE MIDWIFE
Payer: COMMERCIAL

## 2024-07-07 PROBLEM — O32.0XX0 UNSTABLE LIE OF FETUS (HCC): Status: ACTIVE | Noted: 2024-07-07

## 2024-07-07 PROBLEM — Z34.90 ENCOUNTER FOR INDUCTION OF LABOR (HCC): Status: ACTIVE | Noted: 2024-07-07

## 2024-07-07 PROBLEM — Z34.90 PREGNANCY (HCC): Status: ACTIVE | Noted: 2024-07-07

## 2024-07-07 LAB
ANTIBODY SCREEN: NEGATIVE
BASOPHILS # BLD AUTO: 0.03 X10(3) UL (ref 0–0.2)
BASOPHILS NFR BLD AUTO: 0.4 %
DEPRECATED RDW RBC AUTO: 42.8 FL (ref 35.1–46.3)
EOSINOPHIL # BLD AUTO: 0.06 X10(3) UL (ref 0–0.7)
EOSINOPHIL NFR BLD AUTO: 0.8 %
ERYTHROCYTE [DISTWIDTH] IN BLOOD BY AUTOMATED COUNT: 13.4 % (ref 11–15)
HCT VFR BLD AUTO: 33.7 %
HGB BLD-MCNC: 11.8 G/DL
IMM GRANULOCYTES # BLD AUTO: 0.08 X10(3) UL (ref 0–1)
IMM GRANULOCYTES NFR BLD: 1 %
LYMPHOCYTES # BLD AUTO: 1.62 X10(3) UL (ref 1–4)
LYMPHOCYTES NFR BLD AUTO: 20.9 %
MCH RBC QN AUTO: 30.6 PG (ref 26–34)
MCHC RBC AUTO-ENTMCNC: 35 G/DL (ref 31–37)
MCV RBC AUTO: 87.3 FL
MONOCYTES # BLD AUTO: 0.59 X10(3) UL (ref 0.1–1)
MONOCYTES NFR BLD AUTO: 7.6 %
NEUTROPHILS # BLD AUTO: 5.37 X10 (3) UL (ref 1.5–7.7)
NEUTROPHILS # BLD AUTO: 5.37 X10(3) UL (ref 1.5–7.7)
NEUTROPHILS NFR BLD AUTO: 69.3 %
PLATELET # BLD AUTO: 223 10(3)UL (ref 150–450)
RBC # BLD AUTO: 3.86 X10(6)UL
RH BLOOD TYPE: POSITIVE
T PALLIDUM AB SER QL IA: NONREACTIVE
WBC # BLD AUTO: 7.8 X10(3) UL (ref 4–11)

## 2024-07-07 PROCEDURE — 99232 SBSQ HOSP IP/OBS MODERATE 35: CPT | Performed by: OBSTETRICS & GYNECOLOGY

## 2024-07-07 PROCEDURE — 10907ZC DRAINAGE OF AMNIOTIC FLUID, THERAPEUTIC FROM PRODUCTS OF CONCEPTION, VIA NATURAL OR ARTIFICIAL OPENING: ICD-10-PCS | Performed by: OBSTETRICS & GYNECOLOGY

## 2024-07-07 PROCEDURE — 59412 ANTEPARTUM MANIPULATION: CPT | Performed by: OBSTETRICS & GYNECOLOGY

## 2024-07-07 PROCEDURE — 3E033VJ INTRODUCTION OF OTHER HORMONE INTO PERIPHERAL VEIN, PERCUTANEOUS APPROACH: ICD-10-PCS | Performed by: ADVANCED PRACTICE MIDWIFE

## 2024-07-07 RX ORDER — ACETAMINOPHEN 500 MG
500 TABLET ORAL EVERY 6 HOURS PRN
Status: DISCONTINUED | OUTPATIENT
Start: 2024-07-07 | End: 2024-07-08 | Stop reason: HOSPADM

## 2024-07-07 RX ORDER — CARBOPROST TROMETHAMINE 250 UG/ML
250 INJECTION, SOLUTION INTRAMUSCULAR
Status: DISCONTINUED | OUTPATIENT
Start: 2024-07-07 | End: 2024-07-09

## 2024-07-07 RX ORDER — LIDOCAINE HYDROCHLORIDE 10 MG/ML
30 INJECTION, SOLUTION EPIDURAL; INFILTRATION; INTRACAUDAL; PERINEURAL ONCE
Status: DISCONTINUED | OUTPATIENT
Start: 2024-07-07 | End: 2024-07-08 | Stop reason: HOSPADM

## 2024-07-07 RX ORDER — METOCLOPRAMIDE HYDROCHLORIDE 5 MG/ML
INJECTION INTRAMUSCULAR; INTRAVENOUS
Status: COMPLETED
Start: 2024-07-07 | End: 2024-07-07

## 2024-07-07 RX ORDER — CITRIC ACID/SODIUM CITRATE 334-500MG
SOLUTION, ORAL ORAL
Status: DISCONTINUED
Start: 2024-07-07 | End: 2024-07-07 | Stop reason: WASHOUT

## 2024-07-07 RX ORDER — FAMOTIDINE 10 MG/ML
INJECTION, SOLUTION INTRAVENOUS
Status: COMPLETED
Start: 2024-07-07 | End: 2024-07-07

## 2024-07-07 RX ORDER — IBUPROFEN 600 MG/1
600 TABLET ORAL ONCE AS NEEDED
Status: COMPLETED | OUTPATIENT
Start: 2024-07-07 | End: 2024-07-08

## 2024-07-07 RX ORDER — ONDANSETRON 2 MG/ML
4 INJECTION INTRAMUSCULAR; INTRAVENOUS EVERY 6 HOURS PRN
Status: DISCONTINUED | OUTPATIENT
Start: 2024-07-07 | End: 2024-07-08 | Stop reason: HOSPADM

## 2024-07-07 RX ORDER — SODIUM CHLORIDE 9 MG/ML
INJECTION, SOLUTION INTRAVENOUS ONCE
Status: DISCONTINUED | OUTPATIENT
Start: 2024-07-07 | End: 2024-07-09

## 2024-07-07 RX ORDER — TRANEXAMIC ACID 10 MG/ML
1000 INJECTION, SOLUTION INTRAVENOUS EVERY 30 MIN PRN
Status: DISCONTINUED | OUTPATIENT
Start: 2024-07-07 | End: 2024-07-09

## 2024-07-07 RX ORDER — MISOPROSTOL 200 UG/1
1000 TABLET ORAL ONCE
Status: DISCONTINUED | OUTPATIENT
Start: 2024-07-07 | End: 2024-07-09

## 2024-07-07 RX ORDER — ACETAMINOPHEN 500 MG
1000 TABLET ORAL EVERY 6 HOURS PRN
Status: DISCONTINUED | OUTPATIENT
Start: 2024-07-07 | End: 2024-07-08 | Stop reason: HOSPADM

## 2024-07-07 RX ORDER — SODIUM CHLORIDE, SODIUM LACTATE, POTASSIUM CHLORIDE, CALCIUM CHLORIDE 600; 310; 30; 20 MG/100ML; MG/100ML; MG/100ML; MG/100ML
INJECTION, SOLUTION INTRAVENOUS CONTINUOUS
Status: DISCONTINUED | OUTPATIENT
Start: 2024-07-07 | End: 2024-07-08 | Stop reason: HOSPADM

## 2024-07-07 RX ORDER — LOPERAMIDE HYDROCHLORIDE 2 MG/1
4 CAPSULE ORAL ONCE AS NEEDED
Status: DISCONTINUED | OUTPATIENT
Start: 2024-07-07 | End: 2024-07-09

## 2024-07-07 RX ORDER — TERBUTALINE SULFATE 1 MG/ML
0.25 INJECTION, SOLUTION SUBCUTANEOUS AS NEEDED
Status: COMPLETED | OUTPATIENT
Start: 2024-07-07 | End: 2024-07-07

## 2024-07-07 RX ORDER — CITRIC ACID/SODIUM CITRATE 334-500MG
30 SOLUTION, ORAL ORAL AS NEEDED
Status: DISCONTINUED | OUTPATIENT
Start: 2024-07-07 | End: 2024-07-08 | Stop reason: HOSPADM

## 2024-07-07 RX ORDER — DEXTROSE, SODIUM CHLORIDE, SODIUM LACTATE, POTASSIUM CHLORIDE, AND CALCIUM CHLORIDE 5; .6; .31; .03; .02 G/100ML; G/100ML; G/100ML; G/100ML; G/100ML
INJECTION, SOLUTION INTRAVENOUS AS NEEDED
Status: DISCONTINUED | OUTPATIENT
Start: 2024-07-07 | End: 2024-07-08 | Stop reason: HOSPADM

## 2024-07-07 RX ORDER — LOPERAMIDE HYDROCHLORIDE 2 MG/1
2 CAPSULE ORAL AS NEEDED
Status: DISCONTINUED | OUTPATIENT
Start: 2024-07-07 | End: 2024-07-09

## 2024-07-07 RX ORDER — METHYLERGONOVINE MALEATE 0.2 MG/ML
0.2 INJECTION INTRAVENOUS ONCE
Status: COMPLETED | OUTPATIENT
Start: 2024-07-07 | End: 2024-07-08

## 2024-07-07 NOTE — PLAN OF CARE
Problem: BIRTH - VAGINAL/ SECTION  Goal: Fetal and maternal status remain reassuring during the birth process  Description: INTERVENTIONS:  - Monitor vital signs  - Monitor fetal heart rate  - Monitor uterine activity  - Monitor labor progression (vaginal delivery)  - DVT prophylaxis (C/S delivery)  - Surgical antibiotic prophylaxis (C/S delivery)  Outcome: Progressing     Problem: PAIN - ADULT  Goal: Verbalizes/displays adequate comfort level or patient's stated pain goal  Description: INTERVENTIONS:  - Encourage pt to monitor pain and request assistance  - Assess pain using appropriate pain scale  - Administer analgesics based on type and severity of pain and evaluate response  - Implement non-pharmacological measures as appropriate and evaluate response  - Consider cultural and social influences on pain and pain management  - Manage/alleviate anxiety  - Utilize distraction and/or relaxation techniques  - Monitor for opioid side effects  - Notify MD/LIP if interventions unsuccessful or patient reports new pain  - Anticipate increased pain with activity and pre-medicate as appropriate  Outcome: Progressing     Problem: ANXIETY  Goal: Will report anxiety at manageable levels  Description: INTERVENTIONS:  - Administer medication as ordered  - Teach and rehearse alternative coping skills  - Provide emotional support with 1:1 interaction with staff  Outcome: Progressing     Problem: Patient Centered Care  Goal: Patient preferences are identified and integrated in the patient's plan of care  Description: Interventions:  - What would you like us to know as we care for you? It's a boy!  - Provide timely, complete, and accurate information to patient/family  - Incorporate patient and family knowledge, values, beliefs, and cultural backgrounds into the planning and delivery of care  - Encourage patient/family to participate in care and decision-making at the level they choose  - Honor patient and family  perspectives and choices  Outcome: Progressing     Problem: Patient/Family Goals  Goal: Patient/Family Long Term Goal  Description: Patient's Long Term Goal: Uncomplicated Delivery     Interventions:  - Assessment/Monitoring  - Induction/Augmentation per protocol and Provider order  - C/S per protocol and Provider order   - Education  - Intervention per protocol and Provider order with education   - Involve patient in POC  - See additional Care Plan goals for specific interventions  Outcome: Progressing  Goal: Patient/Family Short Term Goal  Description: Patient's Short Term Goal: Comfort and Pain Control     Interventions:   - Non Pharmacological pain intervention   - IV/IM and Epidural pain medication per Provider order and patient request  - Education  - Involve Patient in POC   - See additional Care Plan goals for specific interventions  Outcome: Progressing

## 2024-07-07 NOTE — PROGRESS NOTES
Pt is a 29 year old female admitted to LDR6/LDR6-A.     Chief Complaint   Patient presents with    Scheduled Induction      Pt is  39w0d intra-uterine pregnancy.  History obtained, consents signed. Oriented to room, staff, and plan of care.

## 2024-07-07 NOTE — H&P
[facility]  part of [facility]    History & Physical    Christy Cespedes Patient Status:  Inpatient    3/10/1995 MRN [id number]   Location [facility] Attending Devika Macedo CNM   Hosp Day # 0 Admitting Rao Emmanuel MD     Date of Admission:  2024      HPI:   Christy Cespedes is 29 year old and  with current gestational age of 39w0d and estimated due date of 2024, by Last Menstrual Period consistent with 22 week ultrasound.     Christy is being admitted for induction of labor.    Her current obstetrical history is significant for GBS colonizer, polyhydramnios, covid in 3rd trimester, hypothyroid.    Patient reports good fetal movement, no bleeding, no contractions, and no leaking .     Fetal Movement: normal.     History   Obstetric History:   OB History    Para Term  AB Living   3 2 2     2   SAB IAB Ectopic Multiple Live Births           2      # Outcome Date GA Lbr Severo/2nd Weight Sex Type Anes PTL Lv   3 Current            2 Term 10/02/22 41w4d 03:37 / 00:06 8 lb 0.4 oz (3.64 kg) F NORMAL SPONT None N WIL   1 Term 20 40w0d  7 lb 11 oz (3.487 kg) F Vag-Vacuum EPI N WIL      Birth Comments: polyhydramnios -- spont resolved at 38 weeks       Complications: Polyhydramnios, Polyhydramnios (HCC)     Past Medical History:   Past Medical History:    Anemia    Anxiety    Depression    Hair loss    Hypothyroidism     Past Social History:   Past Surgical History:   Procedure Laterality Date    Tonsillectomy       Family History:   Family History   Problem Relation Age of Onset    Lipids Father     Ovarian Cancer Mother 42        BRCA neg    Other (Other) Mother         asthma     Prostate Cancer Maternal Grandfather     Breast Cancer Paternal Grandmother      Social History:   Social History     Tobacco Use    Smoking status: Never    Smokeless tobacco: Never   Substance Use Topics    Alcohol use: Not Currently     Comment:  occ prior to pregnancy         Allergies/Medications:   Allergies:   Allergies   Allergen Reactions    Shellfish DIARRHEA, NAUSEA AND VOMITING, PALPITATIONS, DIZZINESS, SHORTNESS OF BREATH and ANXIETY     Oyster sauce    Lactose DIARRHEA    Seasonal ITCHING     Medications:  Medications Prior to Admission   Medication Sig Dispense Refill Last Dose    Ferrous Sulfate 325 (65 Fe) MG Oral Tab Take 1 tablet (325 mg total) by mouth every other day. 30 tablet 3 7/6/2024    prenatal vitamin w/DHA 27-0.8-228 MG Oral Cap Take 1 capsule by mouth daily.   7/6/2024    ERGOCALCIFEROL 1.25 MG (58393 UT) Oral Cap TAKE 1 CAPSULE BY MOUTH 1 TIME A WEEK 12 capsule 0     MONTELUKAST 10 MG Oral Tab TAKE 1 TABLET(10 MG) BY MOUTH DAILY 90 tablet 0 More than a month    cetirizine 10 MG Oral Tab Take 1 tablet (10 mg total) by mouth daily.       clindamycin 1 % External Lotion Apply twice daily with flares of pus numps (Patient not taking: Reported on 5/21/2024) 60 mL 11     ondansetron (ZOFRAN) 4 mg tablet Take 1 tablet (4 mg total) by mouth every 8 (eight) hours as needed for Nausea. (Patient not taking: Reported on 5/21/2024) 30 tablet 0     triamcinolone 0.1 % External Cream Apply 1 Application topically 2 (two) times daily. Use for 2 weeks and then decrease to using up to bid Monday-Friday only with flares (Patient not taking: Reported on 5/21/2024) 454 g 0     levothyroxine 50 MCG Oral Tab Take 1 tablet (50 mcg total) by mouth before breakfast. 90 tablet 0     levothyroxine 50 MCG Oral Tab Take one tab daily M-F , 2 tablets Saturdays and Sundays 105 tablet 3     pyridoxine 50 MG Oral Tab Take 1 tablet (50 mg total) by mouth daily.       albuterol 108 (90 Base) MCG/ACT Inhalation Aero Soln Inhale 2 puffs into the lungs every 6 (six) hours as needed for Wheezing or Shortness of Breath. 18 g 0 More than a month       Review of Systems:   Constitutional: denies fever, aches, chills  HEENT: denies visual changes  Skin: denies any unusual  skin lesions or itching  Lungs: denies shortness of breath  Cardiovascular: denies chest pain  GI: denies abdominal pain,denies heartburn, denies constipation or diarrhea  : denies dysuria, pelvic pain, vaginal discharge or bleeding  Musculoskeletal: denies back or joint pain  Neuro denies headaches or dizziness  Psych: denies depression or anxiety    Physical Exam:        Constitutional: alert, appears stated age, and cooperative  Skin: no lesions or erythema  Respiratory: clear, unlabored  Cardiac: regular rate and rhythm   Abdomen: soft, non-tender, presentation Cephalic confirmed by limited bs ultrasound for presentation, uterine contractions x 1  Ultrasound with MFM 06/20: EFW 7# 12 oz 85%, BELINDA 30.9  Fetal Surveillance:  135 BPM; Fetal heart variability: moderate  Fetal Heart Rate decelerations: none  Fetal Heart Rate accelerations: yes  Pelvis: Gynecoid  External Genitalia:  No lesions  Sterile vaginal exam: Dilation: 2.5 cm dilation  Effacement: 25%  Station: high  Position: Cephalic  Extremities: extremities normal, atraumatic, no cyanosis or edema  Musculoskeletal: steady gait  Reflexes: +1/+1  Psych:  Appropriate affect and speech    Results:     Prenatal Results       Initial       Test Value Reference Range Date Time    Blood Type (ABO Group)  A   10/01/22 2006    Rh Factor  Positive   10/01/22 2006    Antibody Screen (Required questions in OE to answer)  Negative   12/29/23 1111    HCT  37.1 % 35.0 - 45.0 12/22/23 0931    HGB  12.3 g/dL 11.7 - 15.5 12/22/23 0931    MCV  87.1 fL 80.0 - 100.0 12/22/23 0931    Platelets  258 Thousand/uL 140 - 400 12/22/23 0931    Rubella  1.13 Index  12/22/23 0928    TREP  NEGATIVE  NEGATIVE 12/22/23 0928    RPR ([facility])        Urine Culture  SEE NOTE   12/22/23 0928    Hepatitis B  NON-REACTIVE  NON-REACTIVE 12/22/23 0928    HIV Combo  NON-REACTIVE  NON-REACTIVE 12/22/23 0928    HCV  NON-REACTIVE  NON-REACTIVE 12/22/23 0928              Optional Initial Labs       Test  Value Reference Range Date Time    TSH  0.805 mIU/mL 0.550 - 4.780 24 1551       1.498 mIU/mL 0.550 - 4.780 23 1111       2.05 mIU/L  23 0931    Pap Smear  Negative for intraepithelial lesion or malignancy  Negative for intraepithelial lesion or malignancy 21 1557    HPV        GC DNA        Chlamydia DNA        GTT 1 Hr        Glucose Fasting        Glucose 1 Hr        Glucose 2 Hr        Glucose 3 Hr        HgB A1c  4.9 % of total Hgb <5.7 23 0928    Vitamin D                  8-20 Weeks       Test Value Reference Range Date Time    1st Trimester Aneuploidy Risk Assessment        Quad - Down Screen Risk Estimate - prior to 18        Quad - Down Maternal Age Risk - prior to 18        Quad - Trisomy 18 screen Risk Estimate - prior to 18        AFP Spina Bifida (Required questions in OE to answer )        QUAD/AFP - Interpretation        Free Fetal DNA         Genetic testing        Genetic testing        Genetic testing        GC DNA        Chlamydia DNA                  24-28 Weeks       Test Value Reference Range Date Time    HCT  33.6 % 35.0 - 45.0 24 0827    HGB  10.8 g/dL 11.7 - 15.5 24 0827    Platelets  232 Thousand/uL 140 - 400 24 0827    GTT 1 Hr  87 mg/dL <135 24 0827    Glucose Fasting        Glucose 1 Hr        Glucose 2 Hr        Glucose 3 Hr        TSH         Profile        Urine Culture        GC DNA        Chlamydia DNA                  35-37 Weeks       Test Value Reference Range Date Time    HCT  33.7 % 35.0 - 48.0 24 0929       33.1 % 35.0 - 45.0 24 1347    HGB  11.8 g/dL 12.0 - 16.0 24 0929       11.4 g/dL 11.7 - 15.5 24 1347    Platelets  223.0 10(3)uL 150.0 - 450.0 24 0929       200 Thousand/uL 140 - 400 24 1347    TREP  NEGATIVE  NEGATIVE 24 1418    RPR ([facility])        Genital Group B Culture ^ Positive  Negative 24        SEE NOTE   24 1238    TSH        Urine  Culture        HIV Combo  NON-REACTIVE  NON-REACTIVE 24 1418    GC DNA        Chlamydia DNA                  Optional Labs       Test Value Reference Range Date Time    HgB A1c  4.9 % of total Hgb <5.7 23 0928    HGB Electrophoresis  (See Report)    23 1111    Varicella Zoster  1,338.00 index  23 0928    Cystic Fibrosis-Old         Cystic Fibrosis[32] (Required questions in OE to answer)        Cystic Fibrosis[165] (Required questions in OE to answer)        Cystic Fibrosis[165] (Required questions in OE to answer)        Cystic Fibrosis[165] (Required questions in OE to answer)        Sickle Cell        24Hr Urine Protein        24Hr Urine Creatinine        Parvo B19 IgM        Parvo B19 IgG                  Legend    ^: Historical                            Reviewed all prenatal ultrasounds    Admit labs pending    Assessment/Plan:   Christy is 29 year old and  with current gestational age of 39w0d and estimated due date of 2024, by Last Menstrual Period consistent with 22 week ultrasound    Not in labor.  Category 1 FHR tracing  Obstetrical history significant for GBS colonizer, polyhydramnios, covid in 3rd trimester, hypothyroid    Admission problem(s):    Encounter for IOL  -IOL 2nd to moderate polyhydramnios  -Admit, routine labs  -Start Ampicillin for GBS prophylaxis. Plan to start Pitocin protocol 2, 3 hours after antibiotics  -Reactive NST. Plan for CEFM once Pitocin started. Can be off monitor until then.       Hypothyroidism  -Stable on meds, managed by PCP      History of delivery by vacuum extraction, currently pregnant (HCC)  -1st pregnancy, 2nd baby       History of postpartum hemorrhage  -with 2nd. Plan active mgmt 3rd stage. Hemorrhage meds at bs for delivery      Polyhydramnios affecting pregnancy in third trimester ([facility])  -moderate poly of 30 at last ultrasound with MFM      COVID-19 affecting pregnancy in third trimester ([facility])  -Covid positive .  Symptoms were mild, now resolved      Group B Streptococcus carrier, antepartum (HCC)  -Start Ampicillin      Pregnancy (HCC)            Risks, benefits, alternatives and possible complications have been discussed in detail with the patient.   Pre-admission, admission, and post admission procedures and expectations were discussed in detail.    All questions answered, all appropriate consents will be signed at the Hospital. Admission is planned for today.   Intervention: IV Pitocin induction.    Devika Macedo CNM  7/7/2024  10:02 AM

## 2024-07-08 LAB
BASOPHILS # BLD AUTO: 0.02 X10(3) UL (ref 0–0.2)
BASOPHILS NFR BLD AUTO: 0.2 %
DEPRECATED RDW RBC AUTO: 42.6 FL (ref 35.1–46.3)
EOSINOPHIL # BLD AUTO: 0.02 X10(3) UL (ref 0–0.7)
EOSINOPHIL NFR BLD AUTO: 0.2 %
ERYTHROCYTE [DISTWIDTH] IN BLOOD BY AUTOMATED COUNT: 13.3 % (ref 11–15)
HCT VFR BLD AUTO: 30.3 %
HGB BLD-MCNC: 10.9 G/DL
IMM GRANULOCYTES # BLD AUTO: 0.12 X10(3) UL (ref 0–1)
IMM GRANULOCYTES NFR BLD: 0.9 %
LYMPHOCYTES # BLD AUTO: 1.12 X10(3) UL (ref 1–4)
LYMPHOCYTES NFR BLD AUTO: 8.4 %
MCH RBC QN AUTO: 31.9 PG (ref 26–34)
MCHC RBC AUTO-ENTMCNC: 36 G/DL (ref 31–37)
MCV RBC AUTO: 88.6 FL
MONOCYTES # BLD AUTO: 0.86 X10(3) UL (ref 0.1–1)
MONOCYTES NFR BLD AUTO: 6.5 %
NEUTROPHILS # BLD AUTO: 11.14 X10 (3) UL (ref 1.5–7.7)
NEUTROPHILS # BLD AUTO: 11.14 X10(3) UL (ref 1.5–7.7)
NEUTROPHILS NFR BLD AUTO: 83.8 %
PLATELET # BLD AUTO: 174 10(3)UL (ref 150–450)
RBC # BLD AUTO: 3.42 X10(6)UL
WBC # BLD AUTO: 13.3 X10(3) UL (ref 4–11)

## 2024-07-08 PROCEDURE — 4A1H74Z MONITORING OF PRODUCTS OF CONCEPTION, CARDIAC ELECTRICAL ACTIVITY, VIA NATURAL OR ARTIFICIAL OPENING: ICD-10-PCS | Performed by: ADVANCED PRACTICE MIDWIFE

## 2024-07-08 PROCEDURE — 10H073Z INSERTION OF MONITORING ELECTRODE INTO PRODUCTS OF CONCEPTION, VIA NATURAL OR ARTIFICIAL OPENING: ICD-10-PCS | Performed by: ADVANCED PRACTICE MIDWIFE

## 2024-07-08 PROCEDURE — 59400 OBSTETRICAL CARE: CPT | Performed by: ADVANCED PRACTICE MIDWIFE

## 2024-07-08 RX ORDER — SIMETHICONE 80 MG
80 TABLET,CHEWABLE ORAL 3 TIMES DAILY PRN
Status: DISCONTINUED | OUTPATIENT
Start: 2024-07-08 | End: 2024-07-09

## 2024-07-08 RX ORDER — ACETAMINOPHEN 500 MG
1000 TABLET ORAL EVERY 6 HOURS PRN
Status: DISCONTINUED | OUTPATIENT
Start: 2024-07-08 | End: 2024-07-09

## 2024-07-08 RX ORDER — IBUPROFEN 600 MG/1
600 TABLET ORAL EVERY 6 HOURS
Status: DISCONTINUED | OUTPATIENT
Start: 2024-07-08 | End: 2024-07-09

## 2024-07-08 RX ORDER — DOCUSATE SODIUM 100 MG/1
100 CAPSULE, LIQUID FILLED ORAL
Status: DISCONTINUED | OUTPATIENT
Start: 2024-07-08 | End: 2024-07-09

## 2024-07-08 RX ORDER — BENZOCAINE/MENTHOL 6 MG-10 MG
1 LOZENGE MUCOUS MEMBRANE EVERY 6 HOURS PRN
Status: DISCONTINUED | OUTPATIENT
Start: 2024-07-08 | End: 2024-07-09

## 2024-07-08 RX ORDER — CHOLECALCIFEROL (VITAMIN D3) 25 MCG
1 TABLET,CHEWABLE ORAL DAILY
Status: DISCONTINUED | OUTPATIENT
Start: 2024-07-08 | End: 2024-07-09

## 2024-07-08 RX ORDER — ONDANSETRON 2 MG/ML
4 INJECTION INTRAMUSCULAR; INTRAVENOUS EVERY 6 HOURS PRN
Status: DISCONTINUED | OUTPATIENT
Start: 2024-07-08 | End: 2024-07-09

## 2024-07-08 RX ORDER — ACETAMINOPHEN 500 MG
500 TABLET ORAL EVERY 6 HOURS PRN
Status: DISCONTINUED | OUTPATIENT
Start: 2024-07-08 | End: 2024-07-09

## 2024-07-08 RX ORDER — BISACODYL 10 MG
10 SUPPOSITORY, RECTAL RECTAL ONCE AS NEEDED
Status: DISCONTINUED | OUTPATIENT
Start: 2024-07-08 | End: 2024-07-09

## 2024-07-08 RX ORDER — AMMONIA INHALANTS 0.04 G/.3ML
0.3 INHALANT RESPIRATORY (INHALATION) AS NEEDED
Status: DISCONTINUED | OUTPATIENT
Start: 2024-07-08 | End: 2024-07-09

## 2024-07-08 NOTE — PROGRESS NOTES
[facility]  part of [facility]    Labor Progress Note    Christy Cespedes Patient Status:  Inpatient    3/10/1995 MRN [id number]   Location [facility] Attending Devika Macedo CNM   Hosp Day # 1 PCP David Patel MD     Subjective:   Interval History:   Christy is feeling some rectal pressure with contractions.    Objective:   Vital signs in last 24 hours:  Temp:  [97.9 °F (36.6 °C)-98.4 °F (36.9 °C)] 98 °F (36.7 °C)  Pulse:  [] 94  Resp:  [16] 16  BP: (104-116)/(51-84) 113/84    Input/Output:    Intake/Output Summary (Last 24 hours) at 2024 0249  Last data filed at 2024 1835  Gross per 24 hour   Intake 300 ml   Output --   Net 300 ml     Fetal Surveillance:   Fetal Heart Tones (FHTs): 135 with moderate variability, accelerations present, decelerations present - variables, not persistent  Uterine contractions (Ucs): q3-5 minutes    Sterile Vaginal Exam (SVE):   6/100/-1    Other Measures:   Pitocin at 12mUnits    Assessment & Plan:     Encounter for induction of labor (HCC)  Category 2 FHR tracing  Anticipate       Hypothyroidism    History of delivery by vacuum extraction, currently pregnant (HCC)    History of postpartum hemorrhage    Polyhydramnios affecting pregnancy in third trimester (HCC)    COVID-19 affecting pregnancy in third trimester ([facility])    Group B Streptococcus carrier, antepartum (HCC)    Unstable lie of fetus (HCC)  Cephalic at this time      Plan discussed with patient who verbalizes understanding and agreement.    Adri Tapia CNM  2024

## 2024-07-08 NOTE — PROGRESS NOTES
[facility]  part of [facility]    Labor Progress Note    Christy Cespedes Patient Status:  Inpatient    3/10/1995 MRN [id number]   Location [facility] Attending Devika Macedo CNM   Hosp Day # 1 PCP David Patel MD     Subjective:   Interval History:   Christy is comfortable. Not really feeling contractions..    Objective:   Vital signs in last 24 hours:  Temp:  [97.9 °F (36.6 °C)-98.4 °F (36.9 °C)] 97.9 °F (36.6 °C)  Pulse:  [] 104  Resp:  [16] 16  BP: (107-116)/(51-71) 107/67    Fetal Surveillance:   Fetal Heart Tones (FHTs): 135 with moderate variability, accelerations present, decelerations absent  Uterine contractions (Ucs): q2-3 minutes    Sterile Vaginal Exam (SVE):   Deferred at this time    Other Measures:   Abdominal binder in place      Assessment & Plan:     Encounter for induction of labor (HCC)  Christy is 29 year old, , with current EGA of 39w1d, here for IOL  Category 1 FHR tracing      Hypothyroidism    History of delivery by vacuum extraction, currently pregnant (HCC)    History of postpartum hemorrhage    Polyhydramnios affecting pregnancy in third trimester (HCC)    COVID-19 affecting pregnancy in third trimester ([facility])    Group B Streptococcus carrier, antepartum (HCC)    Unstable lie of fetus (HCC)      Plan discussed with patient who verbalizes understanding and agreement.    Adri Tapia CNM  2024

## 2024-07-08 NOTE — PROGRESS NOTES
[facility]  part of [facility]    Labor Progress Note    Christy Cespedes Patient Status:  Inpatient    3/10/1995 MRN [id number]   Location [facility] Attending Devika Macedo, [facility]   Hosp Day # 0 PCP David Patel MD     Subjective:   Interval History:   Assumed care of Christy at 1900. Shortly after she started feeling rectal pressure and consented to exam. Upon exam, this provider felt padding/cushion - unable to AROM suspected forebag. Leopolds performed and fetus felt in breech presentation. Dr Emmanuel, who was in-house and on-call for [id number] for CNM consults was called and confirmed breech with ultrasound. He reviewed the patients options including risks/benefits with her which were to go for C/S or to attempt a version and pt opted to try the version. Version was successful after stopping Pitocin and giving Terbutaline. Pt and fetus tolerated procedure well. Will monitor FHTs for 1 hour then resume induction process. High binder placed.    Objective:   Vital signs in last 24 hours:  Temp:  [98 °F (36.7 °C)-98.4 °F (36.9 °C)] 98.2 °F (36.8 °C)  Pulse:  [92-97] 92  Resp:  [16] 16  BP: (112-113)/(62-71) 113/71    Input/Output:    Intake/Output Summary (Last 24 hours) at 2024  Last data filed at 2024 1835  Gross per 24 hour   Intake 300 ml   Output --   Net 300 ml       Fetal Surveillance:   Fetal Heart Tones (FHTs): were 140 with moderate variability, accelerations present, decelerations present - variable prior to version. FHTs after version are 160 with minimal to moderate variability, no accelerations, no decelerations  Uterine contractions (Ucs): about q2-3 minutes    Sterile Vaginal Exam (SVE):   5-6/90/-2    Other Measures:   Estimated Fetal Weight (EFW): 3400g  Pelvis: proven to 3640g  Pitocin was at 15mUnits but is now off    Results:   Lab Results   Component Value Date    TREPONEMALAB Nonreactive 2024    ABO A 2024     RH Positive 07/07/2024    WBC 7.8 07/07/2024    HGB 11.8 (L) 07/07/2024    HCT 33.7 (L) 07/07/2024    .0 07/07/2024    CREATSERUM 0.58 12/22/2023    BUN 8 12/22/2023     12/22/2023    K 3.7 12/22/2023     12/22/2023    CO2 25 12/22/2023    GLU 89 12/22/2023    CA 9.6 12/22/2023    ALB 4.0 12/22/2023    ALKPHO 37 12/22/2023    BILT 0.3 12/22/2023    TP 6.3 12/22/2023    AST 18 02/08/2024    ALT 11 02/08/2024    T4F 0.9 07/20/2022    TSH 0.805 02/08/2024       Assessment & Plan:     Encounter for induction of labor ([facility])    Hypothyroidism    History of delivery by vacuum extraction, currently pregnant ([facility])    History of postpartum hemorrhage    Polyhydramnios affecting pregnancy in third trimester ([facility])    COVID-19 affecting pregnancy in third trimester ([facility])    Group B Streptococcus carrier, antepartum ([facility])      Unstable lie of fetus ([facility])  Was confirmed cephalic upon admission but turned to breech  Successfully verted and will place binder and resume IOL after 1 hour          Plan discussed with patient who verbalizes understanding and agreement.    Adri Tapia CNM  7/7/2024  Caring for patient 1900 today until 1900 tomorrow

## 2024-07-08 NOTE — PROCEDURES
External Cephalic Version Procedure Note    Pre procedure diagnosis:   Breech Presentation.  Polyhydramnios.   Induction of labor.   Category 2 fetal heart rate tracing.     Posterior procedure diagnosis:   Same with Cephalic presentation.     Indications: Patient is a 29 year old  with polyhydramnios undergoing induction of labor and found to be breech presentation.   Discussed risks, benefits, and alternative of  section. Patient understood and agreed.     Procedure description:   Ultrasound gell copiously applied to the abdomen.   Ultrasound confirmed fetal head maternal right.   Using gentle downward traction behind the occiput for a fetal forward flip and gentle traction to move the presenting part up from the pelvis.   Fetus moved with one attempt to cephalic presentation.  Fetal heart rate noted at normal rate by ultrasound.   FHR tracing monitor applied and FHR noted for baseline 160, minimal variability, no decelerations.     Recommendations:   Continue pitocin with increased FHR variability.   High abdominal binder during labor.   Repeat ultrasound as needed.   Fetal scalp electrode can be used as well.     Dr. Rao Emmanuel MD    [facility]     This note was created by [email or web address] voice recognition. Errors in content may be related to improper recognition by the system; efforts to review and correct have been done but errors may still exist. Please be advised the primary purpose of this note is for me to communicate medical care. Standard sentence structure is not always used. Medical terminology and medical abbreviations may be used. There may be grammatical, typographical, and automated fill ins that may have errors missed in proofreading.

## 2024-07-08 NOTE — L&D DELIVERY NOTE
Audie Cespedes [[id number]]      Labor Events     labor?: No   steroids?: None  Antibiotics received during labor?: Yes  Antibiotics (enter # doses in comment): ampicillin (Comment: x2 doses for GBS +)  Rupture date/time: 2024     Rupture type: AROM  Fluid color: Clear  Labor type: Induced Onset of Labor  Induction: Oxytocin, AROM  Indications for induction: Polyhydramnios  Intrapartum & labor complications: Polyhydramnios       Labor Event Times    Labor onset date/time: 2024  Dilation complete date/time: 2024  Start pushing date/time: 2024        Presentation    Presentation: Vertex  Position: Right Occiput Anterior       Operative Delivery    Operative Vaginal Delivery: No                Shoulder Dystocia    Shoulder Dystocia: No       Anesthesia    Method: None              [place] Delivery      Head delivery date/time: 2024 04:47:53   Delivery date/time:  24 04:48:21       Details:            Delivery Providers    Delivering Clinician: Adri Tapia CNM   Delivery personnel:  Provider Role    Baby Nurse    Delivery Nurse             Cord    No data filed        Measurements    No data filed       Placenta    Date/time: 2024  Removal: Spontaneous  Appearance: Abnormal  Disposition: Pathology       Apgars    No data filed       Skin to Skin    No data filed       Vaginal Count    Initial count RN: Bernice Rojas RN  Initial count Tech: McDavis, Kate   Sponges   Sharps    Initial counts 10   0    Final counts 10   0    Final count RN: Daniela Rojas RN  Final count MD: Adri Tapia CNM       Lacerations    Episiotomy: None              [facility]  part of [facility]    Vaginal Delivery Note    Christy Cespedes Patient Status:  Inpatient    3/10/1995 MRN [id number]   Location [facility] Attending Devika Macedo CNM   Hosp Day # 1 PCP David Patel MD     Delivery      Infant  Date of Delivery: 7/8/2024    Time of Delivery: 4:48 AM   Delivery Type:      Infant Sex/Birthweight: male, 3940g    Presentation Vertex [1]   Position Right [3]  Occiput [1]  Anterior [1]     Apgars:  1 minute:   8               5 minutes:             9             10 minutes:      Placenta  Date/Time of Delivery: 7/8/2024  4:52 AM    Delivery: spontaneous  Placenta to Pathology: yes  Cord Gases Submitted: yes  Cord Blood Collection: yes  Cord Tissue Collection: yes  Cord Complications: none  Sponge and Needle Counts:  Verified yes    Maternal Anesthesia: none   Episiotomy/Laceration Repair  Laceration: none    Delivery Complications  Large blood clot delivered with the baby, amount almost filled large basin on table    Neonatologist Present: no  Delivery Comment:   Christy felt strong urge to push at about 9 cms. Successfully pushed cervix away and pushed to viable baby boy. See Delivery Summary for time, APGARs, and weight. Fetal head presented in the CRISTIAN position. Sweep for nuchal cord was performed and no nuchal cord identified. Anterior shoulder delivered spontaneously without traction. Baby vigorous at birth. To maternal abdomen for dry and stimulation then cord cut after pulsing ceased. Prophylactic IV pitocin initiated in 3rd stage. Spontaneous placenta by Doncan mechanism, complete with 3 vessel cord. Sent to pathology due to blood clot at birth. Hemostasis achieved by fundal massage, IV Pitocin which was increased to 600mUnits, and Methergine IM. Vagina and perineum inspected and intact. Mother and infant appeared in stable condition when midwife left the delivery room. Sharps and 4x4 counts were correct. Skin to skin initiated.    Quantitative Blood Loss (mL)   1602      Adri Tapia CNM   7/8/2024  5:06 AM

## 2024-07-08 NOTE — PROGRESS NOTES
Received patient into room 364 via wheelchair . Bedside shift report received from CARMEN Amador. Pt transferred to bed. Bed in lowest and locked position. Side rails up x2. VSS. IV site unremarkable. Baby present in open crib.  ID bands matched with L&D RN.  Patient and family oriented to unit, room and call light within reach.  Safety measures in place, POC followed. Plan of care ongoing.    Advised to call for assistance to bathroom.

## 2024-07-08 NOTE — CONSULTS
[facility]  Obstetrics and Gynecology Consultation     Christy Cespedes Patient Status:  Inpatient    3/10/1995 MRN [id number]   Location [facility] Attending Devika Macedo CNM   Hospital Day 0 PCP David Patel MD     Reason for Consultation: Breech presentation during induction of labor for polyhydramnios.       Subjective:     HPI: Christy Cespedes is a 29 year old  female with breech presentation during induction of labor necessitating OB consultation. Patient noted she is feeling painful contractions every few minutes. AROM performed earlier at about 1630 and copious amounts of fluid released. + fetal movement.     Discussed risks, benefits, and alternatives of External Cephalic Version (ECV) discussed. Patient agreed without epidural. If not successful desires epidural and second try.     OB History:  OB History    Para Term  AB Living   3 2 2 0 0 2   SAB IAB Ectopic Multiple Live Births   0 0 0 0 2       Meds:  No current facility-administered medications on file prior to encounter.     Current Outpatient Medications on File Prior to Encounter   Medication Sig Dispense Refill    cetirizine 10 MG Oral Tab Take 1 tablet (10 mg total) by mouth daily.      Ferrous Sulfate 325 (65 Fe) MG Oral Tab Take 1 tablet (325 mg total) by mouth every other day. 30 tablet 3    levothyroxine 50 MCG Oral Tab Take one tab daily - , 2 tablets  and Sundays 105 tablet 3    pyridoxine 50 MG Oral Tab Take 1 tablet (50 mg total) by mouth daily.      prenatal vitamin w/DHA 27-0.8-228 MG Oral Cap Take 1 capsule by mouth daily.      MONTELUKAST 10 MG Oral Tab TAKE 1 TABLET(10 MG) BY MOUTH DAILY 90 tablet 0    clindamycin 1 % External Lotion Apply twice daily with flares of pus numps (Patient not taking: Reported on 2024) 60 mL 11    ondansetron (ZOFRAN) 4 mg tablet Take 1 tablet (4 mg total) by mouth every 8 (eight) hours as needed for  Nausea. (Patient not taking: Reported on 5/21/2024) 30 tablet 0    triamcinolone 0.1 % External Cream Apply 1 Application topically 2 (two) times daily. Use for 2 weeks and then decrease to using up to bid Monday-Friday only with flares (Patient not taking: Reported on 5/21/2024) 454 g 0    levothyroxine 50 MCG Oral Tab Take 1 tablet (50 mcg total) by mouth before breakfast. 90 tablet 0    albuterol 108 (90 Base) MCG/ACT Inhalation Aero Soln Inhale 2 puffs into the lungs every 6 (six) hours as needed for Wheezing or Shortness of Breath. 18 g 0        All:  Allergies   Allergen Reactions    Shellfish DIARRHEA, NAUSEA AND VOMITING, PALPITATIONS, DIZZINESS, SHORTNESS OF BREATH and ANXIETY     Oyster sauce    Lactose DIARRHEA    Seasonal ITCHING       PMH:  Past Medical History:    Anemia    Anxiety    Depression    Hair loss    Hypothyroidism       PSH:  Past Surgical History:   Procedure Laterality Date    Tonsillectomy         SH:  Social History     Socioeconomic History    Marital status:      Spouse name: Not on file    Number of children: Not on file    Years of education: Not on file    Highest education level: Not on file   Occupational History    Not on file   Tobacco Use    Smoking status: Never    Smokeless tobacco: Never   Substance and Sexual Activity    Alcohol use: Not Currently     Comment: occ prior to pregnancy     Drug use: No    Sexual activity: Yes     Comment: NONE   Other Topics Concern     Service Not Asked    Blood Transfusions Not Asked    Caffeine Concern Yes     Comment: coffee    Occupational Exposure Not Asked    Hobby Hazards Not Asked    Sleep Concern Not Asked    Stress Concern Not Asked    Weight Concern Not Asked    Special Diet Not Asked    Back Care Not Asked    Exercise Not Asked    Bike Helmet Not Asked    Seat Belt Not Asked    Self-Exams Not Asked    Grew up on a farm Not Asked    History of tanning Not Asked    Outdoor occupation Not Asked    Breast feeding No     Reaction to local anesthetic No    Pt has a pacemaker No    Pt has a defibrillator No   Social History Narrative    Not on file     Social Determinants of Health     Financial Resource Strain: Low Risk  (7/7/2024)    Financial Resource Strain     Difficulty of Paying Living Expenses: Not hard at all     Med Affordability: No   Food Insecurity: No Food Insecurity (7/7/2024)    Food Insecurity     Food Insecurity: Never true   Transportation Needs: No Transportation Needs (7/7/2024)    Transportation Needs     Lack of Transportation: No     Car Seat: Yes   Stress: No Stress Concern Present (7/7/2024)    Stress     Feeling of Stress : No   Housing Stability: Low Risk  (7/7/2024)    Housing Stability     Housing Instability: No     Housing Instability Emergency: Not on file     Crib or Bassinette: Yes       FH:  Family History   Problem Relation Age of Onset    Lipids Father     Ovarian Cancer Mother 42        BRCA neg    Other (Other) Mother         asthma     Prostate Cancer Maternal Grandfather     Breast Cancer Paternal Grandmother        Review of Systems:  General: no complaints  Eyes: no complaints  Respiratory: no complaints  Cardiovascular: no complaints  GI: no complaints  :See HPI  Hematological/lymphatic: no complaints  Psychiatric: no complaints  Endocrine:no complaints  Neurological: no complaints  Immunological: no complaints  Musculoskeletal:no complaints      Objective:     Vitals:    07/07/24 1850   BP:    Pulse:    Resp:    Temp: 98.2 °F (36.8 °C)         Exam:   GENERAL: well developed, well nourished, in no apparent distress, alert and orientated X 3  PSYCH: mood and affect stable   SKIN: no new rashes, no new lesions  LUNGS: respirations non-labored  CARDIOVASCULAR: no peripheral edema or varicosities, skin warm and dry  ABDOMEN: Soft, non distended; non tender, no masses, gravid.   EXTREMITIES:  Normal range of motion, strength 5/5, nontender without pitting edema.     Labs:  Lab Results    Component Value Date    WBC 7.8 2024    HGB 11.8 2024    HCT 33.7 2024    .0 2024     Fetal Heart rate tracing:   Baseline 140 BPM, moderate variability, + accelerations, Variable decelerations. [place] contractions Q 2-3 min.     Imaging:  Bedside ultrasound revealed a fetus in breech presentation with head maternal right and back maternal left. Posterior placenta.      Assessment:      Christy Cespedes is a 29 year old  female who was admitted on 2024 for induction of labor 2/2 polyhydramnios and found to be breech presentation during induction of labor. Request for OBGYN consultation was made due to breech presentation.     Patient Active Problem List   Diagnosis    Hypothyroidism    History of delivery by vacuum extraction, currently pregnant ([facility])    History of postpartum hemorrhage    Vitamin D deficiency    Menorrhagia with regular cycle    Ovarian cyst    Vaginal relaxation    Polyhydramnios affecting pregnancy in third trimester ([facility])    COVID-19 affecting pregnancy in third trimester ([facility])    Group B Streptococcus carrier, antepartum ([facility])    Pregnancy ([facility])    Unstable lie of fetus ([facility])    Encounter for induction of labor ([facility])         Plan:     Breech presentation during induction of labor.   - Risks, benefits, alternatives discussed with patient and partner for ECV. Patient understood and agreed to ECV.   - ECV successful. See procedure note.   - Continue induction of labor with pitocin.   - High abdominal binder.     All of the findings and plan were discussed with the patient.  She notes understanding and agrees with the plan of care.  All questions were answered to the best of my ability at this time.    Dr. Rao Emmanuel MD    [facility]     This note was created by Dragon voice recognition. Errors in content may be related to improper recognition by the system; efforts to review and correct have been done but errors may still exist. Please be advised  the primary purpose of this note is for me to communicate medical care. Standard sentence structure is not always used. Medical terminology and medical abbreviations may be used. There may be grammatical, typographical, and automated fill ins that may have errors missed in proofreading.

## 2024-07-09 VITALS
SYSTOLIC BLOOD PRESSURE: 106 MMHG | RESPIRATION RATE: 16 BRPM | TEMPERATURE: 99 F | DIASTOLIC BLOOD PRESSURE: 63 MMHG | OXYGEN SATURATION: 99 % | HEART RATE: 76 BPM

## 2024-07-09 LAB
BASOPHILS # BLD AUTO: 0.02 X10(3) UL (ref 0–0.2)
BASOPHILS NFR BLD AUTO: 0.2 %
DEPRECATED RDW RBC AUTO: 46.2 FL (ref 35.1–46.3)
EOSINOPHIL # BLD AUTO: 0.12 X10(3) UL (ref 0–0.7)
EOSINOPHIL NFR BLD AUTO: 1.4 %
ERYTHROCYTE [DISTWIDTH] IN BLOOD BY AUTOMATED COUNT: 13.7 % (ref 11–15)
HCT VFR BLD AUTO: 24.2 %
HGB BLD-MCNC: 8.1 G/DL
IMM GRANULOCYTES # BLD AUTO: 0.08 X10(3) UL (ref 0–1)
IMM GRANULOCYTES NFR BLD: 0.9 %
LYMPHOCYTES # BLD AUTO: 1.79 X10(3) UL (ref 1–4)
LYMPHOCYTES NFR BLD AUTO: 20.6 %
MCH RBC QN AUTO: 30.9 PG (ref 26–34)
MCHC RBC AUTO-ENTMCNC: 33.5 G/DL (ref 31–37)
MCV RBC AUTO: 92.4 FL
MONOCYTES # BLD AUTO: 0.68 X10(3) UL (ref 0.1–1)
MONOCYTES NFR BLD AUTO: 7.8 %
NEUTROPHILS # BLD AUTO: 6.01 X10 (3) UL (ref 1.5–7.7)
NEUTROPHILS # BLD AUTO: 6.01 X10(3) UL (ref 1.5–7.7)
NEUTROPHILS NFR BLD AUTO: 69.1 %
PLATELET # BLD AUTO: 159 10(3)UL (ref 150–450)
RBC # BLD AUTO: 2.62 X10(6)UL
WBC # BLD AUTO: 8.7 X10(3) UL (ref 4–11)

## 2024-07-09 NOTE — DISCHARGE INSTRUCTIONS
Discharge Instructions for Vaginal Delivery  Follow-up  Schedule a  follow-up exam with the midwife who delivered you in 2 weeks and 6 weeks. Please remember to call as soon as possible for this appointment. After having a baby, your body may be very tired. It can take time to recover from a vaginal delivery. Please remember this and call if you have any questions or concerns before your 6 week appointment.   Medications    Please take Motrin at home for pain control 600mg every 6 hours as needed( next dose due at 6pm)  Continue taking your Prenatal Vitamin daily, as long as you are nursing  Ferrous Sulfate 325 mg once per day (may obtain in form of Gentle Iron, Slow FE, or liquid Floradix)  Vitamin D3 2000 IU daily  Colace (stool softener)  once or twice per day as needed            -Pelvic rest for 6 weeks; no sex, tampons, douching, baths, or pools until after 6 weeks check-up.  -No heavy lifting; increase activity gradually.  -No driving if taking narcotics.    CALL YOUR PROVIDER IF:  Increased/heavy bleeding (I.e. Changing a saturated pad every hour).  New onset chills/fever greater than 100.4.  Pain in your vagina or abdomen that gets worse and isn't relieved with medicine.  Swelling or discharge with a bad odor from vagina.  Burning, pain, red streaks, or lumpy areas in your breasts that may be accompanied by flu-like symptoms.  Painful urination, or inability to control urination.  Nausea, vomiting, dizziness, or fainting.  Feelings of extreme sadness or anxiety, or a feeling that you don’t want to be with your baby.  Redness, warmth, or pain in the lower leg.  Chest pain or shortness of breath.      Mom & Baby Hour: Meets in person every WEDNESDAY at 10am at the [facility] in [place] ([address]) in the community education room. The group is for new moms and their babies up to 6 months of age. It includes breastfeeding supports with a certified lactation educator to be  available to answer your breastfeeding questions.   If you have stitches  You may have received stitches in the skin near your vagina. The stitches might have closed an episiotomy (an incision that enlarges the opening of the vagina). Or you may have needed stitches to repair torn skin. Either way, your stitches should dissolve within weeks. Until then, you can help reduce discomfort, aid healing, and reduce your risk of infection by keeping the stitches clean. These tips can help:  Gently wipe from front to back after you urinate or have a bowel movement.  After wiping, spray warm water on the area. Or you can have a sitz bath. This means sitting in a tub with a few inches of water in it. Then pat the area dry or use a hairdryer on a cool setting.  You can take a shower unless told not to.  Change sanitary pads at least every 2 to 4 hours.  Place cold packs as need, but remember to keep a thin towel between the pack and your skin.  Activity  Here are some suggestions:  Get lots of rest. Take naps as often as your can.   Increase your activities gradually.   Don’t have sexual intercourse until after you’ve had a follow-up appointment and you have decided on a birth control method.  Remember your are on pelvic rest, so nothing in your vagina (tampons etc...), no tub baths, and no swimming pools.       When to call your healthcare provider  Call your health care provider right away if you have:  A fever of 100.4°F (38.0°C) or higher  Bleeding that requires a new sanitary pad after an hour, or large blood clots. Remember your bleeding will wax and wane. Please call if you are consistently saturating a pad and hour.   Pain in your vagina that gets worse and isn't relieved with medicine.  Burning, pain, red streaks, or lumpy areas in your breasts that may be accompanied by flu-like symptoms  Nausea or vomiting  Dizziness or fainting  Feelings of extreme sadness or anxiety, or a feeling that you don’t want to be with your  baby  Abdominal pain that isn’t relieved with medicine  No bowel movement for 5 days  Redness, warmth, or pain in the lower leg  Chest pain

## 2024-07-09 NOTE — PROGRESS NOTES
.[facility]  part of [facility]    OB/GYNE Progress Note      Christy Cespedes Patient Status:  Inpatient    3/10/1995 MRN [id number]   Location [facility] 3SE Attending Devika Macedo, ANGELIQUE   Hosp Day # 2 PCP David Patel MD        Assessment/Plan   Christy Cespedes is a 29 year old , day 1 after a vaginal delivery  Formula feeding without difficulty   Discharge home today  Postpartum teaching completed including danger signs and when to call the CNM    Normal vaginal delivery   Normal PPD#1      Hypothyroidism   Stable on levothyroxine      History of delivery by vacuum extraction, currently pregnant ([facility])   Delivered     Postpartum hemorrhage  Bleeding stable at this time  Hgb 8.1 today      Polyhydramnios affecting pregnancy in third trimester ([facility])   Delivered      COVID-19 affecting pregnancy in third trimester ([facility])   Delivered      Group B Streptococcus carrier, antepartum ([facility])   Delivered      Unstable lie of fetus ([facility])   Delivered      Encounter for induction of labor ([facility])   Delivered      Third-stage postpartum hemorrhage ([facility])   Bleeding stable    Hgb 8.1    Postpartum anemia   Venofer ordered   Patient declines second dose of venofer tomorrow as desires discharge home   Continue oral iron at home      Discussed with: nurse     Patient and parent     Plan discussed with patient who verbalizes understanding and agreement.        Subjective   Currently she denies excessive bleeding or pain. No clots. Denies SOB, chest pain, HA, dizziness. + void. + stool  Support at home: partner and family  Has car seat   Partner planning vasectomy for birth control. Condoms in the interim    Review of Systems:  Constitutional: Denies   Genitourinary: Denies   Respiratory: Denies   Psychiatric: Denies         Objective   Vital signs in last 24 hours:  Temp:  [98.5 °F (36.9 °C)] 98.5 °F (36.9 °C)  Pulse:  [76-80] 76  Resp:  [14-16] 16  BP: (106-117)/(63-70)  106/63    Input/Output:  No intake or output data in the 24 hours ending 07/09/24 1214    Weight (Last 6):  Wt Readings from Last 6 Encounters:   07/02/24 186 lb (84.4 kg)   06/25/24 189 lb (85.7 kg)   06/20/24 183 lb (83 kg)   06/18/24 183 lb 9.6 oz (83.3 kg)   06/04/24 182 lb (82.6 kg)   05/23/24 177 lb (80.3 kg)     There is no height or weight on file to calculate BMI.     Exam:  Constitutional: Comfortable and bonding well with infant   Uterus: Fundus firm, below umbilicus   Wound/Incision: Well-approximated, no swelling or edema, small lochia rubra, no clots   Respiratory: Unlabored respirations   Extremities: No edema. No evidence of DVT on exam   Psychiatric: Calm affect, appropriate     DVT Risk Score: Low risk        Results:     Lab Results   Component Value Date    TREPONEMALAB Nonreactive 07/07/2024    ABO A 07/07/2024    RH Positive 07/07/2024    WBC 8.7 07/09/2024    HGB 8.1 (L) 07/09/2024    HCT 24.2 (L) 07/09/2024    .0 07/09/2024    CREATSERUM 0.58 12/22/2023    BUN 8 12/22/2023     12/22/2023    K 3.7 12/22/2023     12/22/2023    CO2 25 12/22/2023    GLU 89 12/22/2023    CA 9.6 12/22/2023    ALB 4.0 12/22/2023    ALKPHO 37 12/22/2023    BILT 0.3 12/22/2023    TP 6.3 12/22/2023    AST 18 02/08/2024    ALT 11 02/08/2024    T4F 0.9 07/20/2022    TSH 0.805 02/08/2024       Lab Results   Component Value Date    B12 547 08/04/2021    COLORUR Yellow 05/13/2016    CLARITY Clear 05/13/2016    SPECGRAVITY 1.020 07/06/2022    PROUR Negative 05/13/2016    GLUUR Negative 05/13/2016    KETUR Negative 05/13/2016    BILUR Negative 05/13/2016    BLOODURINE Negative 05/13/2016    NITRITE Negative 07/06/2022    UROBILINOGEN <2.0 05/13/2016    LEUUR Negative 05/13/2016       No results found.          Indira Hawk CNM  7/9/2024  12:14 PM

## 2024-07-09 NOTE — PLAN OF CARE
Problem: BIRTH - VAGINAL/ SECTION  Goal: Fetal and maternal status remain reassuring during the birth process  Description: INTERVENTIONS:  - Monitor vital signs  - Monitor fetal heart rate  - Monitor uterine activity  - Monitor labor progression (vaginal delivery)  - DVT prophylaxis (C/S delivery)  - Surgical antibiotic prophylaxis (C/S delivery)  Outcome: Progressing     Problem: PAIN - ADULT  Goal: Verbalizes/displays adequate comfort level or patient's stated pain goal  Description: INTERVENTIONS:  - Encourage pt to monitor pain and request assistance  - Assess pain using appropriate pain scale  - Administer analgesics based on type and severity of pain and evaluate response  - Implement non-pharmacological measures as appropriate and evaluate response  - Consider cultural and social influences on pain and pain management  - Manage/alleviate anxiety  - Utilize distraction and/or relaxation techniques  - Monitor for opioid side effects  - Notify MD/LIP if interventions unsuccessful or patient reports new pain  - Anticipate increased pain with activity and pre-medicate as appropriate  Outcome: Progressing     Problem: ANXIETY  Goal: Will report anxiety at manageable levels  Description: INTERVENTIONS:  - Administer medication as ordered  - Teach and rehearse alternative coping skills  - Provide emotional support with 1:1 interaction with staff  Outcome: Progressing     Problem: Patient Centered Care  Goal: Patient preferences are identified and integrated in the patient's plan of care  Description: Interventions:  - What would you like us to know as we care for you? It's a boy!  - Provide timely, complete, and accurate information to patient/family  - Incorporate patient and family knowledge, values, beliefs, and cultural backgrounds into the planning and delivery of care  - Encourage patient/family to participate in care and decision-making at the level they choose  - Honor patient and family  perspectives and choices  Outcome: Progressing     Problem: Patient/Family Goals  Goal: Patient/Family Long Term Goal  Description: Patient's Long Term Goal: Uncomplicated Delivery     Interventions:  - Assessment/Monitoring  - Induction/Augmentation per protocol and Provider order  - C/S per protocol and Provider order   - Education  - Intervention per protocol and Provider order with education   - Involve patient in POC  - See additional Care Plan goals for specific interventions  Outcome: Progressing  Goal: Patient/Family Short Term Goal  Description: Patient's Short Term Goal: Comfort and Pain Control     Interventions:   - Non Pharmacological pain intervention   - IV/IM and Epidural pain medication per Provider order and patient request  - Education  - Involve Patient in POC   - See additional Care Plan goals for specific interventions  Outcome: Progressing     Problem: POSTPARTUM  Goal: Long Term Goal:Experiences normal postpartum course  Description: INTERVENTIONS:  - Assess and monitor vital signs and lab values.  - Assess fundus and lochia.  - Provide ice/sitz baths for perineum discomfort.  - Monitor healing of incision/episiotomy/laceration, and assess for signs and symptoms of infection and hematoma.  - Assess bladder function and monitor for bladder distention.  - Provide/instruct/assist with pericare as needed.  - Provide VTE prophylaxis as needed.  - Monitor bowel function.  - Encourage ambulation and provide assistance as needed.  - Assess and monitor emotional status and provide social service/psych resources as needed.  - Utilize standard precautions and use personal protective equipment as indicated. Ensure aseptic care of all intravenous lines and invasive tubes/drains.  - Obtain immunization and exposure to communicable diseases history.  Outcome: Progressing  Goal: Optimize infant feeding at the breast  Description: INTERVENTIONS:  - Initiate breast feeding within first hour after birth.   -  Monitor effectiveness of current breast feeding efforts.  - Assess support systems available to mother/family.  - Identify cultural beliefs/practices regarding lactation, letdown techniques, maternal food preferences.  - Assess mother's knowledge and previous experience with breast feeding.  - Provide information as needed about early infant feeding cues (e.g., rooting, lip smacking, sucking fingers/hand) versus late cue of crying.  - Discuss/demonstrate breast feeding aids (e.g., infant sling, nursing footstool/pillows, and breast pumps).  - Encourage mother/other family members to express feelings/concerns, and actively listen.  - Educate father/SO about benefits of breast feeding and how to manage common lactation challenges.  - Recommend avoidance of specific medications or substances incompatible with breast feeding.  - Assess and monitor for signs of nipple pain/trauma.  - Instruct and provide assistance with proper latch.  - Review techniques for milk expression (breast pumping) and storage of breast milk. Provide pumping equipment/supplies, instructions and assistance, as needed.  - Encourage rooming-in and breast feeding on demand.  - Encourage skin-to-skin contact.  - Provide LC support as needed.  - Assess for and manage engorgement.  - Provide breast feeding education handouts and information on community breast feeding support.   Outcome: Progressing  Goal: Establishment of adequate milk supply with medication/procedure interruptions  Description: INTERVENTIONS:  - Review techniques for milk expression (breast pumping).   - Provide pumping equipment/supplies, instructions, and assistance until it is safe to breastfeed infant.  Outcome: Progressing  Goal: Experiences normal breast weaning course  Description: INTERVENTIONS:  - Assess for and manage engorgement.  - Instruct on breast care.  - Provide comfort measures.  Outcome: Progressing  Goal: Appropriate maternal -  bonding  Description:  INTERVENTIONS:  - Assess caregiver- interactions.  - Assess caregiver's emotional status and coping mechanisms.  - Encourage caregiver to participate in  daily care.  - Assess support systems available to mother/family.  - Provide /case management support as needed.  Outcome: Progressing

## 2024-07-09 NOTE — DISCHARGE SUMMARY
[facility]  part of [facility]    Discharge Summary    Christy Cespedes Patient Status:  Inpatient    3/10/1995 MRN [id number]   Location [facility] 3SE Attending Devika Macedo CNM   Hosp Day # 2       Delivering OB Clinician: Adri Tapia CNM    EDC: Estimated Date of Delivery: 24    Gestational Age: 39w1d    Antepartum complications:   Patient Active Problem List   Diagnosis    Hypothyroidism    History of delivery by vacuum extraction, currently pregnant ([facility])    History of postpartum hemorrhage    Vitamin D deficiency    Menorrhagia with regular cycle    Ovarian cyst    Vaginal relaxation    Polyhydramnios affecting pregnancy in third trimester ([facility])    COVID-19 affecting pregnancy in third trimester ([facility])    Group B Streptococcus carrier, antepartum ([facility])    Pregnancy ([facility])    Unstable lie of fetus ([facility])    Encounter for induction of labor ([facility])     (spontaneous vaginal delivery) ([facility])    Third-stage postpartum hemorrhage ([facility])       Date of Delivery: 2024  Time of Delivery: 4:48 AM     Delivery Type: spontaneous vaginal delivery    Baby: Liveborn male   Information for the patient's :  Audie Cespedes [[id number]]   8 lb 11 oz (3.94 kg)   Apgars:  1 minute: 8   5 minutes: 9 10 minutes:       Intrapartum Complications: Postpartum Hemorrhage    Admit Date: 2024    Discharge Date: 2024    Hospital Course: No complications. Routine delivery and postpartum care    Discharged Condition: stable    Disposition: home    Plan:     Follow-up appointment in 2 weeks with ANGELIQUE Avila CNM  2024  12:24 PM

## 2024-07-09 NOTE — PLAN OF CARE
Problem: POSTPARTUM  Goal: Long Term Goal:Experiences normal postpartum course  Description: INTERVENTIONS:  - Assess and monitor vital signs and lab values.  - Assess fundus and lochia.  - Provide ice/sitz baths for perineum discomfort.  - Monitor healing of incision/episiotomy/laceration, and assess for signs and symptoms of infection and hematoma.  - Assess bladder function and monitor for bladder distention.  - Provide/instruct/assist with pericare as needed.  - Provide VTE prophylaxis as needed.  - Monitor bowel function.  - Encourage ambulation and provide assistance as needed.  - Assess and monitor emotional status and provide social service/psych resources as needed.  - Utilize standard precautions and use personal protective equipment as indicated. Ensure aseptic care of all intravenous lines and invasive tubes/drains.  - Obtain immunization and exposure to communicable diseases history.  7/9/2024 1107 by Bernice Santo, RN  Outcome: Progressing  7/9/2024 1106 by Bernice Santo, RN  Outcome: Progressing  Goal: Optimize infant feeding at the breast  Description: INTERVENTIONS:  - Initiate breast feeding within first hour after birth.   - Monitor effectiveness of current breast feeding efforts.  - Assess support systems available to mother/family.  - Identify cultural beliefs/practices regarding lactation, letdown techniques, maternal food preferences.  - Assess mother's knowledge and previous experience with breast feeding.  - Provide information as needed about early infant feeding cues (e.g., rooting, lip smacking, sucking fingers/hand) versus late cue of crying.  - Discuss/demonstrate breast feeding aids (e.g., infant sling, nursing footstool/pillows, and breast pumps).  - Encourage mother/other family members to express feelings/concerns, and actively listen.  - Educate father/SO about benefits of breast feeding and how to manage common lactation challenges.  - Recommend avoidance of specific  medications or substances incompatible with breast feeding.  - Assess and monitor for signs of nipple pain/trauma.  - Instruct and provide assistance with proper latch.  - Review techniques for milk expression (breast pumping) and storage of breast milk. Provide pumping equipment/supplies, instructions and assistance, as needed.  - Encourage rooming-in and breast feeding on demand.  - Encourage skin-to-skin contact.  - Provide LC support as needed.  - Assess for and manage engorgement.  - Provide breast feeding education handouts and information on community breast feeding support.   2024 110 by Bernice Santo RN  Outcome: Progressing  2024 by Bernice Santo RN  Outcome: Progressing  Goal: Establishment of adequate milk supply with medication/procedure interruptions  Description: INTERVENTIONS:  - Review techniques for milk expression (breast pumping).   - Provide pumping equipment/supplies, instructions, and assistance until it is safe to breastfeed infant.  2024 by Bernice Santo RN  Outcome: Progressing  2024 by Bernice Santo RN  Outcome: Progressing  Goal: Experiences normal breast weaning course  Description: INTERVENTIONS:  - Assess for and manage engorgement.  - Instruct on breast care.  - Provide comfort measures.  2024 by Berince Santo RN  Outcome: Progressing  2024 by Bernice Santo RN  Outcome: Progressing  Goal: Appropriate maternal -  bonding  Description: INTERVENTIONS:  - Assess caregiver- interactions.  - Assess caregiver's emotional status and coping mechanisms.  - Encourage caregiver to participate in  daily care.  - Assess support systems available to mother/family.  - Provide /case management support as needed.  2024 by Bernice Santo RN  Outcome: Progressing  2024 by Bernice Santo RN  Outcome: Progressing

## 2024-07-10 ENCOUNTER — TELEPHONE (OUTPATIENT)
Dept: OBGYN CLINIC | Facility: CLINIC | Age: 29
End: 2024-07-10

## 2024-07-10 LAB
BLOOD TYPE BARCODE: 6200
UNIT VOLUME: 350 ML

## 2024-07-10 NOTE — TELEPHONE ENCOUNTER
Patient verified name and     Patient was discharged yesterday, noticing some swelling at IV site. Feeling tenderness and some slight bruising. Denies shortness of breath, chills or fevers. Patient encouraged to elevate and ice extremity. If symptoms worsen or develops shortness of breath or increase redness/tenderness patient to go to ER for evaluation. Voiced understanding and agreed.

## 2024-07-10 NOTE — TELEPHONE ENCOUNTER
Patient was discharge yesterday and were the IV was there is  swelling ,  patient is asking to speak to nurse

## 2024-07-13 ENCOUNTER — TELEPHONE (OUTPATIENT)
Dept: OBGYN CLINIC | Facility: CLINIC | Age: 29
End: 2024-07-13

## 2024-07-13 NOTE — TELEPHONE ENCOUNTER
Christy is 5 days postpartum and calls with concern for clogged ducts and redness/heat on forearm.  Reports has redness and heat about 1cm from where IV site was on forearm. This has been present since the day after discharge from the hospital. Today she notes clogged ducts in her arm pit. She also has chills and a temperature of 102. Bleeding has slowed down. Denies increase in pelvic pain. Discussed likely mastitis and possibly cellulitis from IV. Will send dicloxicillin to pharmacy. Instructed to start taking. Also take ibuprofen, increase hydration, rest, ice to breast and forearm, and continue breastfeeding. If worsening symptoms or no improvement over the next 24 hours instructed her to call. Patient states understanding. Antibiotics sent to pharmacy.

## 2024-07-23 ENCOUNTER — TELEMEDICINE (OUTPATIENT)
Dept: OBGYN CLINIC | Facility: CLINIC | Age: 29
End: 2024-07-23

## 2024-07-23 RX ORDER — ACETAMINOPHEN AND CODEINE PHOSPHATE 120; 12 MG/5ML; MG/5ML
0.35 SOLUTION ORAL DAILY
Qty: 28 TABLET | Refills: 11 | Status: SHIPPED | OUTPATIENT
Start: 2024-07-23 | End: 2025-07-23

## 2024-07-23 NOTE — PROGRESS NOTES
Virtual Telephone Check-In    Christy Cespedes verbally consents to a Virtual Check-In visit on 07/23/24.  Patient has been referred to the [facility] website at [email or web address] to review the yearly Consent to Treat document.    Patient understands and accepts financial responsibility for any deductible, co-insurance and/or co-pays associated with this service.    Duration of the service: 20 minutes      Summary of topics discussed:     Subjective: Christy is 2 weeks postpartum after a vaginal delivery   See L&D delivery summary for birth statistics.  She is without concerns today. Bleeding is decreasing and not experiencing any excessive pain.    Breastfeeding successfully and reports infant is experiencing adequate weight gain.    She denies any signs/symptoms of postpartum depression and has adequate family support.  Denies any abuse.    Contraceptive plan: POP    Constitutional: negative; feels well  ENT: negative; denies nasal congestion or sinus pain  Cardiovascular: negative; denies chest pain or palpitations  Respiratory: negative; denies shortness of breath  Gastrointestinal: negative; denies heartburn, abdominal pain, diarrhea or constiptation  Genitourinary: negative; denies dysuria, incontinence, vaginal itching or discharge.    Musculoskeletal: negative; denies back pain.  Skin/Breast: negative; Denies any breast pain, lumps, or discharge.  Denies any skin lesions.  Neurological: negative; denies headaches, extremity weakness or numbness.  Psychiatric: negative; denies depression or anxiety.  Endocrine:  negative; denies any thyroid history; denies excessive thirst or urination.      Objective:   There were no vitals filed for this visit.  Wt Readings from Last 6 Encounters:   07/02/24 186 lb (84.4 kg)   06/25/24 189 lb (85.7 kg)   06/20/24 183 lb (83 kg)   06/18/24 183 lb 9.6 oz (83.3 kg)   06/04/24 182 lb (82.6 kg)   05/23/24 177 lb (80.3 kg)       OBGyn Exam   Appears engaged Respirations  unlabored    Assessment/Plan:   2 weeks postpartum, stable. Breast / Formula feeding without difficulty  Contraception: POP  Return to clinic: 4 weeks    Counseling included:   Signs/symptoms of postpartum depression  Postpartum danger signs  Lactation support and troubleshooting  Maternal and family adaption  Sleep/rest strategies  Sexuality  Infant safety and care  Parenting concerns  Occupational concerns  Contraception    Christy verbalized understanding of plan of care. All questions answered. No barriers to learning identified.         Charlene Canada CNM

## 2024-08-06 ENCOUNTER — TELEPHONE (OUTPATIENT)
Dept: OBGYN UNIT | Facility: HOSPITAL | Age: 29
End: 2024-08-06

## 2024-08-21 ENCOUNTER — POSTPARTUM (OUTPATIENT)
Dept: OBGYN CLINIC | Facility: CLINIC | Age: 29
End: 2024-08-21

## 2024-08-21 VITALS
SYSTOLIC BLOOD PRESSURE: 105 MMHG | BODY MASS INDEX: 24 KG/M2 | WEIGHT: 154 LBS | HEART RATE: 67 BPM | DIASTOLIC BLOOD PRESSURE: 70 MMHG

## 2024-08-21 NOTE — PATIENT INSTRUCTIONS
After Giving Birth: How to Feel Healthy    Helping yourself feel fit is one of the best things you can do for your baby. A little exercise will tone your muscles. You’ll feel stronger and more energized. You’ll also feel more awake and aware. Don’t worry about your weight right now. Your goal is to feel healthy. Part of feeling good is dressing for comfort. If you dress “smart,” you can be a busy new mom and still look great.  Continue Kegel exercises  You may have been told to do Kegel exercises during pregnancy. These exercises strengthen the muscles that are strained by carrying and delivering the baby. You can return to your Kegels as soon as you feel ready. Why not start today? Squeeze your pelvic floor muscles (the ones that control your urine stream) for at least 5 seconds. Relax, then squeeze again. Work your way up to 50 or 100 Kegels a day.  Exercise often  Exercise helps you get in shape. It also strengthens your muscles, so you are better fit for lifting the baby. As an added benefit, exercise gives you a sense that you’re doing something good for yourself. Take your baby for a short walk, or spend 10 minutes stretching. If you were active during pregnancy, you can probably begin light exercise as soon as you feel ready. But be sure to check with your healthcare provider before you begin.  Stay off the scale  For the first month, think about regaining energy and feeling good, not about losing weight. Losing weight too soon can make you feel more tired. Instead, focus on caring for your baby and eating balanced meals. You may lose some weight without even trying, especially if you’re breastfeeding. Once your energy level is back to normal, you can begin to lose weight. A gradual weight loss of 4 or 5 pounds (1.8 or 2.27 kg) a month is safest.  Dress smart  You’ll want to be comfortable during the first days after delivery. Wear a robe, pajamas, or sweats -- whatever feels best. Soon you may want to look  more like your prepregnant self. Do your hair and wear makeup, if you normally do. A loose-fitting dress may feel good. But do yourself a favor: Don’t reach for your jeans. It’s likely to be a month or more before you can wear them. If leaking breasts are a problem, put pads inside your bra and dress in layers. If you’re breastfeeding, shirts that open in front or pullover tops are good choices. A scarf or shawl can be used as a drape if you breastfeed when others are present.  When to call your healthcare provider  Remember to schedule your postpartum visit. If you delivered by , be seen within 2 weeks. For vaginal delivery, be seen 4 to 6 weeks after the birth. Also, call your healthcare provider if you have:  Heavy bleeding  Fever  Redness or persistent lump in breasts  Inability to void or have a bowel movement after 1 week  Severe pain  Worsening depression  Booster last reviewed this educational content on 2018 [employer]. [address] [place] [address]. All rights reserved. This information is not intended as a substitute for professional medical care. Always follow your healthcare professional's instructions.        Nutrition While Breastfeeding  Do I need a special diet for breastfeeding?    You don't have to eat a special diet to make enough milk for your baby. Also, your milk will be of good quality for your baby regardless of what you eat. But your body needs fuel to make breastmilk. So eat your fill of a variety of foods. Breastfeeding isn’t an excuse to eat and drink everything you want. But it’s not a reason to avoid favorite foods either.   Healthy diet for the new mother  A healthy diet is recommended for all women and offers many benefits to the new mother. Choosing a variety of healthy foods creates a pattern for the entire family. Each family member benefits. Women who are breastfeeding need about 500 extra calories per day. Some women might  need more, while others might need less. When choosing foods, use the nutrition chart below as a guide.  Bread, cereal, rice, and pasta Vegetables Fruit   Milk, yogurt, and cheese Meat, poultry, fish,  dry beans, eggs, and nuts Fats, oils, and sweets  (use sparingly)   What’s good for you?  Here are some things to do:  Breastfeeding women need to drink when they feel thirsty. There is no specific amount of water you need to drink to make enough milk.  Follow healthy eating guidelines.  Snack on fruit or low-fat dairy products if you’re hungry between meals.  If your healthcare provider recommends it, keep taking prenatal vitamins.  What’s not good for you?  Here are other things to consider:  Limit fatty foods and foods that are high in sugar (cookies, cakes).  Be aware that what enters your body may pass into your breastmilk. Limit caffeine. It is not just in coffee. It is also in cola, tea, and chocolate.  Limit the amount of fish that may contain mercury, such as shark and swordfish.  Talk with your healthcare provider before taking any medicines. It is important to let your healthcare provider know that you are nursing. Some medicines are not safe with breastfeeding.  Remember: Alcohol, cigarettes, and drugs also affect your breastmilk and your baby. Talk with your healthcare provider.  [employer] last reviewed this educational content on 1/1/2018  © 3837-0573 [employer]. [address] [place] [address], [place] [address]. All rights reserved. This information is not intended as a substitute for professional medical care. Always follow your healthcare professional's instructions.        Breastfeeding: Caring for Yourself  When you have a new little person in your life, it’s easy to forget about yourself. There are new demands on your time. There are also new responsibilities. But it’s important to take care of yourself. This will help you take better care of your new baby.    Healthy habits  Here are some  healthy tips:  Get exercise when you can. If you leak milk, it will help to nurse right before the activity.  Avoid smoking. Smoking is unhealthy for you and may cause you to make less milk. Secondhand smoke is also harmful to your baby.  Talk to your healthcare provider about alcohol, if you choose to drink.  When you’re sick, tell your healthcare provider that you are breastfeeding. Few medicines and illnesses affect breastfeeding, but it is important to check.  Ask your healthcare provider before taking any prescription or over-the-counter medicines, herbs, or supplements.  Comfy clothes  Suggestions for being comfortable when breastfeeding include:  Find a comfortable nursing bra. Many women find underwire uncomfortable. Some stores offer on-site fittings. Ask your healthcare provider or nurse for a referral.  If you have leaking milk, place breast pads inside your bra.  Choose an extra-supportive bra for exercise. Or you can wear two bras at the same time for more support.  Wear loose tops that can be lifted for breastfeeding. You can also buy clothes specially made for breastfeeding moms.  A note about sex  After delivery, it may take a while before your interest in sex returns. Share your feelings with your partner. Your healthcare provider will let you know when it is safe to resume having sex. When you’re ready, know that:  There are several forms of birth control that can be used while breastfeeding. Ask your healthcare provider what to use for pregnancy prevention while you are nursing.  Breastfeeding hormones may cause vaginal dryness. Some women find using a water-based lubricant makes sex more comfortable.  Milk may leak out when you are aroused. Applying pressure on the nipple, using breast pads, or a towel may help with this.  When to call your healthcare provider  Call your healthcare provider if:  You feel overwhelmed and don't know where to turn.  You feel very sad or don’t want to be with your  baby.  You feel like your baby cries all the time and won't be soothed.  You are unable to exercise, or have sex, without discomfort.  You are unsure about a medicine, illness, or activity and its effect on breastfeeding.  [employer] last reviewed this educational content on 2018 The [employer]. [address] [place] [address], [place] [address]. All rights reserved. This information is not intended as a substitute for professional medical care. Always follow your healthcare professional's instructions.        For New Mothers: Staying Fit After Delivery    After you deliver your baby, you can start to exercise when you feel ready. Let your body be your guide. Most women are ready to exercise after 6 weeks, where some women will be ready a few days after giving birth. If you’ve had a  section, you will need more time. Ask your healthcare provider when it is safe to start exercising again.  Exercise tips for new mothers  You can start doing Kegel exercises as soon as you deliver your baby. Do them at least 10 times a day to help avoid bladder problems later on. Kegel exercises help strengthen your pelvic muscles. To do them, squeeze the muscles that you use to stop passing urine (do not do this while urinating). Hold that squeeze for a count of 10, then release.   You will want to resume other exercise gradually and talk to your healthcare provider before starting. Always exercise with care. When you first start exercising after giving birth, try simple exercises that help strengthen major muscle groups, including abdominal and back muscles. Slowly add moderate-intensity exercise. Try to work up to at least 150 minutes of moderate-intensity aerobic activity every week. Moderate intensity means you are moving enough to raise your heart rate and start sweating. You can still talk normally. But you cannot sing. Muscle-strengthening exercises should be done along with your aerobic activity on at  least 2 days a week. Look for ways to combine exercising with being with your new baby. Try putting your baby into a front pack or in the stroller and take a walk.  Strengthening stomach muscles  Many new mothers want to strengthen their stomach muscles after giving birth. Try this exercise when you’re ready to resume your program. It will strengthen the front and side muscles of your stomach:  Lie on your back with your knees bent and feet flat on the floor. Cross your arms over your stomach. Use your fingers to gently pull the sides of the stomach toward the middle of your body.  Exhale and try to pull the stomach muscles toward your spine. Gently raise your shoulders off the floor, no more than 6 to 8 inches. Hold for 5 seconds. Repeat 5 times.  [employer] last reviewed this educational content on 2/1/2018 © 2000-2020 [employer]. [address] [place] [address]. All rights reserved. This information is not intended as a substitute for professional medical care. Always follow your healthcare professional's instructions.        Understanding Postpartum Depression  You’ve just had a baby. You expected to be excited and happy. But instead you find yourself crying for no reason. You may have trouble coping with your daily tasks. You feel sad, tired, and hopeless most of the time. You may even feel ashamed or guilty. But what you’re going through is not your fault and you can feel better. Talk with your healthcare provider. He or she can help.     What is depression?  Depression is a mood disorder that affects the way you think and feel. The most common symptom is a feeling of deep sadness. You may also feel as if you just can’t cope with life. Other symptoms include:   Gaining or losing a lot of weight  Sleeping too much or too little  Feeling tired all the time  Feeling restless  Crying a lot  Having too little or too much appetite.  Withdrawing from friends and family  Having headaches, aches  and pains, or stomach problems that won't go away  Feeling anger or rage  Fears of harming your baby  Lack of interest in your baby  Feeling worthless or guilty  No longer finding pleasure in things you used to  Having trouble thinking clearly or making decisions  Thinking about death or suicide  Depression after childbirth  You may be weepy and tired right after giving birth. These feelings are normal. They’re sometimes called the “baby blues.” These blues go away after 1 to 2 weeks. However, postpartum (meaning “after birth”) depression lasts much longer and is more severe than the baby blues. It can make you feel sad and hopeless. You may also fear that your baby will be harmed and worry about being a bad mother.   What causes postpartum depression?  The exact cause of postpartum depression is unknown. Changes in brain chemistry or structure are believed to play a big role in depression. It may be due to changes in your hormones during and after childbirth. You may also be tired from caring for your baby and adjusting to being a mother. All these factors may make you feel depressed. In some cases, your genes may also play a role.   Depression can be treated  There are many ways to treat postpartum depression. Talking to your healthcare provider is the first step toward feeling better.   When to call your healthcare provider   Call your healthcare provider if you:    Cry for no clear reason  Have trouble sleeping, eating, and making choices  Question if you can handle caring for a baby  Have intense feelings of sadness, anxiety, or despair that prevent you from being able to do your daily tasks  To learn more  National [place] of Mental Health, 282.539.2895, www.nimh.nih.gov  National [place] on Mental Illness, 498.213.7751, www.tiffani.org  Mental [facility], 868.671.1157, www.nmha.org  National Suicide Hotline, 553-SUICIDE (224-019-8628)  National Suicide Prevention Lifeline, 768.537.7114,  www.suicidepreventionlifeline.org    Crystal last reviewed this educational content on 5/1/2020 © 2000-2020 The [employer], [employer]. [address] [place] [address], [place] [address]. All rights reserved. This information is not intended as a substitute for professional medical care. Always follow your healthcare professional's instructions.        Expressing Your Milk  Work, school, or even a late-night movie can require you to be away from your baby. This doesn't mean you have to give up breastfeeding. Feeding your baby from your breasts is ideal. If you must be away from your baby, you can express milk from your breast. Talk with your healthcare provider about the best ways to feed expressed breastmilk to your infant. But remember, don’t give your baby bottles or pacifiers until he or she is about 4 to 6 weeks old, unless required sooner for health reasons. This helps you both get a good start on breastfeeding. Your baby can get used to your natural nipple first.      Expressing by hand       Expressing with double pump      Always wash your hands before expressing milk from your breast.  Stimulating letdown  Hold a washcloth under very warm water and wring it out.  Place one warm washcloth over each breast to warm them.   Gently massage your breasts to stimulate the milk flow.  Start under the arm and move around the entire breast.   Use the backs of your fingernails to gently scratch the skin of your breasts downward from the outside toward your nipples.   If you’re away from your baby, looking at your baby’s picture can help your milk let down.    Expressing by hand or pump  Your lactation consultant can help you choose the best method for your needs. Here are some tips:   Expressing by hand reduces pressure in swollen or leaky breasts. It may be a good way to start a pumping session. If you need to give expressed milk to your baby in the first few days after delivery, hand expression can often help get more  colostrum than using a pump. Ask your nurse or midwife to teach you how to hand express.  Start expressing within 6 hours of separation from your baby in the hospital.  When  from your baby, it's best to express as often and as long as a baby would breastfeed. Newborns feed 8 to 12 times each 24 hours.  A pump gently pulls your nipple into the cup like a baby’s suck and can be the fastest way to express after your milk comes in. Pumps come in manual, battery-operated, and electric styles. To protect your breasts and the milk you pump, follow the instructions that come with your pump.  For sick or premature babies who aren't feeding at the breast, \"hands-on pumping\" is a special way to help be sure you make enough milk. Hands-on pumping involves a combination of both hand expression and an electrical pump.   Hand expressing while using a pump can increase the amount of milk you can pump. It can also increase the fat content of the pumped milk.  You can usually buy or rent a pump from a drugstore or medical equipment store. Check with your hospital to find out where you can buy or rent a pump.  Working and breastfeeding  Breastfeed your baby all of the time during your maternity leave. This helps set up your milk supply for the whole year.  When your baby is about 2 weeks old, start pumping after you feed the baby. You can freeze this expressed milk. It will help you build a supply for going back to work. Nursing plus pumping will help your breasts make more milk. Feeding your baby from your breasts is ideal. If you must be away from your baby, you can express milk from your breast. Talk with your healthcare provider about the best ways to feed expressed breastmilk to your infant.    Express milk during work breaks. This helps protect your milk supply. It also helps prevent engorged or leaking breasts.  Arrange to breastfeed at lunch if your childcare is nearby. If not, be sure to pump during your lunch  break.  Breastfeed before you leave for work and soon after you return home. Your partner may be able to make dinner while you feed the baby.  Breastfeed at night and on weekends. This will keep up your milk supply. Talk to your healthcare provider about the best ways to feed expressed breastmilk to your infant.    [employer]Well last reviewed this educational content on 6/1/2020 © 2000-2020 The [employer]. [address] [place] [address], [place] [address]. All rights reserved. This information is not intended as a substitute for professional medical care. Always follow your healthcare professional's instructions.        Storing Expressed Milk    You can express your milk and store it in clean containers. Your family or a sitter can feed it to the baby. This way, your baby gets the benefits of your milk even when you can't be there at feeding time.  Type of storage Storage times   Room temperature     At room temperature (up to 78°F or 26°C)  Tip: Keep the container clean, covered, and cool. 3 to 4 hours is best; 6 to 8 hours is acceptable under very clean conditions   Refrigerator     In a refrigerator (less than 39°F or less than 4°C)  Tip: Place milk in the back of the main section of the refrigerator. 72 hours is best; up to 8 days is acceptable under very clean conditions   Freezer      In a freezer (0°F or -17°C)  Tip: Store milk toward the back of the freezer. 6 months is best; 12 months is acceptable   Guidelines for milk storage  Always use a clean container to collect and store milk. Never pour warm expressed milk into a bottle with cold milk. And be sure to label and date each bottle or bag of milk. To store milk safely, see the chart above.  Warming stored milk  Thaw frozen milk in the refrigerator or in a bowl of warm water. It’s a good idea to warm refrigerated milk before using it. For your baby’s safety:  Use the oldest milk first.  Warm a container of milk by putting it in a bowl of warm (not hot)  water for a few minutes. Or use a bottle warmer set on low.  Gently swirl the milk to mix it. Then place a few drops on your wrist. The milk should be near room temperature.  Don’t put the milk in a microwave. This could create pockets of hot liquid that can burn your baby’s mouth.  [employer] last reviewed this educational content on 7/25/2018 © 2000-2020 The [employer], [employer]. [address] [place] [address], [place] [address]. All rights reserved. This information is not intended as a substitute for professional medical care. Always follow your healthcare professional's instructions.        Kegel Exercises  Kegel exercises are done to help strengthen the muscles in your pelvic floor. You don’t need special clothing or equipment. They’re easy to learn and simple to do. And if you do them right, no one can tell you’re doing them, so they can be done almost anywhere. Your healthcare provider, nurse, or physical therapist can answer any questions you have and help you get started.     A weak pelvic floor  The pelvic floor muscles may weaken due to aging, pregnancy and vaginal childbirth, injury, surgery, chronic cough, or lack of exercise. If the pelvic floor is weak, your bladder and other pelvic organs may sag out of place. The urethra may also open too easily and allow urine to leak out. Kegel exercises can help you strengthen your pelvic floor muscles. Then they can better support the pelvic organs and control urine flow.   How Kegel exercises are done  Try each of the Kegel exercises described below. When you’re doing them, try not to move your leg, buttock, or stomach muscles:   Contract as if you were stopping your urine stream. But do it when you’re not urinating.  Tighten your rectum as if trying not to pass gas. Contract your anus, but don’t move your buttocks.  You may place a finger or 2 in the vagina and squeeze your finger with your vagina to learn which muscles to tighten.  Try to hold each Kegel for a slow  count to 5. You probably won’t be able to hold them for that long at first. But keep practicing. It will get easier as your pelvic floor gets stronger. Eventually, special weights that you place in your vagina may be recommended to help make your Kegels even more effective. Talk to your healthcare provider if you have trouble doing Kegel exercises.   Helpful tips  Here are some tips to follow:  Do your Kegels as often as you can. The more you do them, the faster you’ll feel the results.  Pick an activity you do often as a reminder. For instance, do your Kegels every time you sit down.  Tighten your pelvic floor before you sneeze, get up from a chair, cough, laugh, or lift. This can help prevent urine, gas, or stool leakage.  [employer] last reviewed this educational content on 8/1/2020 © 2000-2020 [employer]. [address] [place] [address]. All rights reserved. This information is not intended as a substitute for professional medical care. Always follow your healthcare professional's instructions.   Birth Control Methods  Birth control methods are used to help prevent pregnancy. There are many different methods to choose from. Talk to your healthcare provider about which method is right for you. Be sure to ask your provider about the effectiveness of each method. Also ask about the benefits, risks, and side effects of each method.  Hormones  Some birth control methods work by releasing hormones such as progestin and estrogen. These methods include hormone implants, hormone shots, the vaginal ring, the patch, and birth control pills. They all work by stopping ovulation (release of the egg from the ovary). The implant is a small device that needs to be placed in the upper arm by a trained healthcare provider. It works for up to 3 years. Hormone injections must be repeated every 3 months. The vaginal ring must be replaced monthly (it can be removed during the fourth week of each cycle). The  patch must be replaced weekly (it is not worn during the fourth week of each cycle). Birth control pills must be taken every day. All of these methods are effective and can be stopped at any time.  Intrauterine device (IUD)  An IUD is a small, T-shaped device. It must be placed in the uterus by a trained healthcare provider. There are different types of IUDs available. They work by causing changes in the uterus that make it harder for sperm to reach the egg. Depending on the type of IUD you have, it may work for several years or longer. The IUD is a reversible birth control method. This means it can be removed at any time.  Condom  A condom is a sheath that forms a thin barrier between the penis and the vagina. It helps prevent pregnancy by keeping sperm from entering the vagina. When latex condoms are used, they have the added benefit of protecting against most STIs (sexually transmitted infections). Condoms are used each time there is sexual intercourse and should be discarded after each use. Ask your healthcare provider about the different types of condoms available. These include both the male condom and female condom.  Spermicide  Spermicides come as foams, jellies, creams, suppositories, and tablets.  They help prevent pregnancy by killing sperm. When used alone they are not that reliable. They work best when combined with other birth control methods such as diaphragms and cervical caps.  Sponge, diaphragm, and cervical cap  All of these methods help prevent pregnancy by covering the opening of the uterus (cervix). This prevents sperm from passing through.  The sponge contains spermicide. It can be bought over the counter. The sponge must be left in place for at least 6 hours after the last time you have sex. However, it should not stay in place for more than 24 hours. It should be discarded after it is used.  The diaphragm and cervical cap must be fitted and prescribed by your healthcare provider. Both are  used with spermicide. The diaphragm must be left in place for at least 6 hours after sex. However, it should not stay in place for more than 24 hours. It can be washed and reused. The cervical cap must be left in place for at least 6 hours after sex. However, it should not stay in place for more than 48 hours. It can be washed and reused.  Withdrawal method  This is when the man pulls his penis out of the vagina just before ejaculation (“coming”). This lowers the amount of sperm entering the vagina. Be aware that fluids released just before ejaculation often still contain some sperm, so this method is not as reliable as certain other methods.  Rhythm method  This method requires that you know when in your menstrual cycle you are likely to become pregnant. Then, you avoid sex during those days. This requires careful planning and good discipline. Your healthcare provider can explain more about how this works.  Tubal ligation and vasectomy  These are surgical methods to prevent pregnancy. Tubal ligation is an option for women. The fallopian tubes are blocked or cut (ligated). This keeps the egg from passing into the uterus or sperm from reaching the egg. Vasectomy is an option for men. The tubes that normally carry sperm to the penis are either closed or blocked. Both tubal ligation and vasectomy are permanent birth control methods. This means reversal is either not possible or unlikely to work. They are good choices for women and men who know that they do not want to have children in the future.  [employer] last reviewed this educational content on 11/1/2017  © 5544-7958 [employer]. [address] [place] [address], [place] [address]. All rights reserved. This information is not intended as a substitute for professional medical care. Always follow your healthcare professional's instructions.        How Birth Control Works  Birth control prevents pregnancy by preventing conception. Some methods prevent an egg from  maturing. Some keep the sperm and egg from meeting. And some methods work in both ways.   Preventing ovulation  Certain hormones help prevent an egg from maturing and being released. Hormone methods include:   Birth control pills  Skin patches  Contraceptive vaginal rings  Injections  Preventing sperm and egg from meeting  Methods that prevent the sperm and egg from joining include:  Barrier methods, such as the condom, the diaphragm, and the cervical cap  Spermicide  The IUD (intrauterine device)  Sterilization  Natural family planning  Some types of hormone methods  [employer] last reviewed this educational content on 4/1/2020 © 2000-2020 [employer]. [address] [place] [id number]. All rights reserved. This information is not intended as a substitute for professional medical care. Always follow your healthcare professional's instructions.        Birth Control: Time-Release Hormones     Time-release hormones require a doctor's prescription.   Certain hormones can help prevent pregnancy. Hormones like the ones used in birth control pills can be taken in other forms. These must be prescribed by your healthcare provider. Because there’s very little for you to do, you may find one of these methods easier to stick to than pills. Side effects for this method will vary depending on the type of time-release hormone you use. Talk to your healthcare provider for more information.   Pregnancy rates  Talk to your healthcare provider about the effectiveness of this birth control method.  Using time-release hormones  Methods to deliver hormones include:  A skin patch placed on your stomach, buttocks, arm, or shoulder. You replace the patch weekly.  A ring that you insert in your vagina, leave in for 3 weeks, and remove for 1 week.  Injections given in your arm or buttocks once every 3 months by your healthcare provider.  An implant placed under the skin in the upper arm by your healthcare provider. This  can be left in place for up to 3 years.  The progestin IUDs placed by your healthcare provider. These can be left in place for 3 to 5 years depending on which one is chosen.  Pros  Lowest pregnancy rate of the birth control methods that can be reversed  No interruption to sex  Easy to use  Don’t require taking a pill each day  May decrease menstrual cramps, menstrual flow, and acne    Cons  Don't protect against sexually transmitted infections (STIs)  May cause irregular periods  May cause side effects such as nausea, weight gain, headaches, breast tenderness, fatigue, or mood changes (these often go away within 3 months)  May take up to a year for you to become fertile (able to get pregnant) after stopping injections  May increase the risk of blood clots, heart attack, and stroke    Time-release hormones may not be for you  Time-release hormones may not be for you if:  You are a smoker and over age 35  You have high blood pressure, gallbladder disease, liver disease, certain lipid disorders, cerebrovascular disease (stroke), or heart disease  You have diabetes, migraines, clot in a vein or artery (thromboembolic disorder), lupus, or take medicines that may interfere with the hormones  In these cases, discuss the risks with your healthcare provider.  [employer] last reviewed this educational content on 4/1/2020  © 0411-0768 [employer]. [address] [place] [address], [place] [address]. All rights reserved. This information is not intended as a substitute for professional medical care. Always follow your healthcare professional's instructions.        Birth Control: Natural Family Planning     To use NFP, you keep daily records of the signs that show when you are fertile.   Natural family planning (NFP) is based on a woman's awareness of when she is likely to become pregnant (fertile). By learning how to tell when you're fertile, you can know when to not have sex. This can help prevent pregnancy. To learn NFP,  it's advised that you take a class or work with a qualified teacher.   Pregnancy rates  Talk to your healthcare provider about how well this birth control method works.   Using NFP  A woman is fertile only during a certain part of her monthly cycle--just before and during ovulation. By learning when you ovulate, you can know when you're likely to be fertile. You estimate when you're ovulating by observing and keeping track of certain physical signs. You can then avoid sex or use a barrier method during that fertile time. But be aware that each woman's cycle and signs are different, and no woman's cycle is perfectly regular.   Pros  Both partners share responsibility  No known health risks  No side effects  Is inexpensive or free  If you abstain from sex during fertile periods, this method is approved by all [employer] orthodox communities  Easy to stop if you decide you want to become pregnant    Cons  Takes time to learn  Requires daily observation and charting  Requires abstaining from sex or using a barrier method during fertile periods (nine or more days per month)  Does not protect against sexually transmitted infections (STIs)    When natural family planning may not be for you  Natural family planning may not be for you if:  You don't have the full cooperation of your partner  You haven't received training from a qualified teacher  Your periods are not regular  You take certain medicines or should not get pregnant due to a medical condition  You just started having periods or you are approaching menopause  [employer] last reviewed this educational content on 5/1/2020  © 1234-8998 [employer]. [address] [place] [address], [place] [address]. All rights reserved. This information is not intended as a substitute for professional medical care. Always follow your healthcare professional's instructions.        Birth Control: IUD (Intrauterine Device)    The IUD (intrauterine device) is small, flexible, and T-shaped. A  trained healthcare provider places it in the uterus. The IUD is one of the most effective birth control methods. It's also reversible. This means it can be removed at any time by a trained healthcare provider. New IUDs are safe and don't have the risks of older types of IUDs.   Pregnancy rates  Talk to your healthcare provider about the effectiveness of this birth control method.  Types of IUDs  IUD insertion is done in the healthcare provider’s office. Two types of IUDs are available:  The copper IUD releases a small amount of copper into the uterus. The copper makes it harder for sperm to reach the egg. The device works for at least 10 years.  The progestin IUD releases a hormone called progestin. It causes changes in the uterus to help prevent pregnancy. The device works for 3 to 5 years, depending on which device is chosen. It may be recommended for women who have anemia or heavy and painful periods.  IUDs have thin strings that hang from the opening of the uterus into the vagina. This lets you check that the IUD stays in place.   Things to know about IUDs  IUDs can be used by women who have never been pregnant or by women with a history of sexually transmitted infections (STIs) or tubal pregnancy.  It won't move from the uterus to any other part of the body.  There is a slight risk of the device coming out of the vagina (expulsion).  It may not work in women who have an abnormally shaped uterus.  A copper IUD may cause heavier periods and cramping.  Progestin IUD may cause light periods or no periods at all (irregular bleeding or spotting is possible and normal during first 3 to 6 months).  If you get a sexually transmitted infection with an IUD in place, symptoms may be more severe.    What to report to your healthcare provider  Be sure your healthcare provider knows if you have:  A sexually transmitted infection (STI) or possible STI  Liver problems  Blood clots (for progestin IUD only)  Breast cancer or a  history of breast cancer (progestin IUD only)  [employer] last reviewed this educational content on 5/1/2020 © 2000-2020 [employer]. [address] [place] [address], [place] [address]. All rights reserved. This information is not intended as a substitute for professional medical care. Always follow your healthcare professional's instructions.        Levonorgestrel intrauterine device (IUD)  Brand Names: Kyleena, LILETTA, Mirena, Ladonna  What is this medicine?  LEVONORGESTREL IUD (GHADA voe nor melina trel) is a contraceptive (birth control) device. The device is placed inside the uterus by a healthcare professional. It is used to prevent pregnancy. This device can also be used to treat heavy bleeding that occurs during your period.  How should I use this medicine?  This device is placed inside the uterus by a health care professional.  Talk to your pediatrician regarding the use of this medicine in children. Special care may be needed.  What side effects may I notice from receiving this medicine?  Side effects that you should report to your doctor or health care professional as soon as possible:  allergic reactions like skin rash, itching or hives, swelling of the face, lips, or tongue  fever, flu-like symptoms  genital sores  high blood pressure  no menstrual period for 6 weeks during use  pain, swelling, warmth in the leg  pelvic pain or tenderness  severe or sudden headache  signs of pregnancy  stomach cramping  sudden shortness of breath  trouble with balance, talking, or walking  unusual vaginal bleeding, discharge  yellowing of the eyes or skin  Side effects that usually do not require medical attention (report to your doctor or health care professional if they continue or are bothersome):  acne  breast pain  change in sex drive or performance  changes in weight  cramping, dizziness, or faintness while the device is being inserted  headache  irregular menstrual bleeding within first 3 to 6 months of  use  nausea  What may interact with this medicine?  Do not take this medicine with any of the following medications:  amprenavir  bosentan  fosamprenavir  This medicine may also interact with the following medications:  aprepitant  armodafinil  barbiturate medicines for inducing sleep or treating seizures  bexarotene  boceprevir  griseofulvin  medicines to treat seizures like carbamazepine, ethotoin, felbamate, oxcarbazepine, phenytoin, topiramate  modafinil  pioglitazone  rifabutin  rifampin  rifapentine  some medicines to treat HIV infection like atazanavir, efavirenz, indinavir, lopinavir, nelfinavir, tipranavir, ritonavir  [facility]'s wort  warfarin  What if I miss a dose?  This does not apply. Depending on the brand of device you have inserted, the device will need to be replaced every 3 to 6 years if you wish to continue using this type of birth control.  Where should I keep my medicine?  This does not apply.  What should I tell my health care provider before I take this medicine?  They need to know if you have any of these conditions:  abnormal Pap smear  cancer of the breast, uterus, or cervix  diabetes  endometritis  genital or pelvic infection now or in the past  have more than one sexual partner or your partner has more than one partner  heart disease  history of an ectopic or tubal pregnancy  immune system problems  IUD in place  liver disease or tumor  problems with blood clots or take blood-thinners  seizures  use intravenous drugs  uterus of unusual shape  vaginal bleeding that has not been explained  an unusual or allergic reaction to levonorgestrel, other hormones, silicone, or polyethylene, medicines, foods, dyes, or preservatives  pregnant or trying to get pregnant  breast-feeding  What should I watch for while using this medicine?  Visit your doctor or health care professional for regular check ups. See your doctor if you or your partner has sexual contact with others, becomes HIV positive, or  gets a sexual transmitted disease.  This product does not protect you against HIV infection (AIDS) or other sexually transmitted diseases.  You can check the placement of the IUD yourself by reaching up to the top of your vagina with clean fingers to feel the threads. Do not pull on the threads. It is a good habit to check placement after each menstrual period. Call your doctor right away if you feel more of the IUD than just the threads or if you cannot feel the threads at all.  The IUD may come out by itself. You may become pregnant if the device comes out. If you notice that the IUD has come out use a backup birth control method like condoms and call your health care provider.  Using tampons will not change the position of the IUD and are okay to use during your period.  This IUD can be safely scanned with magnetic resonance imaging (MRI) only under specific conditions. Before you have an MRI, tell your healthcare provider that you have an IUD in place, and which type of IUD you have in place.  NOTE:This sheet is a summary. It may not cover all possible information. If you have questions about this medicine, talk to your doctor, pharmacist, or health care provider. Copyright© 2020 [employer]        Etonogestrel implant  What is this medicine?  ETONOGESTREL (et oh guevara LAWRENCE trel) is a contraceptive (birth control) device. It is used to prevent pregnancy. It can be used for up to 3 years.  How should I use this medicine?  This device is inserted just under the skin on the inner side of your upper arm by a health care professional.  Talk to your pediatrician regarding the use of this medicine in children. Special care may be needed.  What side effects may I notice from receiving this medicine?  Side effects that you should report to your doctor or health care professional as soon as possible:  allergic reactions like skin rash, itching or hives, swelling of the face, lips, or tongue  breast lumps  changes in emotions or  moods  depressed mood  heavy or prolonged menstrual bleeding  pain, irritation, swelling, or bruising at the insertion site  scar at site of insertion  signs of infection at the insertion site such as fever, and skin redness, pain or discharge  signs of pregnancy  signs and symptoms of a blood clot such as breathing problems; changes in vision; chest pain; severe, sudden headache; pain, swelling, warmth in the leg; trouble speaking; sudden numbness or weakness of the face, arm or leg  signs and symptoms of liver injury like dark yellow or brown urine; general ill feeling or flu-like symptoms; light-colored stools; loss of appetite; nausea; right upper belly pain; unusually weak or tired; yellowing of the eyes or skin  unusual vaginal bleeding, discharge  signs and symptoms of a stroke like changes in vision; confusion; trouble speaking or understanding; severe headaches; sudden numbness or weakness of the face, arm or leg; trouble walking; dizziness; loss of balance or coordination  Side effects that usually do not require medical attention (report to your doctor or health care professional if they continue or are bothersome):  acne  back pain  breast pain  changes in weight  dizziness  general ill feeling or flu-like symptoms  headache  irregular menstrual bleeding  nausea  sore throat  vaginal irritation or inflammation  What may interact with this medicine?  Do not take this medicine with any of the following medications:  amprenavir  fosamprenavir  This medicine may also interact with the following medications:  acitretin  aprepitant  armodafinil  bexarotene  bosentan  carbamazepine  certain medicines for fungal infections like fluconazole, ketoconazole, itraconazole and voriconazole  certain medicines to treat hepatitis, HIV or AIDS  cyclosporine  felbamate  griseofulvin  lamotrigine  modafinil  oxcarbazepine  phenobarbital  phenytoin  primidone  rifabutin  rifampin  rifapentine  [facility]  wort  topiramate  What if I miss a dose?  This does not apply.  Where should I keep my medicine?  This drug is given in a hospital or clinic and will not be stored at home.  What should I tell my health care provider before I take this medicine?  They need to know if you have any of these conditions:  abnormal vaginal bleeding  blood vessel disease or blood clots  breast, cervical, endometrial, ovarian, liver, or uterine cancer  diabetes  gallbladder disease  heart disease or recent heart attack  high blood pressure  high cholesterol or triglycerides  kidney disease  liver disease  migraine headaches  seizures  stroke  tobacco smoker  an unusual or allergic reaction to etonogestrel, anesthetics or antiseptics, other medicines, foods, dyes, or preservatives  pregnant or trying to get pregnant  breast-feeding  What should I watch for while using this medicine?  This product does not protect you against HIV infection (AIDS) or other sexually transmitted diseases.  You should be able to feel the implant by pressing your fingertips over the skin where it was inserted. Contact your doctor if you cannot feel the implant, and use a non-hormonal birth control method (such as condoms) until your doctor confirms that the implant is in place. Contact your doctor if you think that the implant may have broken or become bent while in your arm.  You will receive a user card from your health care provider after the implant is inserted. The card is a record of the location of the implant in your upper arm and when it should be removed. Keep this card with your health records.  NOTE:This sheet is a summary. It may not cover all possible information. If you have questions about this medicine, talk to your doctor, pharmacist, or health care provider. Copyright© 2020 [employer]

## 2024-08-21 NOTE — PROGRESS NOTES
Chief Complaint   Patient presents with    Postpartum Care     6 weeks pp. Pt is still spotting. Pt will be using condoms for birth control. Grad score 7 - 3         HPI:   Christy is 29 year old , here today for 6-week postpartum visit.  Type and date of Delivery: , Complications: postpartum hemorrhage  See Delivery Summary for baby's gender and weight  Perineum: no concerns  Feeding Method: breast, no concerns  Bonding: well  Maternal sleep: good sleep for 6 weeks pp  Sexual activity resumed: no . Contraceptive Method: condoms as bridge to vasectomy  Postpartum Depression screen: denies now, 2-3 weeks ago had moodiness    Pt offered for MA to be present during exam and patient declined.    HISTORY:  Patient Active Problem List   Diagnosis    Hypothyroidism    Vitamin D deficiency    Menorrhagia with regular cycle    Ovarian cyst    Vaginal relaxation     (spontaneous vaginal delivery) (HCC)    Third-stage postpartum hemorrhage (HCC)    Postpartum anemia (HCC)       Medications (Active prior to today's visit):  Current Outpatient Medications   Medication Sig Dispense Refill    ERGOCALCIFEROL 1.25 MG (50693 UT) Oral Cap TAKE 1 CAPSULE BY MOUTH 1 TIME A WEEK 12 capsule 0    MONTELUKAST 10 MG Oral Tab TAKE 1 TABLET(10 MG) BY MOUTH DAILY 90 tablet 0    levothyroxine 50 MCG Oral Tab Take 1 tablet (50 mcg total) by mouth before breakfast. 90 tablet 0    prenatal vitamin w/DHA 27-0.8-228 MG Oral Cap Take 1 capsule by mouth daily.      Norethindrone (ORTHO MICRONOR) 0.35 MG Oral Tab Take 1 tablet (0.35 mg total) by mouth daily. (Patient not taking: Reported on 2024) 28 tablet 11    cetirizine 10 MG Oral Tab Take 1 tablet (10 mg total) by mouth daily. (Patient not taking: Reported on 2024)      Ferrous Sulfate 325 (65 Fe) MG Oral Tab Take 1 tablet (325 mg total) by mouth every other day. (Patient not taking: Reported on 2024) 30 tablet 3    albuterol 108 (90 Base) MCG/ACT Inhalation Aero Soln  Inhale 2 puffs into the lungs every 6 (six) hours as needed for Wheezing or Shortness of Breath. (Patient not taking: Reported on 8/21/2024) 18 g 0       Allergies:   Allergies   Allergen Reactions    Shellfish DIARRHEA, NAUSEA AND VOMITING, PALPITATIONS, DIZZINESS, SHORTNESS OF BREATH and ANXIETY     Oyster sauce    Lactose DIARRHEA    Seasonal ITCHING       PHYSICAL EXAM:   Vitals:    08/21/24 1331   BP: 105/70   Pulse: 67       Pre-pregnancy 23.13  Today's Body mass index is 23.76 kg/m².    Pre-pregnancy weight 150 lbs  Today's weight 154 lbs    Physical Exam  Vitals reviewed.   Constitutional:       Appearance: Normal appearance.   HENT:      Head: Normocephalic.   Pulmonary:      Effort: Pulmonary effort is normal.   Abdominal:      Comments: Rectus abdominus muscles 1 finger-breath diastasis  Uterus palpates within the pelvis by abdominal exam     Genitourinary:     Comments: Well-healed  Good tone with Kegel's    Neurological:      Mental Status: She is alert and oriented to person, place, and time.   Psychiatric:         Behavior: Behavior normal.         Thought Content: Thought content normal.         Judgment: Judgment normal.            ASSESSMENT/PLAN:   Christy was seen today for postpartum care.    Diagnoses and all orders for this visit:    Postpartum care and examination (HCC)    Postpartum anemia (HCC)  -     CBC, Platelet; No Differential         Stop Taking                pyridoxine 50 MG Oral Tab    Take 1 tablet (50 mg total) by mouth daily.           6 weeks postpartum, stable, breastfeeding, plans condoms    Next annual due: 3 months  Follow-up/Return to clinic: PRN or 3 months    Counseling:   Postpartum teaching including maternal and family adaptation, sleep/rest strategies, lactation and weaning (if applicable), reaching ideal body weight, sexuality/contraception, importance of Kegel's and abdominal exercises, continuation of prenatal vitamins, and signs/symptoms of postpartum  depression  Patient verbalized understanding, All questions answered. No barriers to learning identified

## 2024-08-22 LAB
HEMATOCRIT: 37.8 % (ref 35–45)
HEMOGLOBIN: 12.3 G/DL (ref 11.7–15.5)
MCH: 29.4 PG (ref 27–33)
MCHC: 32.5 G/DL (ref 32–36)
MCV: 90.4 FL (ref 80–100)
MPV: 9.7 FL (ref 7.5–12.5)
PLATELET COUNT: 290 THOUSAND/UL (ref 140–400)
RDW: 11.8 % (ref 11–15)
RED BLOOD CELL COUNT: 4.18 MILLION/UL (ref 3.8–5.1)
WHITE BLOOD CELL COUNT: 6 THOUSAND/UL (ref 3.8–10.8)

## 2024-09-28 RX ORDER — MONTELUKAST SODIUM 10 MG/1
10 TABLET ORAL DAILY
Qty: 90 TABLET | Refills: 0 | Status: SHIPPED | OUTPATIENT
Start: 2024-09-28

## 2024-09-28 NOTE — TELEPHONE ENCOUNTER
MEDICATION REFILL REQUEST:    Future Appointment: NO FUTURE APPT     Last appointment: 2/12/2024    Medication requested:   Requested Prescriptions     Pending Prescriptions Disp Refills    ERGOCALCIFEROL 1.25 MG (78582 UT) Oral Cap [Pharmacy Med Name: VITAMIN D2 50,000IU (ERGO) CAP RX] 12 capsule 0     Sig: TAKE 1 CAPSULE BY MOUTH 1 TIME A WEEK     Last refill date:   ERGOCALCIFEROL 1.25 MG (66500 UT) Oral Cap 12 capsule 0 7/5/2024 --    Sig - Route: TAKE 1 CAPSULE BY MOUTH 1 TIME A WEEK - Oral    Sent to pharmacy as: Vitamin D (Ergocalciferol) 1.25 MG (75333 UT) Oral Capsule (Vitamin D2)    E-Prescribing Status: Receipt confirmed by pharmacy (7/5/2024  8:09 AM CDT)        Last Vitamin D: 2023      normal...

## 2024-10-01 RX ORDER — ERGOCALCIFEROL 1.25 MG/1
50000 CAPSULE, LIQUID FILLED ORAL WEEKLY
Qty: 12 CAPSULE | Refills: 0 | Status: SHIPPED | OUTPATIENT
Start: 2024-10-01

## 2024-11-19 ENCOUNTER — E-VISIT (OUTPATIENT)
Dept: TELEHEALTH | Age: 29
End: 2024-11-19
Payer: COMMERCIAL

## 2024-11-19 DIAGNOSIS — R22.0 LIP SWELLING: ICD-10-CM

## 2024-11-19 DIAGNOSIS — R21 FACIAL RASH: Primary | ICD-10-CM

## 2024-11-19 PROCEDURE — 99421 OL DIG E/M SVC 5-10 MIN: CPT | Performed by: PHYSICIAN ASSISTANT

## 2024-11-19 NOTE — PROGRESS NOTES
Christy Cespedes is a 29 year old female who initiated e-visit care today.    HPI:   See answers to questionnaire submission     Current Outpatient Medications   Medication Sig Dispense Refill    ERGOCALCIFEROL 1.25 MG (34007 UT) Oral Cap TAKE 1 CAPSULE BY MOUTH 1 TIME A WEEK 12 capsule 0    MONTELUKAST 10 MG Oral Tab TAKE 1 TABLET(10 MG) BY MOUTH DAILY 90 tablet 0    Norethindrone (ORTHO MICRONOR) 0.35 MG Oral Tab Take 1 tablet (0.35 mg total) by mouth daily. (Patient not taking: Reported on 8/21/2024) 28 tablet 11    cetirizine 10 MG Oral Tab Take 1 tablet (10 mg total) by mouth daily. (Patient not taking: Reported on 8/21/2024)      Ferrous Sulfate 325 (65 Fe) MG Oral Tab Take 1 tablet (325 mg total) by mouth every other day. (Patient not taking: Reported on 8/21/2024) 30 tablet 3    levothyroxine 50 MCG Oral Tab Take 1 tablet (50 mcg total) by mouth before breakfast. 90 tablet 0    albuterol 108 (90 Base) MCG/ACT Inhalation Aero Soln Inhale 2 puffs into the lungs every 6 (six) hours as needed for Wheezing or Shortness of Breath. (Patient not taking: Reported on 8/21/2024) 18 g 0    prenatal vitamin w/DHA 27-0.8-228 MG Oral Cap Take 1 capsule by mouth daily.        Past Medical History:    Anemia    Anxiety    Depression    Hair loss    History of postpartum hemorrhage    Hypothyroidism      Past Surgical History:   Procedure Laterality Date    Tonsillectomy        Family History   Problem Relation Age of Onset    Lipids Father     Ovarian Cancer Mother 42        BRCA neg    Other (Other) Mother         asthma     Prostate Cancer Maternal Grandfather     Breast Cancer Paternal Grandmother       Social History:  Social History     Socioeconomic History    Marital status:    Tobacco Use    Smoking status: Never    Smokeless tobacco: Never   Substance and Sexual Activity    Alcohol use: Not Currently     Comment: occ prior to pregnancy     Drug use: No    Sexual activity: Yes     Comment: NONE   Other  Topics Concern    Caffeine Concern Yes     Comment: coffee    Breast feeding No    Reaction to local anesthetic No    Pt has a pacemaker No    Pt has a defibrillator No     Social Drivers of Health     Financial Resource Strain: Low Risk  (7/7/2024)    Financial Resource Strain     Difficulty of Paying Living Expenses: Not hard at all     Med Affordability: No   Food Insecurity: No Food Insecurity (7/7/2024)    Food Insecurity     Food Insecurity: Never true   Transportation Needs: No Transportation Needs (7/7/2024)    Transportation Needs     Lack of Transportation: No     Car Seat: Yes   Stress: No Stress Concern Present (7/7/2024)    Stress     Feeling of Stress : No   Housing Stability: Low Risk  (7/7/2024)    Housing Stability     Housing Instability: No     Crib or Bassinette: Yes         ASSESSMENT AND PLAN:       Diagnoses and all orders for this visit:    Facial rash    Lip swelling    Advised in-person evaluation at [facility] or [facility]. If severe sx advised ED.        Duration of  the service:  5 minutes      See [employer] message exchange and Patient Instructions for Comfort Care and patient education.

## 2024-12-27 RX ORDER — LEVOTHYROXINE SODIUM 50 UG/1
TABLET ORAL
Qty: 105 TABLET | Refills: 0 | Status: SHIPPED | OUTPATIENT
Start: 2024-12-27

## 2024-12-27 RX ORDER — MONTELUKAST SODIUM 10 MG/1
10 TABLET ORAL DAILY
Qty: 90 TABLET | Refills: 0 | Status: SHIPPED | OUTPATIENT
Start: 2024-12-27

## 2025-01-03 RX ORDER — ERGOCALCIFEROL 1.25 MG/1
50000 CAPSULE, LIQUID FILLED ORAL WEEKLY
Qty: 12 CAPSULE | Refills: 0 | Status: SHIPPED | OUTPATIENT
Start: 2025-01-03

## 2025-02-25 RX ORDER — LEVOTHYROXINE SODIUM 50 UG/1
TABLET ORAL
Qty: 105 TABLET | Refills: 0 | Status: SHIPPED | OUTPATIENT
Start: 2025-02-25

## 2025-04-01 RX ORDER — MONTELUKAST SODIUM 10 MG/1
10 TABLET ORAL DAILY
Qty: 90 TABLET | Refills: 0 | Status: SHIPPED | OUTPATIENT
Start: 2025-04-01

## 2025-04-05 RX ORDER — ERGOCALCIFEROL 1.25 MG/1
50000 CAPSULE, LIQUID FILLED ORAL WEEKLY
Qty: 12 CAPSULE | Refills: 0 | Status: SHIPPED | OUTPATIENT
Start: 2025-04-05

## 2025-04-05 NOTE — TELEPHONE ENCOUNTER
Future Appt. 05/27/2025    Last Visit: 02/14/2024    Medication Requested:  Requested Prescriptions     Pending Prescriptions Disp Refills    ERGOCALCIFEROL 1.25 MG (22287 UT) Oral Cap [Pharmacy Med Name: VITAMIN D2 50,000IU (ERGO) CAP RX] 12 capsule 0     Sig: TAKE 1 CAPSULE BY MOUTH 1 TIME A WEEK        Last refill:      Disp Refills Start End     ERGOCALCIFEROL 1.25 MG (80265 UT) Oral Cap 12 capsule 0 1/3/2025 --    Sig - Route: TAKE 1 CAPSULE BY MOUTH 1 TIME A WEEK - Oral

## 2025-04-22 ENCOUNTER — OFFICE VISIT (OUTPATIENT)
Dept: INTERNAL MEDICINE CLINIC | Facility: CLINIC | Age: 30
End: 2025-04-22
Payer: COMMERCIAL

## 2025-04-22 VITALS
OXYGEN SATURATION: 98 % | DIASTOLIC BLOOD PRESSURE: 60 MMHG | HEART RATE: 71 BPM | WEIGHT: 141.81 LBS | BODY MASS INDEX: 22 KG/M2 | SYSTOLIC BLOOD PRESSURE: 100 MMHG

## 2025-04-22 DIAGNOSIS — Z00.00 ANNUAL PHYSICAL EXAM: Primary | ICD-10-CM

## 2025-04-22 DIAGNOSIS — Z80.41 FAMILY HISTORY OF OVARIAN CANCER: ICD-10-CM

## 2025-04-22 DIAGNOSIS — E03.9 HYPOTHYROIDISM, UNSPECIFIED TYPE: ICD-10-CM

## 2025-04-22 DIAGNOSIS — Z12.4 SCREENING FOR CERVICAL CANCER: ICD-10-CM

## 2025-04-22 DIAGNOSIS — R79.89 ELEVATED TESTOSTERONE LEVEL IN FEMALE: ICD-10-CM

## 2025-04-22 PROCEDURE — 99395 PREV VISIT EST AGE 18-39: CPT | Performed by: INTERNAL MEDICINE

## 2025-04-22 NOTE — PROGRESS NOTES
Christy Cespedes is a 30 year old female.    Chief complaint: annual physical exam       HPI:     Christy Cespedes is a 30 year old female who presents for annual physical exam   No chest pain no sob no abdominal pain  No diarrhea or constipation   No fever or chills   No urinary complaints      She is still breast feeding   Had 1period   Used to have heavy periods before but not since delivery       No smoking   Never been a smoker   Alcohol :no   Exercise : active   Family history of cancer  : mom had ovarian cancer   Grandmother :  breast cancer   Grandfather : skin cancer   GF : prostate cancer         Current Medications[1]   Past Medical History[2]  Past Surgical History[3]        Family History[4]  Problem List[5]    REVIEW OF SYSTEMS:   A comprehensive 10 point review of systems was completed.  Pertinent positives and negatives noted in the the HPI            EXAM:   /60   Pulse 71   Wt 141 lb 12.8 oz (64.3 kg)   LMP 04/12/2025 (Exact Date)   SpO2 98%   Breastfeeding Yes   BMI 21.88 kg/m²   GENERAL: well developed, well nourished,in no apparent distress  SKIN: no rashes,no suspicious lesions  HEENT: atraumatic, normocephalic,ears and throat are clear  NECK: supple,no adenopathy  LUNGS: clear to auscultation  Breast : she prefers to defer since she is breast feeding   CARDIO: RRR without murmur  GI: no masses, HSM or tenderness  EXTREMITIES: no cyanosis, clubbing or edema  Pelvic : normal   Pap done   Normal bimanual exam   NEURO: no gross deficits              No orders of the defined types were placed in this encounter.    No results found.         ASSESSMENT AND PLAN:       ICD-10-CM    1. Annual physical exam  Z00.00 Genetic Counselor Referral - [place] (Georgiana Arrieta)     CBC With Differential With Platelet     Comp Metabolic Panel (14)     Lipid Panel     TSH W Reflex To Free T4     Ferritin     THINPREP TIS PAP REFLEX HPV MRNA E6/E7     Testosterone,Total and Weakly Bound w/  SHBG      2. Family history of ovarian cancer  Z80.41 Genetic Counselor Referral - [place] (Georgiana Arrieta)     CBC With Differential With Platelet     Comp Metabolic Panel (14)     Lipid Panel     TSH W Reflex To Free T4     Ferritin     THINPREP TIS PAP REFLEX HPV MRNA E6/E7     Testosterone,Total and Weakly Bound w/ SHBG      3. Hypothyroidism, unspecified type  E03.9 Genetic Counselor Referral - [place] (Georgiana Arreita)     CBC With Differential With Platelet     Comp Metabolic Panel (14)     Lipid Panel     TSH W Reflex To Free T4     Ferritin     THINPREP TIS PAP REFLEX HPV MRNA E6/E7     Testosterone,Total and Weakly Bound w/ SHBG      4. Elevated testosterone level in female  R79.89 Genetic Counselor Referral - [place] (Georgiana Arrieta)     CBC With Differential With Platelet     Comp Metabolic Panel (14)     Lipid Panel     TSH W Reflex To Free T4     Ferritin     THINPREP TIS PAP REFLEX HPV MRNA E6/E7     Testosterone,Total and Weakly Bound w/ SHBG      5. Screening for cervical cancer  Z12.4        Diet and exercise   Self breast exam   Sun screen recommended   Fasting blood work   Pap smear today   Advised to get genetic testing since mom with ovarian cancer   If she doesn't get a period after the 1st year or after stopping breast feeding to have more evaluation       Please return to the clinic if you are having persistent symptoms. If worsening symptoms should go to the ER    David Patel MD,   Diplomate of the American Board of Internal Medicine  Diplomate of the American Board of Obesity Medicine          [1]   Current Outpatient Medications   Medication Sig Dispense Refill    ERGOCALCIFEROL 1.25 MG (62595 UT) Oral Cap TAKE 1 CAPSULE BY MOUTH 1 TIME A WEEK 12 capsule 0    MONTELUKAST 10 MG Oral Tab TAKE 1 TABLET(10 MG) BY MOUTH DAILY 90 tablet 0    LEVOTHYROXINE 50 MCG Oral Tab TAKE 1 TABLET BY MOUTH DAILY ON MONDAY THROUGH FRIDAY, AND 2 TABLETS ON SATURDAY AND SUNDAY 105 tablet 0     albuterol 108 (90 Base) MCG/ACT Inhalation Aero Soln Inhale 2 puffs into the lungs every 6 (six) hours as needed for Wheezing or Shortness of Breath. 18 g 0    prenatal vitamin w/DHA 27-0.8-228 MG Oral Cap Take 1 capsule by mouth daily.      cetirizine 10 MG Oral Tab Take 1 tablet (10 mg total) by mouth daily. (Patient not taking: Reported on 2025)     [2]   Past Medical History:   Anemia    Anxiety    Depression    Hair loss    History of postpartum hemorrhage    Hypothyroidism   [3]   Past Surgical History:  Procedure Laterality Date    Tonsillectomy     [4]   Family History  Problem Relation Age of Onset    Lipids Father     Ovarian Cancer Mother 42        BRCA neg    Other (Other) Mother         asthma     Prostate Cancer Maternal Grandfather     Breast Cancer Paternal Grandmother    [5]   Patient Active Problem List  Diagnosis    Hypothyroidism    Vitamin D deficiency    Menorrhagia with regular cycle    Ovarian cyst    Vaginal relaxation     (spontaneous vaginal delivery) (HCC)    Third-stage postpartum hemorrhage (HCC)    Postpartum anemia (HCC)

## 2025-04-30 RX ORDER — LEVOTHYROXINE SODIUM 50 UG/1
TABLET ORAL
Qty: 105 TABLET | Refills: 0 | Status: SHIPPED | OUTPATIENT
Start: 2025-04-30

## 2025-05-03 LAB
ABSOLUTE BASOPHILS: 41 CELLS/UL (ref 0–200)
ABSOLUTE EOSINOPHILS: 180 CELLS/UL (ref 15–500)
ABSOLUTE LYMPHOCYTES: 1611 CELLS/UL (ref 850–3900)
ABSOLUTE MONOCYTES: 262 CELLS/UL (ref 200–950)
ABSOLUTE NEUTROPHILS: 2005 CELLS/UL (ref 1500–7800)
ALBUMIN/GLOBULIN RATIO: 2 (CALC) (ref 1–2.5)
ALBUMIN: 4.6 G/DL (ref 3.6–5.1)
ALKALINE PHOSPHATASE: 52 U/L (ref 31–125)
ALT: 15 U/L (ref 6–29)
AST: 15 U/L (ref 10–30)
BASOPHILS: 1 %
BILIRUBIN, TOTAL: 0.4 MG/DL (ref 0.2–1.2)
BUN: 15 MG/DL (ref 7–25)
CALCIUM: 9.6 MG/DL (ref 8.6–10.2)
CARBON DIOXIDE: 26 MMOL/L (ref 20–32)
CHLORIDE: 106 MMOL/L (ref 98–110)
CHOL/HDLC RATIO: 3.3 (CALC)
CHOLESTEROL, TOTAL: 237 MG/DL
CREATININE: 0.69 MG/DL (ref 0.5–0.97)
EGFR: 120 ML/MIN/1.73M2
EOSINOPHILS: 4.4 %
FERRITIN: 20 NG/ML (ref 16–154)
FREE TESTOSTERONE: 4 PG/ML (ref 0.1–6.4)
GLOBULIN: 2.3 G/DL (CALC) (ref 1.9–3.7)
GLUCOSE: 79 MG/DL (ref 65–99)
HDL CHOLESTEROL: 72 MG/DL
HEMATOCRIT: 38.7 % (ref 35–45)
HEMOGLOBIN: 12.3 G/DL (ref 11.7–15.5)
LDL-CHOLESTEROL: 149 MG/DL (CALC)
LYMPHOCYTES: 39.3 %
MCH: 28.1 PG (ref 27–33)
MCHC: 31.8 G/DL (ref 32–36)
MCV: 88.6 FL (ref 80–100)
MONOCYTES: 6.4 %
MPV: 10 FL (ref 7.5–12.5)
NEUTROPHILS: 48.9 %
NON-HDL CHOLESTEROL: 165 MG/DL (CALC)
PLATELET COUNT: 260 THOUSAND/UL (ref 140–400)
POTASSIUM: 4.2 MMOL/L (ref 3.5–5.3)
PROTEIN, TOTAL: 6.9 G/DL (ref 6.1–8.1)
RDW: 12.1 % (ref 11–15)
RED BLOOD CELL COUNT: 4.37 MILLION/UL (ref 3.8–5.1)
SODIUM: 141 MMOL/L (ref 135–146)
TESTOSTERONE, TOTAL,$/MS/MS: 38 NG/DL (ref 2–45)
TRIGLYCERIDES: 66 MG/DL
TSH W/REFLEX TO FT4: 1.91 MIU/L
WHITE BLOOD CELL COUNT: 4.1 THOUSAND/UL (ref 3.8–10.8)

## 2025-05-07 RX ORDER — METHYLDOPA 500 MG
160 TABLET ORAL DAILY
Qty: 90 TABLET | Refills: 1 | Status: SHIPPED | OUTPATIENT
Start: 2025-05-07 | End: 2025-08-05

## 2025-05-22 ENCOUNTER — TELEPHONE (OUTPATIENT)
Dept: OBGYN CLINIC | Facility: CLINIC | Age: 30
End: 2025-05-22

## 2025-05-22 ENCOUNTER — OFFICE VISIT (OUTPATIENT)
Dept: OBGYN CLINIC | Facility: CLINIC | Age: 30
End: 2025-05-22

## 2025-05-22 VITALS
DIASTOLIC BLOOD PRESSURE: 73 MMHG | WEIGHT: 144 LBS | SYSTOLIC BLOOD PRESSURE: 110 MMHG | TEMPERATURE: 98 F | BODY MASS INDEX: 22 KG/M2 | HEART RATE: 97 BPM

## 2025-05-22 PROBLEM — N92.0 MENORRHAGIA WITH REGULAR CYCLE: Status: RESOLVED | Noted: 2023-09-11 | Resolved: 2025-05-22

## 2025-05-22 PROBLEM — N83.209 OVARIAN CYST: Status: RESOLVED | Noted: 2023-11-28 | Resolved: 2025-05-22

## 2025-05-22 PROBLEM — N81.89 VAGINAL RELAXATION: Status: RESOLVED | Noted: 2024-02-02 | Resolved: 2025-05-22

## 2025-05-22 PROCEDURE — 99214 OFFICE O/P EST MOD 30 MIN: CPT | Performed by: ADVANCED PRACTICE MIDWIFE

## 2025-05-22 RX ORDER — DICLOXACILLIN SODIUM 500 MG/1
500 CAPSULE ORAL 4 TIMES DAILY
Qty: 40 CAPSULE | Refills: 0 | Status: SHIPPED | OUTPATIENT
Start: 2025-05-22 | End: 2025-06-01

## 2025-05-22 RX ORDER — IBUPROFEN 600 MG/1
600 TABLET, FILM COATED ORAL EVERY 6 HOURS PRN
Qty: 60 TABLET | Refills: 0 | Status: SHIPPED | OUTPATIENT
Start: 2025-05-22

## 2025-05-22 NOTE — TELEPHONE ENCOUNTER
Pt verified name and .     Pt developed breast engorgement of left breast yesterday morning. Pt states there is warmth and redness to left breast in additional to swelling. Pt states she had a fever yesterday, but has been taking ibuprofen which has helped reduce the fever but she is still experiencing chills. Advised that pt be seen in the office for further assessment of symptoms. Pt verbalized understanding and agreed. Pt scheduled today at 2:45 pm. Pt aware of scheduling details.

## 2025-05-22 NOTE — PROGRESS NOTES
Chief Complaint:   Chief Complaint   Patient presents with    Gyn Exam     Mastitis sx - swelling and redness of left breast, fever/chills  Taking ibuprofen for fever        Pt offered for MA to be present during exam and patient declines      HPI:   Christy is 30 year old female, here today for left breast pain and tenderness + fever last night  Breastfeeding 9 month old  She notes localized breast erythema, warmth, swelling, and pain. Fever present  last evening  Lactation history includes currently breastfeeding  Denies excessive vaginal bleeding or cramping, no clots or foul-smelling discharge  No urinary frequency, urgency, or burning.    HISTORY:  Past Medical History[1]   Past Surgical History[2]   Family History[3]   Social History: Short Social Hx on File[4]     Medications (Active prior to today's visit):  Current Medications[5]    Allergies:  Allergies[6]    ROS:   Review of Systems   Constitutional:  Positive for fatigue and fever. Negative for activity change, appetite change, chills, diaphoresis and unexpected weight change.   Respiratory: Negative.     Cardiovascular: Negative.    Psychiatric/Behavioral: Negative.         PHYSICAL EXAM:     Vitals:    05/22/25 1443   BP: 110/73   Pulse: 97   Temp: 98.2 °F (36.8 °C)     Physical Exam  Vitals reviewed.   Constitutional:       General: She is not in acute distress.     Appearance: Normal appearance. She is normal weight. She is not toxic-appearing.   Cardiovascular:      Rate and Rhythm: Normal rate.   Pulmonary:      Effort: Pulmonary effort is normal.   Chest:   Breasts:     Right: Skin change present. No swelling, bleeding or inverted nipple.      Left: No swelling, bleeding, inverted nipple, mass, nipple discharge or skin change.       Skin:     General: Skin is dry.   Neurological:      Mental Status: She is alert and oriented to person, place, and time.         ASSESSMENT/PLAN:     Christy was seen today for gyn exam.    Diagnoses and all orders  for this visit:    Mastitis during puerperium (HCC)  -     dicloxacillin 500 MG Oral Cap; Take 1 capsule (500 mg total) by mouth 4 (four) times daily for 10 days.  -     Lecithin 1000 MG Oral Chew Tab; Chew 5,000 mg by mouth daily.  -     ibuprofen 600 MG Oral Tab; Take 1 tablet (600 mg total) by mouth every 6 (six) hours as needed for Pain.         Counseling included:  -- Importance of taking entire course of antibiotics as prescribed  -- OTC pain relievers such as Tylenol or ibuprofen are safe while breastfeeding  -- Safety and benefits of continued breastfeeding  -- Avoidance of overfilling/fully draining breasts, importance of proper latching and varied positions, massage of/warm compresses on affected area, and feeding on affected side first    Pt voiced understanding and agreed with plan. All questions answered. No barriers to learning identified.    Total time spent 30 minutes this calendar day which includes preparing to see the patient including chart review, obtaining and/or reviewing additional medical history, performing a physical exam and evaluation, documenting clinical information in the electronic medical record, independently interpreting results, counseling the patient, communicating results to the patient/family/caregiver and coordinating care.                [1]   Past Medical History:   Anemia    Anxiety    Depression    Hair loss    History of postpartum hemorrhage    Hypothyroidism   [2]   Past Surgical History:  Procedure Laterality Date    Tonsillectomy     [3]   Family History  Problem Relation Age of Onset    Lipids Father     Ovarian Cancer Mother 42        BRCA neg    Other (Other) Mother         asthma     Prostate Cancer Maternal Grandfather     Breast Cancer Paternal Grandmother    [4]   Social History  Socioeconomic History    Marital status:    Tobacco Use    Smoking status: Never    Smokeless tobacco: Never   Vaping Use    Vaping status: Never Used   Substance and Sexual  Activity    Alcohol use: Not Currently     Comment: occ prior to pregnancy     Drug use: No    Sexual activity: Yes     Comment: NONE   Other Topics Concern    Caffeine Concern Yes     Comment: coffee    Breast feeding No    Reaction to local anesthetic No    Pt has a pacemaker No    Pt has a defibrillator No     Social Drivers of Health     Food Insecurity: No Food Insecurity (7/7/2024)    Food Insecurity     Food Insecurity: Never true   Transportation Needs: No Transportation Needs (7/7/2024)    Transportation Needs     Lack of Transportation: No     Car Seat: Yes   Stress: No Stress Concern Present (7/7/2024)    Stress     Feeling of Stress : No   Housing Stability: Low Risk  (7/7/2024)    Housing Stability     Housing Instability: No     Crib or Bassinette: Yes   [5]   Current Outpatient Medications   Medication Sig Dispense Refill    dicloxacillin 500 MG Oral Cap Take 1 capsule (500 mg total) by mouth 4 (four) times daily for 10 days. 40 capsule 0    Lecithin 1000 MG Oral Chew Tab Chew 5,000 mg by mouth daily. 90 tablet 0    ibuprofen 600 MG Oral Tab Take 1 tablet (600 mg total) by mouth every 6 (six) hours as needed for Pain. 60 tablet 0    Ferrous Sulfate Dried ER (SLOW RELEASE IRON) 160 (50 Fe) MG Oral Tab CR Take 160 mg by mouth daily. (Patient not taking: Reported on 5/22/2025) 90 tablet 1    LEVOTHYROXINE 50 MCG Oral Tab TAKE 1 TABLET BY MOUTH DAILY ON MONDAY THROUGH FRIDAY, AND 2 TABLETS ON SATURDAY AND SUNDAY 105 tablet 0    ERGOCALCIFEROL 1.25 MG (22993 UT) Oral Cap TAKE 1 CAPSULE BY MOUTH 1 TIME A WEEK 12 capsule 0    MONTELUKAST 10 MG Oral Tab TAKE 1 TABLET(10 MG) BY MOUTH DAILY 90 tablet 0    albuterol 108 (90 Base) MCG/ACT Inhalation Aero Soln Inhale 2 puffs into the lungs every 6 (six) hours as needed for Wheezing or Shortness of Breath. 18 g 0    prenatal vitamin w/DHA 27-0.8-228 MG Oral Cap Take 1 capsule by mouth daily.      cetirizine 10 MG Oral Tab Take 1 tablet (10 mg total) by mouth daily.  (Patient not taking: Reported on 5/22/2025)     [6]   Allergies  Allergen Reactions    Shellfish DIARRHEA, NAUSEA AND VOMITING, PALPITATIONS, DIZZINESS, SHORTNESS OF BREATH and ANXIETY     Oyster sauce    Lactose DIARRHEA    Seasonal ITCHING

## 2025-05-22 NOTE — PATIENT INSTRUCTIONS
10 Key Changes in New Protocol:  No such thing as Plugged ducts: more accurately described as “ductal narrowing,” and are related to alveolar distension and/or mammary dysbiosis. If ductal narrowing and alveolar congestion are worsened by overstimulation of milk production, then inflammatory mastitis can develop, and acute bacterial mastitis may follow.  Ice, not heat: Use a combination of ice packs or a cold compress and NSAIDs such as acetaminophen or ibuprofen. Cold reduces the blood flow to cut down on swelling, as opposed to heat, which increases blood flow.  Do not completely empty the affected breast: Breastfeed on unaffected breast first. Avoid extra pumping. Hand expression of small volumes of milk to relieve discomfort.  Minimize use of breast pumps: pumps do not allow bacterial exchange between baby's mouth and breast as so may contribute to dysbiosis. Pumps can also cause trauma to breast tissue and nipple-areola complex.  Avoid nipple shields: promote suboptimal milk extraction, and infants tend to passively draw milk from the nipple shield reservoir without latching onto the breast parenchyma. Feed on Demand: Oversupply is now considered to be the first marker on the mastitis spectrum. An overproduction of breast milk, known as 'hyperlactation', can lead to inflammation, causing a narrowing of the milk ducts, (previously called a 'plugged duct'), and could progress to inflammatory mastitis. According to the protocol, the best thing to do for oversupply is to continue to feed your baby only on demand or to pump on your normal schedule.  Stop forms of clog removal: Because plugged ducts are now understood to be the result of inflammation rather than a blockage that stops up milk supply, there's no need to ask your partner to suck out a clog or 'dangle feeding'.)  “No evidence exists to support 'dangle feeding' (i.e., feeding an infant on the floor with the mother hovering above) or other unsafe infant  positions. Patients may consider safe variations on standard feeding positions, with the understanding that this may improve comfort. However, this does not address underlying inflammation.”  Avoid deep massage: increases inflammation and edema and causes microvascular damage. Therefore, it recommends avoiding any massage devices. Aim for a gentle, lymphatic draining massage, which involves “a light sweeping of the skin rather than deep tissue massage.” You'll want to sweep up from the nipple toward the armpit to help drain any extra fluid and quell inflammation.  Consider taking probiotics for mastitis prevention. “A systematic review suggested that probiotics may be effective for both treatment and prevention of mastitis, but a strong recommendation could not be made due to limitations of the studied trials.” Specific strains that have clinical research supporting their use with mastitis include: Limosilactobacillus fermentum (formerly classified as Lactobacillus fermentum) or, preferably, Ligilactobacillus salivarius (formerly classified as Lactobacillus salivarius) strains.  Antibiotics not always necessary: Antibiotics should be reserved for bacterial mastitis, but not inflammatory mastitis, the authors note. “Use of antibiotics for inflammatory mastitis disrupts the breast microbiome and increases the risk of progression to bacterial mastitis.” Antibiotics also shouldn't be used for mastitis prevention. Many antibiotics also boast anti-inflammatory properties, “and this may explain why women experience relief when taking these,” state the protocol authors.  Sunflower or soy lecithin 5-10 g daily by mouth may be taken to reduce inflammation in ducts and emulsify milk

## 2025-06-12 NOTE — TELEPHONE ENCOUNTER
Please review; protocol failed/ has no protocol      Please see patients MyChart Message      Christy Castrog 4 Clinical Staff (supporting David Patel MD)Yesterday (11:45 AM)       Refills have been requested for the following medications:         ERGOCALCIFEROL 1.25 MG (05930 UT) Oral Cap [[place]]      Patient Comment: This prescription was sent to a pharmacy in [place], which I just cancelled. I need it sent to my selected pharmacy in [place], [place]

## 2025-06-13 RX ORDER — ERGOCALCIFEROL 1.25 MG/1
50000 CAPSULE, LIQUID FILLED ORAL WEEKLY
Qty: 12 CAPSULE | Refills: 0 | Status: SHIPPED | OUTPATIENT
Start: 2025-06-13

## 2025-07-07 ENCOUNTER — TELEMEDICINE (OUTPATIENT)
Dept: OBGYN CLINIC | Facility: CLINIC | Age: 30
End: 2025-07-07

## 2025-07-07 ENCOUNTER — TELEPHONE (OUTPATIENT)
Dept: OBGYN CLINIC | Facility: CLINIC | Age: 30
End: 2025-07-07

## 2025-07-07 DIAGNOSIS — O91.23 MASTITIS ASSOCIATED WITH LACTATION (HCC): Primary | ICD-10-CM

## 2025-07-07 RX ORDER — CEPHALEXIN 500 MG/1
500 CAPSULE ORAL 4 TIMES DAILY
Qty: 28 CAPSULE | Refills: 0 | Status: SHIPPED | OUTPATIENT
Start: 2025-07-07 | End: 2025-07-14

## 2025-07-07 RX ORDER — MONTELUKAST SODIUM 10 MG/1
10 TABLET ORAL DAILY
Qty: 90 TABLET | Refills: 0 | Status: SHIPPED | OUTPATIENT
Start: 2025-07-07

## 2025-07-07 RX ORDER — LEVOTHYROXINE SODIUM 50 UG/1
TABLET ORAL
Qty: 105 TABLET | Refills: 0 | Status: SHIPPED | OUTPATIENT
Start: 2025-07-07

## 2025-07-07 NOTE — TELEPHONE ENCOUNTER
Patient is calling has breast infection started Friday   patient said Sunday started fever . Asking for medication ,  patient said can't drive to office

## 2025-07-07 NOTE — TELEPHONE ENCOUNTER
Pt verified name and .     Pt c/o masitis symptoms in right breast and fever that started . Pt states right breast is painful with swelling, warmth, and redness. Pt states they started taking ibuprofen and lecithin on  with start of fever to help keep decrease fever. Pt not able to come into the office today. Advised that pt be seen for video visit. Pt verbalized understanding and agreed. Pt scheduled for video visit today, , at 11:45 am. Pt aware of scheduling details.

## 2025-07-07 NOTE — PROGRESS NOTES
Subjective:   Patient ID: Christy Cespedes is a 30 year old female.    Christy presents for virtual with concern for mastitis. She reports that on Friday her toddler elbowed her in the breast. She started having tenderness after that time. She reports right sided breast tenderness, hardness, and warmth in upper outer quadrant. Yesterday she had a temp of 100 and started taking ibuprofen. Overall just not feeling well.         History/Other:   Review of Systems   All other systems reviewed and are negative.    Current Medications[1]  Allergies:Allergies[2]    Objective:   Physical Exam  Constitutional:       General: She is not in acute distress.     Appearance: Normal appearance. She is not ill-appearing, toxic-appearing or diaphoretic.   Pulmonary:      Effort: Pulmonary effort is normal.   Chest:       Neurological:      Mental Status: She is alert and oriented to person, place, and time.   Psychiatric:         Mood and Affect: Mood normal.         Behavior: Behavior normal.         Thought Content: Thought content normal.         Judgment: Judgment normal.         Assessment & Plan:   1. Mastitis associated with lactation (HCC)        No orders of the defined types were placed in this encounter.      Meds This Visit:  Requested Prescriptions     Signed Prescriptions Disp Refills    cephALEXin 500 MG Oral Cap 28 capsule 0     Sig: Take 1 capsule (500 mg total) by mouth 4 (four) times daily for 7 days.       Imaging & Referrals:  None    Keflex sent to pharmacy.  Recommend ibuprofen, ice, gentle massage, breastfeeding on demand, rest and hydration. Call if symptoms not improving over next 24-48 hours.     This visit is conducted using Telemedicine with live, interactive video and audio.    Patient has been referred to Critical access hospital website at [email or web address] to review the yearly Consent to Treat document.    Patient understands and accepts financial responsibility for any deductible, co-insurance, and/or  co-pays associated with this service.        [1]   Current Outpatient Medications   Medication Sig Dispense Refill    cephALEXin 500 MG Oral Cap Take 1 capsule (500 mg total) by mouth 4 (four) times daily for 7 days. 28 capsule 0    Ferrous Sulfate Dried ER (SLOW RELEASE IRON) 160 (50 Fe) MG Oral Tab CR Take 160 mg by mouth daily. (Patient not taking: Reported on 5/22/2025) 90 tablet 1    montelukast 10 MG Oral Tab Take 1 tablet (10 mg total) by mouth daily. 90 tablet 0    levothyroxine 50 MCG Oral Tab TAKE 1 TABLET BY MOUTH DAILY ON MONDAY THROUGH FRIDAY, AND 2 TABLETS ON SATURDAY AND SUNDAY. 105 tablet 0    ergocalciferol 1.25 MG (02247 UT) Oral Cap Take 1 capsule (50,000 Units total) by mouth once a week. 12 capsule 0    Lecithin 1000 MG Oral Chew Tab Chew 5,000 mg by mouth daily. 90 tablet 0    ibuprofen 600 MG Oral Tab Take 1 tablet (600 mg total) by mouth every 6 (six) hours as needed for Pain. 60 tablet 0    cetirizine 10 MG Oral Tab Take 1 tablet (10 mg total) by mouth daily. (Patient not taking: Reported on 5/22/2025)      albuterol 108 (90 Base) MCG/ACT Inhalation Aero Soln Inhale 2 puffs into the lungs every 6 (six) hours as needed for Wheezing or Shortness of Breath. 18 g 0    prenatal vitamin w/DHA 27-0.8-228 MG Oral Cap Take 1 capsule by mouth daily.     [2]   Allergies  Allergen Reactions    Shellfish DIARRHEA, NAUSEA AND VOMITING, PALPITATIONS, DIZZINESS, SHORTNESS OF BREATH and ANXIETY     Oyster sauce    Lactose DIARRHEA    Seasonal ITCHING

## 2025-07-08 ENCOUNTER — OFFICE VISIT (OUTPATIENT)
Age: 30
End: 2025-07-08
Attending: GENETIC COUNSELOR, MS
Payer: COMMERCIAL

## 2025-07-08 ENCOUNTER — NURSE ONLY (OUTPATIENT)
Age: 30
End: 2025-07-08
Attending: GENETIC COUNSELOR, MS
Payer: COMMERCIAL

## 2025-07-08 DIAGNOSIS — Z80.3 FAMILY HISTORY OF MALIGNANT NEOPLASM OF BREAST: ICD-10-CM

## 2025-07-08 DIAGNOSIS — Z80.41 FAMILY HISTORY OF MALIGNANT NEOPLASM OF OVARY: Primary | ICD-10-CM

## 2025-07-08 NOTE — PROGRESS NOTES
Reason for visit: Mrs. Cespedes is a 30-year-old woman referred for genetic counseling due to a family history of early-onset breast cancer and ovarian cancer.  She was seen for genetic counseling and to discuss the option of genetic testing.  Mrs. Cespedes is  and is not currently employed outside of the home. She and her  have three young children together.  She was seen in consultation with her , Mono, marco antonio.  Referring MD: David Patel MD  Relevant family history: Mrs. Cespedes shares that her mother was diagnosed with ovarian cancer at age 42.  Her mother is alive and well at age 56 and has recently undergone updated genetic testing for 48 genes and RNA through [facility] with negative results. The only other family member on her maternal side with a malignancy was her maternal grandfather, who was diagnosed with prostate cancer at age 62.  On her paternal side, she shares that her paternal grandmother was diagnosed with breast cancer in her 50's and passed away shortly following her diagnosis of metastatic disease. Her paternal aunt was diagnosed with breast cancer in her 40's and her paternal grandfather was diagnosed with skin cancers (on his head) later in life. She is otherwise unaware of malignancies on her paternal side of her family.  She is not aware of any other family members having pursued genetic testing to date.  She is of [place] heritage on both sides of her family and denies any Ashkenazi Spiritism heritage but does share that her parents are third cousins.  All three of her children (two daughters and a son) are healthy and well by her report.  Medical history: Mrs. Cespedes has not yet started her breast imaging.  She denies any current breast complaints. She denies any breast biopsies to date.  She was 12 at menarche and was 25 at the birth of her eldest child.  She has her ovaries and her uterus intact.  She is pre-menopausal.  She has not yet had a colonoscopy. She denies any  current dermatological concerns or uterine fibroids but does take medication for hypothyroidism.  She denies any use of hormone replacement therapy but does have a history of oral contraceptive use.  She denies any tobacco use and admits to consumption of less than one alcoholic beverage weekly.  She considers herself to be in good health.  Summary:   We discussed hereditary breast cancer with  and Mrs. Cespedes because of her family history.  Most breast cancer is not hereditary; however, hereditary cancer is suspected under certain conditions, such as when breast cancer occurs prior to the age of 50 (or premenopausal), when there are multiple affected family members, when breast cancer occurs in combination with other cancers, especially ovarian cancer, and when cancer appears to be passing from generation to generation.    We discussed dominant inheritance, the pattern in which most hereditary cancer conditions are inherited.  If an individual has such a cancer predisposing gene mutation, there is a 50% chance that any offspring will not inherit the mutation and a 50% chance that he or she will inherit it.  Inheriting the mutation does not automatically mean that one will develop cancer, rather that there is an increased chance for developing certain types of cancer.  Cancer predisposing gene mutations can exist in males and females and can be passed on to both male and female offspring.  The pros and cons of cancer predisposing gene mutation testing were reviewed with  and Mrs. Cespedes.  Genetic test results can have significant impact on medical management, planning, screening, surgical decision-making, cancer risk assessment for the patient and relatives, and psychological/emotional well-being.  Mutations in the genes BRCA1 and BRCA2 account for most (but not all) cases of hereditary breast cancer.  Mutations in other genes have also been associated with an increased risk for breast cancer - but mutations  in these other genes are statistically less often seen in hereditary breast cancer.    We briefly discussed the benefits and limitations of BRCA1/2 testing versus multi-gene panels that include BRCA1/2.  Panels are an appropriate option for individuals whose history is suggestive of more than one syndrome, and they improve detection rate for identifying the underlying cause of a hereditary cancer.  Limitations of panels include an unknown percentage of variants of unknown significance, as well as an uncertainty of level of risk associated with certain unknown-penetrance genes, and therefore lack of clear guidelines for risk management of carriers of some of these mutations.  There are panels that assess for mutations in only “high risk” and clinically actionable breast cancer genes as well as larger panels that assess for mutations in high, moderate and unknown-penetrance breast cancer genes.  Genetic testing could yield one of three results: no mutation detected, deleterious mutation detected, or variant of uncertain significance.  The implications of these potential results were reviewed with  and Mrs. Cespedes.  The optimal testing strategy is to test an affected family member first.  We discussed the limitations of interpreting test results of an unaffected individual.  Specifically, a negative result in an unaffected individual is uninformative unless a known mutation has been identified in an affected relative.  Although her mother was tested with negative results, her paternal history remains unexplained and these relatives have not been tested to date.  Another option is testing Mrs. Cespedes rather than an affected relative, and we reviewed the limitations of this testing, including concerns about discrimination by life insurers, long term healthcare or disability, which are not covered by statutes yet.   Given her family history of early-onset breast cancer on her paternal side, she does meet NCCN  guidelines for genetic testing for HBOC genes.    After discussing the multiple testing options, Mrs. Cespedes decided that she would like to pursue a multi-gene panel focused on breast cancers ([facility] Hereditary Breast Cancers STAT Panel, 9 genes).  The blood was drawn today and sent to [facility].  Turn-around-time is approximately two weeks for the testing.  She is aware that she can reflex to other genes if she is interested.  Our office will call Mrs. Cespedes as soon as results are received; post-test counseling can be scheduled at that time.  Plan:  Ordered [facility] Hereditary Breast Cancers STAT Panel (9 genes) through [facility] (formerly [employer]).  The Genetics office will call Mrs. Cespedes when results are received.  Post-test counseling can be scheduled at that time.  Recommendations for Mrs. Cespedes and family members will depend on above test results.  Thank you for referring Mrs. Cespedes for genetic counseling; please do not hesitate to contact our office if you have any questions or concerns, [phone].  Send to: David Patel MD  Time spent managing patient care: 50 minutes

## 2025-07-20 ENCOUNTER — TELEPHONE (OUTPATIENT)
Age: 30
End: 2025-07-20

## 2025-07-20 NOTE — TELEPHONE ENCOUNTER
Shared NEGATIVE genetic testing results with Shima over phone. She opted for 70 gene panel+RNA through [facility] ([facility] Multi-Cancer Panel+RNA). She was pleased to hear these results. Released them to her through lab's portal and will mail her a copy. All her questions were answered to the best of my ability and she was appreciative of the call.

## (undated) DIAGNOSIS — Z20.822 ENCOUNTER FOR SCREENING LABORATORY TESTING FOR COVID-19 VIRUS: Primary | ICD-10-CM

## (undated) DIAGNOSIS — Z34.82 ENCOUNTER FOR SUPERVISION OF OTHER NORMAL PREGNANCY IN SECOND TRIMESTER: Primary | ICD-10-CM

## (undated) DIAGNOSIS — R79.89 ABNORMAL CBC: Primary | ICD-10-CM

## (undated) NOTE — LETTER
[facility]  Administration of Anesthesia  IChristy agree to be cared for by a physician anesthesiologist alone and/or with a nurse anesthetist, who is specially trained to monitor me and give me medicine to put me to sleep or keep me comfortable during my procedure    I understand that my anesthesiologist and/or anesthetist is not an employee or agent of [facility] or [email or web address]. He or she works for [facility]    As the patient asking for anesthesia services, I agree to:  Allow the anesthesiologist (anesthesia doctor) to give me medicine and do additional procedures as necessary. Some examples are: Starting or using an “IV” to give me medicine, fluids or blood during my procedure, and having a breathing tube placed to help me breathe when I’m asleep (intubation). In the event that my heart stops working properly, I understand that my anesthesiologist will make every effort to sustain my life, unless otherwise directed by [facility] Do Not Resuscitate documents.  Tell my anesthesia doctor before my procedure:  If I am pregnant.  The last time that I ate or drank.  iii. All of the medicines I take (including prescriptions, herbal supplements, and pills I can buy without a prescription (including street drugs/illegal medications). Failure to inform my anesthesiologist about these medicines may increase my risk of anesthetic complications.  iv.If I am allergic to anything or have had a reaction to anesthesia before.  I understand how the anesthesia medicine will help me (benefits).  I understand that with any type of anesthesia medicine there are risks:  The most common risks are: nausea, vomiting, sore throat, muscle soreness, damage to my eyes, mouth, or teeth (from breathing tube placement).  Rare risks include: remembering what happened during my procedure, allergic reactions to medications, injury to my airway, heart, lungs, vision, nerves, or  muscles and in extremely rare instances death.  My doctor has explained to me other choices available to me for my care (alternatives).  Pregnant Patients (“epidural”):  I understand that the risks of having an epidural (medicine given into my back to help control pain during labor), include itching, low blood pressure, difficulty urinating, headache or slowing of the baby’s heart. Very rare risks include infection, bleeding, seizure, irregular heart rhythms and nerve injury.  Regional Anesthesia (“spinal”, “epidural”, & “nerve blocks”):  I understand that rare but potential complications include headache, bleeding, infection, seizure, irregular heart rhythms, and nerve injury.    _____________________________________________________________________________  Patient (or Representative) Signature/Relationship to Patient  Date   Time    _____________________________________________________________________________   Name (if used)    Language/Organization   Time    _____________________________________________________________________________  Nurse Anesthetist Signature     Date   Time  _____________________________________________________________________________  Anesthesiologist Signature     Date   Time  I have discussed the procedure and information above with the patient (or patient’s representative) and answered their questions. The patient or their representative has agreed to have anesthesia services.    _____________________________________________________________________________  Witness        Date   Time  I have verified that the signature is that of the patient or patient’s representative, and that it was signed before the procedure  Patient Name: Christy Cespedes     : 3/10/1995                 Printed: 2024 at 8:52 AM    Medical Record #: [id number]                                            Page 1 of 1  ----------ANESTHESIA CONSENT----------

## (undated) NOTE — MR AVS SNAPSHOT
[facility] SPECIALTY [facility] - [place] [address] [address] [address] [phone]               Thank you for choosing us for your health care visit with Onnie Eisenmenger, MD.  We are glad to serve you and happy to provide you with this summary of yo Instructions and Information about Your Health     None      Allergies as of May 17, 2017     Lactose Diarrhea                Today's Vital Signs     BP Pulse Temp Weight          112/74 mmHg 71 97.9 °F (36.6 °C) (Oral) 131 lb (59.421 kg)           Current

## (undated) NOTE — MR AVS SNAPSHOT
[facility] SPECIALTY [facility] - [place] [address] [address] [id number] [phone]               Thank you for choosing us for your health care visit with Nurse. We are glad to serve you and happy to provide you with this summary of your visit.

## (undated) NOTE — LETTER
Dear New MomTamar, we missed you! The nurses of [facility] have tried to reach you by phone to ask if you have any questions regarding your health or the health and care of your new little one.    We hope you are doing well. If, for any reason, you have questions or concerns about your health or your baby’s health, please contact your provider or your pediatrician or family medicine physician regarding your baby.     At [facility], we feel that postpartum support is very important for new families. Please see the enclosed new parent support flyer that lists support programs and resources with both in-person and online options.     Additionally, our Breastfeeding Centers at [facility] and [facility] in [place], offer outpatient visits with our International Board-Certified Lactation   Consultants (IBCLCs) for any breastfeeding concerns or questions you may have.    For issues related to stress, anxiety or depression, we have a Nurturing Mom support group that meets both in-person or online.  There’s also a 24-hour Mom’s Line where you can request a phone call from a clinical therapist for assistance for postpartum depression.    We encourage you to take advantage of these programs and resources as you recover from childbirth and learn to care for your new infant.    Best wishes,    [facility]            [id number]

## (undated) NOTE — LETTER
VACCINE ADMINISTRATION RECORD  PARENT / GUARDIAN APPROVAL  Date: 2024  Vaccine administered to: Christy Cespedes     : 3/10/1995    MRN: [id number]    A copy of the appropriate Centers for Disease Control and Prevention Vaccine Information statement has been provided. I have read or have had explained the information about the diseases and the vaccines listed below. There was an opportunity to ask questions and any questions were answered satisfactorily. I believe that I understand the benefits and risks of the vaccine cited and ask that the vaccine(s) listed below be given to me or to the person named above (for whom I am authorized to make this request).    VACCINES ADMINISTERED:  Tdap    I have read and hereby agree to be bound by the terms of this agreement as stated above. My signature is valid until revoked by me in writing.  This document is signed by Christy Cespedes, relationship: Self on 2024.:                                                                                                                                         Parent / Guardian Signature                                                Date

## (undated) NOTE — MR AVS SNAPSHOT
[facility] SPECIALTY [facility] - [place] [address] [address] [id number] [phone]               Thank you for choosing us for your health care visit with Ayan Rodriguez MD.  We are glad to serve you and happy to provide you with this summary of yo Commonly known as:  SYNTHROID, LEVOTHROID           Norethindrone Acet-Ethinyl Est 1-20 MG-MCG Tabs   Take 1 tablet by mouth daily.    Commonly known as:  Manuel Ames 1/20                Where to Get Your Medications      These medications were sent to [facility]